# Patient Record
Sex: FEMALE | Race: WHITE | Employment: FULL TIME | URBAN - METROPOLITAN AREA
[De-identification: names, ages, dates, MRNs, and addresses within clinical notes are randomized per-mention and may not be internally consistent; named-entity substitution may affect disease eponyms.]

---

## 2017-03-02 ENCOUNTER — TELEPHONE (OUTPATIENT)
Dept: FAMILY MEDICINE | Facility: CLINIC | Age: 67
End: 2017-03-02

## 2017-03-02 ENCOUNTER — OFFICE VISIT (OUTPATIENT)
Dept: FAMILY MEDICINE | Facility: CLINIC | Age: 67
End: 2017-03-02
Payer: COMMERCIAL

## 2017-03-02 VITALS
SYSTOLIC BLOOD PRESSURE: 98 MMHG | WEIGHT: 139 LBS | HEART RATE: 75 BPM | OXYGEN SATURATION: 100 % | DIASTOLIC BLOOD PRESSURE: 53 MMHG | TEMPERATURE: 97.1 F | HEIGHT: 64 IN | BODY MASS INDEX: 23.73 KG/M2

## 2017-03-02 DIAGNOSIS — R09.81 SINUS CONGESTION: Primary | ICD-10-CM

## 2017-03-02 DIAGNOSIS — N60.19 FIBROCYSTIC BREAST DISEASE, UNSPECIFIED LATERALITY: ICD-10-CM

## 2017-03-02 PROCEDURE — 99213 OFFICE O/P EST LOW 20 MIN: CPT | Performed by: NURSE PRACTITIONER

## 2017-03-02 RX ORDER — PREDNISONE 20 MG/1
40 TABLET ORAL DAILY
Qty: 10 TABLET | Refills: 0 | Status: SHIPPED | OUTPATIENT
Start: 2017-03-02 | End: 2017-03-07

## 2017-03-02 RX ORDER — ESTRADIOL 0.04 MG/D
1 PATCH, EXTENDED RELEASE TRANSDERMAL
Qty: 24 PATCH | Refills: 3 | Status: SHIPPED | OUTPATIENT
Start: 2017-03-02 | End: 2017-11-21

## 2017-03-02 NOTE — PROGRESS NOTES
HPI      SUBJECTIVE:                                                    Renea Lerma is a 66 year old female who presents to clinic today for the following health issues:      RESPIRATORY SYMPTOMS      Duration: Off and on since January, got worse yesterday    Description  nasal congestion, facial pain/pressure, cough, chills, ear pain bilateral, headache, hoarse voice, nausea and stomach ache    Severity: moderate    Accompanying signs and symptoms: None    History (predisposing factors):  none    Precipitating or alleviating factors: None    Therapies tried and outcome:  none       Dec 31 had initial cold for about 10 days   Soon after got sick again   Has sinus pressure, ear pressure, hoarse voice  Blowing nose a lot and its occasionally bloody   Post nasal drainage causing cough   Hasn't taken anything recently OTC   Hasn't had luck with abx in the past, but does well with prednisone       Past Medical History   Diagnosis Date     Asthma      BCC (basal cell carcinoma of skin) 2010     rt shoulder     Endometrial ca (H)      stage 1     Family hx of colon cancer      GERD (gastroesophageal reflux disease)      Hormone replacement therapy      Hyperlipidemia LDL goal <130 11/4/2016     Hypothyroidism      Nonrheumatic aortic valve insufficiency 11/4/2016     PONV (postoperative nausea and vomiting)      Shoulder arthritis      rt     Thoracic aortic aneurysm without rupture (H)      4.3 cm     Vitamin D deficiency 11/4/2016     Past Surgical History   Procedure Laterality Date     C vag hyst,uterus >250 gms,rem tube/ovary  2000     Colonoscopy       Tonsillectomy       Colonoscopy N/A 12/12/2016     Procedure: COLONOSCOPY;  Surgeon: Manjinder Vera MD;  Location:  GI     Social History   Substance Use Topics     Smoking status: Never Smoker     Smokeless tobacco: Never Used     Alcohol use Yes      Comment: 1 glass wine per week     Current Outpatient Prescriptions   Medication Sig Dispense Refill      "estradiol (VIVELLE-DOT) 0.0375 MG/24HR Place 1 patch onto the skin twice a week 8 patch 3     levothyroxine (SYNTHROID, LEVOTHROID) 75 MCG tablet Take 1 tablet (75 mcg) by mouth daily 90 tablet 3     albuterol (PROAIR HFA, PROVENTIL HFA, VENTOLIN HFA) 108 (90 BASE) MCG/ACT inhaler Inhale 2 puffs into the lungs every 6 hours as needed for shortness of breath / dyspnea or wheezing 3 Inhaler 1     Allergies   Allergen Reactions     No Known Drug Allergies      Seasonal Allergies      Adhesive Tape Rash       Reviewed PMH, med list and allergies.      ROS  Detailed as above       BP 98/53 (BP Location: Right arm, Patient Position: Chair, Cuff Size: Adult Regular)  Pulse 75  Temp 97.1  F (36.2  C) (Oral)  Ht 5' 3.5\" (1.613 m)  Wt 139 lb (63 kg)  SpO2 100%  BMI 24.24 kg/m2      Physical Exam   Constitutional: She is well-developed, well-nourished, and in no distress.   HENT:   Head: Normocephalic.   Right Ear: Tympanic membrane, external ear and ear canal normal.   Left Ear: Tympanic membrane, external ear and ear canal normal.   Nose: Rhinorrhea present. No mucosal edema. Right sinus exhibits no maxillary sinus tenderness and no frontal sinus tenderness. Left sinus exhibits no maxillary sinus tenderness and no frontal sinus tenderness.   Mouth/Throat: Oropharynx is clear and moist. No oropharyngeal exudate.   Eyes: Conjunctivae are normal.   Neck: Normal range of motion.   Cardiovascular: Normal rate, regular rhythm and normal heart sounds.    No murmur heard.  Pulmonary/Chest: Effort normal and breath sounds normal. No respiratory distress.   Lymphadenopathy:     She has no cervical adenopathy.   Neurological: She is alert.   Skin: Skin is warm and dry.   Psychiatric: Mood and affect normal.   Vitals reviewed.      Assessment and Plan:       ICD-10-CM    1. Sinus congestion R09.81 predniSONE (DELTASONE) 20 MG tablet   2. Fibrocystic breast disease, unspecified laterality N60.19 estradiol (VIVELLE-DOT) 0.0375 MG/24HR BIW " patch       Only has had improvement in the past with prednisone so will trial this   Consider ENT f/u if symptoms persist or return again soon       DANIEL Kaba, CNP  [facility]

## 2017-03-02 NOTE — PATIENT INSTRUCTIONS
Use the ocean spray 4 times a day during this acute phase   Take the prednisone. You can stop it the day you feel better. You do not need to take the entire course if you are better.

## 2017-03-02 NOTE — TELEPHONE ENCOUNTER
Patient called and states was sick on New years zoe on flight back and was sick for a week then better and then sick and has been going on ever since then.  States yesterday has intensified with pain in her head, choking from drainage in her throat and nausea from     Acute Illness   Acute illness concerns: sinus pressure  Onset: started bad yesterday    Fever: no feels warm    Chills/Sweats: YES    Headache (location?): YES    Sinus Pressure:YES    Conjunctivitis:  no    Ear Pain: YES- both    Rhinorrhea: YES- yellow    Congestion: YES    Sore Throat: YES     Cough: YES-non-productive states feels from nasal drainage irritation    Wheeze: no     Decreased Appetite: no     Nausea: YES- states from drainage into stomach    Vomiting: no    Diarrhea:  no    Dysuria/Freq.: no    Fatigue/Achiness: no    Sick/Strep Exposure: no     Therapies Tried and outcome: States previously doing cough drops, mucinex, generic benadryl,  vicks    Advised patient to be seen. OV scheduled with Tom for evaluation and treatment.  Tuyet Nath RN  [facility] [employer]

## 2017-03-02 NOTE — NURSING NOTE
"Chief Complaint   Patient presents with     URI       Initial BP 98/53 (BP Location: Right arm, Patient Position: Chair, Cuff Size: Adult Regular)  Pulse 75  Temp 97.1  F (36.2  C) (Oral)  Ht 5' 3.5\" (1.613 m)  Wt 139 lb (63 kg)  SpO2 100%  BMI 24.24 kg/m2 Estimated body mass index is 24.24 kg/(m^2) as calculated from the following:    Height as of this encounter: 5' 3.5\" (1.613 m).    Weight as of this encounter: 139 lb (63 kg).  Medication Reconciliation: complete.    Susanna Montenegro CMA      "

## 2017-03-02 NOTE — MR AVS SNAPSHOT
After Visit Summary   3/2/2017    Renea Lerma    MRN: [id number]           Patient Information     Date Of Birth          1950        Visit Information        Provider Department      3/2/2017 11:30 AM Sasha Mejia APRN CNP [facility]        Today's Diagnoses     Sinus congestion    -  1    Fibrocystic breast disease, unspecified laterality          Care Instructions    Use the ocean spray 4 times a day during this acute phase   Take the prednisone. You can stop it the day you feel better. You do not need to take the entire course if you are better.         Follow-ups after your visit        Who to contact     If you have questions or need follow up information about today's clinic visit or your schedule please contact [facility] directly at [phone].  Normal or non-critical lab and imaging results will be communicated to you by MyChart, letter or phone within 4 business days after the clinic has received the results. If you do not hear from us within 7 days, please contact the clinic through MyChart or phone. If you have a critical or abnormal lab result, we will notify you by phone as soon as possible.  Submit refill requests through Storie or call your pharmacy and they will forward the refill request to us. Please allow 3 business days for your refill to be completed.          Additional Information About Your Visit        MyChart Information     Storie gives you secure access to your electronic health record. If you see a primary care provider, you can also send messages to your care team and make appointments. If you have questions, please call your primary care clinic.  If you do not have a primary care provider, please call [phone] and they will assist you.        Care EveryWhere ID     This is your Care EveryWhere ID. This could be used by other organizations to access your [facility] medical records  [id number]        Your Vitals Were      "Pulse Temperature Height Pulse Oximetry BMI (Body Mass Index)       75 97.1  F (36.2  C) (Oral) 5' 3.5\" (1.613 m) 100% 24.24 kg/m2        Blood Pressure from Last 3 Encounters:   03/02/17 98/53   12/12/16 114/60   11/04/16 102/57    Weight from Last 3 Encounters:   03/02/17 139 lb (63 kg)   12/12/16 135 lb (61.2 kg)   11/04/16 139 lb 14.4 oz (63.5 kg)              Today, you had the following     No orders found for display         Today's Medication Changes          These changes are accurate as of: 3/2/17  8:02 PM.  If you have any questions, ask your nurse or doctor.               Start taking these medicines.        Dose/Directions    predniSONE 20 MG tablet   Commonly known as:  DELTASONE   Used for:  Sinus congestion   Started by:  Sasha Mejia APRN CNP        Dose:  40 mg   Take 2 tablets (40 mg) by mouth daily for 5 days   Quantity:  10 tablet   Refills:  0            Where to get your medicines      These medications were sent to [facility]/pharmacy #[id number] - [place], [place] - [address]  [address] [address]     Phone:  [phone]     estradiol 0.0375 MG/24HR BIW patch    predniSONE 20 MG tablet                Primary Care Provider Office Phone # Fax #    Caprice Lares [phone]       [facility] [address] [facility] [address]  [facility] [address]        Thank you!     Thank you for choosing [facility]  for your care. Our goal is always to provide you with excellent care. Hearing back from our patients is one way we can continue to improve our services. Please take a few minutes to complete the written survey that you may receive in the mail after your visit with us. Thank you!             Your Updated Medication List - Protect others around you: Learn how to safely use, store and throw away your medicines at www.disposemymeds.org.          This list is accurate as of: 3/2/17  8:02 PM.  Always use your most recent med list.                   Brand Name " Dispense Instructions for use    albuterol 108 (90 BASE) MCG/ACT Inhaler    PROAIR HFA/PROVENTIL HFA/VENTOLIN HFA    3 Inhaler    Inhale 2 puffs into the lungs every 6 hours as needed for shortness of breath / dyspnea or wheezing       estradiol 0.0375 MG/24HR BIW patch    VIVELLE-DOT    24 patch    Place 1 patch onto the skin twice a week       levothyroxine 75 MCG tablet    SYNTHROID/LEVOTHROID    90 tablet    Take 1 tablet (75 mcg) by mouth daily       predniSONE 20 MG tablet    DELTASONE    10 tablet    Take 2 tablets (40 mg) by mouth daily for 5 days

## 2017-03-03 ASSESSMENT — ASTHMA QUESTIONNAIRES: ACT_TOTALSCORE: 24

## 2017-03-10 NOTE — TELEPHONE ENCOUNTER
ORder placed. [facility] [place] [phone]   [email or web address]

## 2017-03-10 NOTE — TELEPHONE ENCOUNTER
Patient last seen 3/2/17 Sasha Mejia  Looks like if symptoms were not improving; consider ENT.     Dr. Mejia:  Patient stating symptoms have not improved. Would you like to refer to ENT?    Jamaica Mosley RN      Assessment and Plan 3/2/17:          ICD-10-CM     1. Sinus congestion R09.81 predniSONE (DELTASONE) 20 MG tablet   2. Fibrocystic breast disease, unspecified laterality N60.19 estradiol (VIVELLE-DOT) 0.0375 MG/24HR BIW patch         Only has had improvement in the past with prednisone so will trial this   Consider ENT f/u if symptoms persist or return again soon         Sasha Mejia, APRN, CNP  [facility]

## 2017-03-10 NOTE — TELEPHONE ENCOUNTER
Reason for call:  Patient reporting a symptom    Symptom or request: sinus    Duration (how long have symptoms been present): since january    Have you been treated for this before? Yes    Additional comments: PT was seen on 3/2 and advised to call if symptoms continue    Phone Number patient can be reached at:  Home number on file [phone] (home)    Best Time:      Can we leave a detailed message on this number:  YES    Call taken on 3/10/2017 at 3:31 PM by Reynaldo Rabago

## 2017-03-12 NOTE — TELEPHONE ENCOUNTER
Honestly any of the physicians at \[facility]\"" [facility] are excellent. You can tell her I have worked with all of them, so she will get excellent care from any of them. Thanks

## 2017-03-13 NOTE — TELEPHONE ENCOUNTER
I called patient and spoke with her.   Relayed Dr. Mejia's advice.   Patient will call to schedule with first available then.    Jamaica Mosley RN

## 2017-06-14 ENCOUNTER — RADIANT APPOINTMENT (OUTPATIENT)
Dept: GENERAL RADIOLOGY | Facility: CLINIC | Age: 67
End: 2017-06-14
Attending: PHYSICIAN ASSISTANT
Payer: COMMERCIAL

## 2017-06-14 ENCOUNTER — OFFICE VISIT (OUTPATIENT)
Dept: FAMILY MEDICINE | Facility: CLINIC | Age: 67
End: 2017-06-14
Payer: COMMERCIAL

## 2017-06-14 VITALS
HEART RATE: 76 BPM | SYSTOLIC BLOOD PRESSURE: 102 MMHG | HEIGHT: 64 IN | DIASTOLIC BLOOD PRESSURE: 59 MMHG | TEMPERATURE: 98.2 F | OXYGEN SATURATION: 98 % | BODY MASS INDEX: 24.55 KG/M2 | WEIGHT: 143.8 LBS

## 2017-06-14 DIAGNOSIS — I71.20 THORACIC AORTIC ANEURYSM WITHOUT RUPTURE (H): ICD-10-CM

## 2017-06-14 DIAGNOSIS — R06.09 DOE (DYSPNEA ON EXERTION): ICD-10-CM

## 2017-06-14 DIAGNOSIS — I35.1 NONRHEUMATIC AORTIC VALVE INSUFFICIENCY: ICD-10-CM

## 2017-06-14 DIAGNOSIS — E03.9 HYPOTHYROIDISM, UNSPECIFIED TYPE: ICD-10-CM

## 2017-06-14 DIAGNOSIS — R06.09 DOE (DYSPNEA ON EXERTION): Primary | ICD-10-CM

## 2017-06-14 DIAGNOSIS — R07.89 CHEST TIGHTNESS: ICD-10-CM

## 2017-06-14 DIAGNOSIS — J45.20 MILD INTERMITTENT ASTHMA WITHOUT COMPLICATION: ICD-10-CM

## 2017-06-14 LAB
ERYTHROCYTE [DISTWIDTH] IN BLOOD BY AUTOMATED COUNT: 13.4 % (ref 10–15)
HCT VFR BLD AUTO: 37.4 % (ref 35–47)
HGB BLD-MCNC: 12.7 G/DL (ref 11.7–15.7)
MCH RBC QN AUTO: 30.5 PG (ref 26.5–33)
MCHC RBC AUTO-ENTMCNC: 34 G/DL (ref 31.5–36.5)
MCV RBC AUTO: 90 FL (ref 78–100)
PLATELET # BLD AUTO: 263 10E9/L (ref 150–450)
RBC # BLD AUTO: 4.16 10E12/L (ref 3.8–5.2)
TROPONIN I SERPL-MCNC: NORMAL UG/L (ref 0–0.04)
WBC # BLD AUTO: 6.8 10E9/L (ref 4–11)

## 2017-06-14 PROCEDURE — 85027 COMPLETE CBC AUTOMATED: CPT | Performed by: PHYSICIAN ASSISTANT

## 2017-06-14 PROCEDURE — 84443 ASSAY THYROID STIM HORMONE: CPT | Performed by: PHYSICIAN ASSISTANT

## 2017-06-14 PROCEDURE — 99214 OFFICE O/P EST MOD 30 MIN: CPT | Performed by: PHYSICIAN ASSISTANT

## 2017-06-14 PROCEDURE — 71020 XR CHEST 2 VW: CPT

## 2017-06-14 PROCEDURE — 36415 COLL VENOUS BLD VENIPUNCTURE: CPT | Performed by: PHYSICIAN ASSISTANT

## 2017-06-14 PROCEDURE — 93000 ELECTROCARDIOGRAM COMPLETE: CPT | Performed by: PHYSICIAN ASSISTANT

## 2017-06-14 PROCEDURE — 84484 ASSAY OF TROPONIN QUANT: CPT | Performed by: PHYSICIAN ASSISTANT

## 2017-06-14 NOTE — MR AVS SNAPSHOT
After Visit Summary   6/14/2017    Renea Lerma    MRN: [id number]           Patient Information     Date Of Birth          1950        Visit Information        Provider Department      6/14/2017 2:00 PM Christiane Salazar PA-C [facility]        Today's Diagnoses     DALEY (dyspnea on exertion)    -  1    Chest tightness        Thoracic aortic aneurysm without rupture (H)        Mild intermittent asthma without complication        Nonrheumatic aortic valve insufficiency        Hypothyroidism, unspecified type           Follow-ups after your visit        Additional Services     CARDIO  ADULT REFERRAL       [facility] is referring you to Cardiology Services.       The  Representative will assist you in the coordination of your Cardiology care as prescribed by your physician.    The  Representative will call you within 24 hours to help schedule your appointment, or you may contact the  Representative at: [phone].         Type of Referral: New Cardiology Referral            Timeframe requested: 1-2 days       Coverage of these services is subject to the terms and limitations of your health insurance plan.  Please call member services at your health plan with any benefit or coverage questions.      If X-rays, CT or MRI's have been performed, please contact the facility where they were done to arrange for , prior to your scheduled appointment.  Please bring this referral request to your appointment and present it to your specialist.                  Your next 10 appointments already scheduled     Jun 16, 2017  7:30 AM CDT   Ech Complete with [id number]   [facility] CV Echocardiography (Cardiovascular Imaging at [facility])    [address] [place] [id number] [phone]           1.  Please bring or wear a comfortable two-piece outfit. 2.  You may eat, drink and take your normal  "medicines. 3.  For any questions that cannot be answered, please contact the ordering physician              Future tests that were ordered for you today     Open Future Orders        Priority Expected Expires Ordered    Echocardiogram Complete Routine  6/14/2018 6/14/2017            Who to contact     If you have questions or need follow up information about today's clinic visit or your schedule please contact [facility] directly at [phone].  Normal or non-critical lab and imaging results will be communicated to you by G-modehart, letter or phone within 4 business days after the clinic has received the results. If you do not hear from us within 7 days, please contact the clinic through Cerecort or phone. If you have a critical or abnormal lab result, we will notify you by phone as soon as possible.  Submit refill requests through Klevosti or call your pharmacy and they will forward the refill request to us. Please allow 3 business days for your refill to be completed.          Additional Information About Your Visit        G-modeharVasoNova Information     Klevosti gives you secure access to your electronic health record. If you see a primary care provider, you can also send messages to your care team and make appointments. If you have questions, please call your primary care clinic.  If you do not have a primary care provider, please call [phone] and they will assist you.        Care EveryWhere ID     This is your Care EveryWhere ID. This could be used by other organizations to access your [place] medical records  [id number]        Your Vitals Were     Pulse Temperature Height Pulse Oximetry BMI (Body Mass Index)       76 98.2  F (36.8  C) (Oral) 5' 3.5\" (1.613 m) 98% 25.07 kg/m2        Blood Pressure from Last 3 Encounters:   06/14/17 102/59   03/02/17 98/53   12/12/16 114/60    Weight from Last 3 Encounters:   06/14/17 143 lb 12.8 oz (65.2 kg)   03/02/17 139 lb (63 kg)   12/12/16 135 lb (61.2 kg)    "           We Performed the Following     CARDIO  ADULT REFERRAL     CBC with platelets     EKG 12-lead complete w/read - Clinics     Troponin I     TSH with free T4 reflex        Primary Care Provider Office Phone # Fax #    Caprice Lares [phone]       [facility] [address]  [place] [address]        Thank you!     Thank you for choosing [facility]  for your care. Our goal is always to provide you with excellent care. Hearing back from our patients is one way we can continue to improve our services. Please take a few minutes to complete the written survey that you may receive in the mail after your visit with us. Thank you!             Your Updated Medication List - Protect others around you: Learn how to safely use, store and throw away your medicines at www.disposemymeds.org.          This list is accurate as of: 6/14/17  4:36 PM.  Always use your most recent med list.                   Brand Name Dispense Instructions for use    albuterol 108 (90 BASE) MCG/ACT Inhaler    PROAIR HFA/PROVENTIL HFA/VENTOLIN HFA    3 Inhaler    Inhale 2 puffs into the lungs every 6 hours as needed for shortness of breath / dyspnea or wheezing       estradiol 0.0375 MG/24HR BIW patch    VIVELLE-DOT    24 patch    Place 1 patch onto the skin twice a week       levothyroxine 75 MCG tablet    SYNTHROID/LEVOTHROID    90 tablet    Take 1 tablet (75 mcg) by mouth daily

## 2017-06-14 NOTE — NURSING NOTE
"Chief Complaint   Patient presents with     Eye Problem     blurred vision       Initial /59 (BP Location: Left arm, Patient Position: Chair, Cuff Size: Adult Regular)  Pulse 76  Temp 98.2  F (36.8  C) (Oral)  Ht 5' 3.5\" (1.613 m)  Wt 143 lb 12.8 oz (65.2 kg)  SpO2 98%  BMI 25.07 kg/m2 Estimated body mass index is 25.07 kg/(m^2) as calculated from the following:    Height as of this encounter: 5' 3.5\" (1.613 m).    Weight as of this encounter: 143 lb 12.8 oz (65.2 kg).  Medication Reconciliation: complete   Hope Villalobos MA  "

## 2017-06-14 NOTE — PROGRESS NOTES
"HPI: 68 yo female with PMH of thoracic aneurysm and \"mild MR and AR\" here with complaint of pain in feet with swelling starting over the w/e  This is not typical for her  Denies hx of problems with edema. Wt is up 2lbs compared to recent gyn visit.  Today when driving to work feeling lightheaded and vision feels off so coworker brought her in for this urgent visit.  She feels like both eyes are cloudy and this makes reading difficult which is new  She had her coworker bring her here today  She has nausea today as well  She was able to eat a bagel but still feels nauseated  She has some loose stools x 2d  Yesterday had a small BM every time she urinated  Today had about 4 loose stools today  Denies melana or hematochezia  She has never had swelling in her feet  She feels a tightness in her chest well and feels more sob with exertion lately when biking    Past Medical History:   Diagnosis Date     Asthma      BCC (basal cell carcinoma of skin) 2010    rt shoulder     Endometrial ca (H)     stage 1     Family hx of colon cancer      GERD (gastroesophageal reflux disease)      Hormone replacement therapy      Hyperlipidemia LDL goal <130 11/4/2016     Hypothyroidism      Nonrheumatic aortic valve insufficiency 11/4/2016     PONV (postoperative nausea and vomiting)      Shoulder arthritis     rt     Thoracic aortic aneurysm without rupture (H)     4.3 cm     Vitamin D deficiency 11/4/2016     Past Surgical History:   Procedure Laterality Date     C VAG HYST,UTERUS >250 GMS,REM TUBE/OVARY  2000     COLONOSCOPY       COLONOSCOPY N/A 12/12/2016    Procedure: COLONOSCOPY;  Surgeon: Manjinder Vera MD;  Location:  GI     TONSILLECTOMY       Social History   Substance Use Topics     Smoking status: Never Smoker     Smokeless tobacco: Never Used     Alcohol use Yes      Comment: 1 glass wine per week     Current Outpatient Prescriptions   Medication Sig Dispense Refill     estradiol (VIVELLE-DOT) 0.0375 MG/24HR BIW patch " "Place 1 patch onto the skin twice a week 24 patch 3     levothyroxine (SYNTHROID, LEVOTHROID) 75 MCG tablet Take 1 tablet (75 mcg) by mouth daily 90 tablet 3     albuterol (PROAIR HFA, PROVENTIL HFA, VENTOLIN HFA) 108 (90 BASE) MCG/ACT inhaler Inhale 2 puffs into the lungs every 6 hours as needed for shortness of breath / dyspnea or wheezing 3 Inhaler 1     Allergies   Allergen Reactions     No Known Drug Allergies      Seasonal Allergies      Adhesive Tape Rash     FAMILY HISTORY NOTED AND REVIEWED  PHYSICAL EXAM:    /59 (BP Location: Left arm, Patient Position: Chair, Cuff Size: Adult Regular)  Pulse 76  Temp 98.2  F (36.8  C) (Oral)  Ht 5' 3.5\" (1.613 m)  Wt 143 lb 12.8 oz (65.2 kg)  SpO2 98%  BMI 25.07 kg/m2    Patient appears non toxic  Lungs: CTA bilat without crackles or wheezing  Heart: RRR without skips  Extr; trace nonpitting pedal edema bilat  Psych: approp affect and mood.    Results for orders placed or performed in visit on 06/14/17   Troponin I   Result Value Ref Range    Troponin I ES  0.000 - 0.045 ug/L     <0.015  The 99th percentile for upper reference range is 0.045 ug/L.  Troponin values in   the range of 0.045 - 0.120 ug/L may be associated with risks of adverse   clinical events.     CBC with platelets   Result Value Ref Range    WBC 6.8 4.0 - 11.0 10e9/L    RBC Count 4.16 3.8 - 5.2 10e12/L    Hemoglobin 12.7 11.7 - 15.7 g/dL    Hematocrit 37.4 35.0 - 47.0 %    MCV 90 78 - 100 fl    MCH 30.5 26.5 - 33.0 pg    MCHC 34.0 31.5 - 36.5 g/dL    RDW 13.4 10.0 - 15.0 %    Platelet Count 263 150 - 450 10e9/L         Assessment and Plan:     (R06.09) DALEY (dyspnea on exertion)  (primary encounter diagnosis)  Comment: echocardiogram ordered. Cardiac ref placed  Plan: EKG 12-lead complete w/read - Clinics, XR Chest        2 Views, CBC with platelets            (R07.89) Chest tightness  Comment:   Plan: Troponin I is normal, echo ordered and cardiac ref sent        Pt advised to go to ED if sxs " worsen or persist    (I71.2) Thoracic aortic aneurysm without rupture (H)  Comment:   Plan: echo as above.    (J45.20) Mild intermittent asthma without complication  Comment:   Plan: stable, no changes    (I35.1) Nonrheumatic aortic valve insufficiency  Comment:   Plan: cards/echo     (E03.9) Hypothyroidism, unspecified type  Comment:   Plan: TSH with free T4 reflex              Christiane Salazar PA-C

## 2017-06-15 LAB — TSH SERPL DL<=0.005 MIU/L-ACNC: 0.8 MU/L (ref 0.4–4)

## 2017-06-16 ENCOUNTER — HOSPITAL ENCOUNTER (OUTPATIENT)
Dept: CARDIOLOGY | Facility: CLINIC | Age: 67
Discharge: HOME | End: 2017-06-16
Attending: PHYSICIAN ASSISTANT | Admitting: PHYSICIAN ASSISTANT
Payer: COMMERCIAL

## 2017-06-16 DIAGNOSIS — R06.09 DOE (DYSPNEA ON EXERTION): ICD-10-CM

## 2017-06-16 DIAGNOSIS — R07.89 CHEST TIGHTNESS: ICD-10-CM

## 2017-06-16 DIAGNOSIS — I35.1 NONRHEUMATIC AORTIC VALVE INSUFFICIENCY: ICD-10-CM

## 2017-06-16 PROCEDURE — 93306 TTE W/DOPPLER COMPLETE: CPT

## 2017-06-16 PROCEDURE — 93306 TTE W/DOPPLER COMPLETE: CPT | Mod: 26 | Performed by: INTERNAL MEDICINE

## 2017-07-05 ENCOUNTER — PRE VISIT (OUTPATIENT)
Dept: CARDIOLOGY | Facility: CLINIC | Age: 67
End: 2017-07-05

## 2017-07-05 DIAGNOSIS — R91.1 LUNG NODULE: ICD-10-CM

## 2017-07-21 ENCOUNTER — OFFICE VISIT (OUTPATIENT)
Dept: CARDIOLOGY | Facility: CLINIC | Age: 67
End: 2017-07-21
Attending: PHYSICIAN ASSISTANT
Payer: COMMERCIAL

## 2017-07-21 VITALS
WEIGHT: 142.9 LBS | SYSTOLIC BLOOD PRESSURE: 112 MMHG | DIASTOLIC BLOOD PRESSURE: 65 MMHG | BODY MASS INDEX: 24.4 KG/M2 | HEIGHT: 64 IN | HEART RATE: 70 BPM

## 2017-07-21 DIAGNOSIS — E78.2 MIXED HYPERLIPIDEMIA: ICD-10-CM

## 2017-07-21 DIAGNOSIS — I71.20 THORACIC AORTIC ANEURYSM WITHOUT RUPTURE (H): ICD-10-CM

## 2017-07-21 DIAGNOSIS — I35.1 NONRHEUMATIC AORTIC VALVE INSUFFICIENCY: Primary | ICD-10-CM

## 2017-07-21 DIAGNOSIS — R06.09 DOE (DYSPNEA ON EXERTION): ICD-10-CM

## 2017-07-21 PROCEDURE — 99204 OFFICE O/P NEW MOD 45 MIN: CPT | Performed by: INTERNAL MEDICINE

## 2017-07-21 RX ORDER — FLUTICASONE PROPIONATE 50 MCG
1 SPRAY, SUSPENSION (ML) NASAL DAILY PRN
COMMUNITY

## 2017-07-21 RX ORDER — MULTIPLE VITAMINS W/ MINERALS TAB 9MG-400MCG
1 TAB ORAL DAILY
COMMUNITY
End: 2017-11-06

## 2017-07-21 NOTE — MR AVS SNAPSHOT
After Visit Summary   7/21/2017    Renea Lerma    MRN: [id number]           Patient Information     Date Of Birth          1950        Visit Information        Provider Department      7/21/2017 4:15 PM Michael Bustamante MD [facility]        Today's Diagnoses     Nonrheumatic aortic valve insufficiency    -  1    Thoracic aortic aneurysm without rupture (H)        Mixed hyperlipidemia        DALEY (dyspnea on exertion)           Follow-ups after your visit        Additional Services     Follow-Up with Cardiologist                 Future tests that were ordered for you today     Open Future Orders        Priority Expected Expires Ordered    Basic metabolic panel Routine 1/17/2018 7/21/2018 7/21/2017    Lipid Profile Routine 1/17/2018 7/21/2018 7/21/2017    ALT Routine 1/17/2018 7/21/2018 7/21/2017    Follow-Up with Cardiologist Routine 1/17/2018 7/21/2018 7/21/2017    MRI Angiogram chest w & w/o contrast Routine 8/22/2017 7/21/2018 7/21/2017    Exercise Stress Echocardiogram Routine 8/20/2017 7/21/2018 7/21/2017            Who to contact     If you have questions or need follow up information about today's clinic visit or your schedule please contact [facility] directly at [phone].  Normal or non-critical lab and imaging results will be communicated to you by MyChart, letter or phone within 4 business days after the clinic has received the results. If you do not hear from us within 7 days, please contact the clinic through MyChart or phone. If you have a critical or abnormal lab result, we will notify you by phone as soon as possible.  Submit refill requests through [employer] or call your pharmacy and they will forward the refill request to us. Please allow 3 business days for your refill to be completed.          Additional Information About Your Visit        MyChart Information     [employer] gives you  "secure access to your electronic health record. If you see a primary care provider, you can also send messages to your care team and make appointments. If you have questions, please call your primary care clinic.  If you do not have a primary care provider, please call [phone] and they will assist you.        Care EveryWhere ID     This is your Care EveryWhere ID. This could be used by other organizations to access your [place] medical records  [id number]        Your Vitals Were     Pulse Height BMI (Body Mass Index)             70 1.613 m (5' 3.5\") 24.92 kg/m2          Blood Pressure from Last 3 Encounters:   07/21/17 112/65   06/14/17 102/59   03/02/17 98/53    Weight from Last 3 Encounters:   07/21/17 64.8 kg (142 lb 14.4 oz)   06/14/17 65.2 kg (143 lb 12.8 oz)   03/02/17 63 kg (139 lb)               Primary Care Provider Office Phone # Fax #    Bhmariachao Lares [phone]       [facility] [place] [address]  [place] [address]        Equal Access to Services     KELSIE ATKINSON AH: Hadii aad ku hadasho Soomaali, waaxda luqadaha, qaybta kaalmada adeegyada, dexter agrawalin hayyoeln valentino smith ah. So [facility] [phone].    ATENCIÓN: Si habla español, tiene a arcos disposición servicios gratuitos de asistencia lingüística. Llame al [phone].    We comply with applicable federal civil rights laws and [place] laws. We do not discriminate on the basis of race, color, national origin, age, disability sex, sexual orientation or gender identity.            Thank you!     Thank you for choosing [facility] [place]  for your care. Our goal is always to provide you with excellent care. Hearing back from our patients is one way we can continue to improve our services. Please take a few minutes to complete the written survey that you may receive in the mail after your visit with us. Thank you!             Your Updated Medication List - Protect others " around you: Learn how to safely use, store and throw away your medicines at www.disposemymeds.org.          This list is accurate as of: 7/21/17  5:36 PM.  Always use your most recent med list.                   Brand Name Dispense Instructions for use Diagnosis    albuterol 108 (90 BASE) MCG/ACT Inhaler    PROAIR HFA/PROVENTIL HFA/VENTOLIN HFA    3 Inhaler    Inhale 2 puffs into the lungs every 6 hours as needed for shortness of breath / dyspnea or wheezing    Mild intermittent asthma without complication       cholecalciferol 1000 UNIT tablet    vitamin D     Take 2,500 Units by mouth daily        estradiol 0.0375 MG/24HR BIW patch    VIVELLE-DOT    24 patch    Place 1 patch onto the skin twice a week    Fibrocystic breast disease, unspecified laterality       fluticasone 50 MCG/ACT spray    FLONASE     Spray 1 spray into both nostrils daily        levothyroxine 75 MCG tablet    SYNTHROID/LEVOTHROID    90 tablet    Take 1 tablet (75 mcg) by mouth daily    Acquired hypothyroidism       Multi-vitamin Tabs tablet      Take 1 tablet by mouth daily        VITAMIN B 12 PO      Take 250 mcg by mouth every 7 days

## 2017-07-21 NOTE — PROGRESS NOTES
REFERRING PROVIDER:  Christiane Salazar PA-C  [facility]  [address]  [place] [address]    PRIMARY CARE PROVIDER:  Caprice Lares  [facility] [address]  [place] [address]    REASON FOR VISIT/CHIEF COMPLAINT:  1.  Dilated thoracic aorta   2.  Dyspnea on exertion  3.  Hypercholesterolemia  4.  Positive family history for coronary artery disease  5.  Aortic insufficiency    HISTORY OF PRESENT ILLNESS:  the patient is a 67-year-old white female presents to cardiology clinic for evaluation of the dilated thoracic aorta and symptoms of dyslexia exertion.  Her history for coronary artery disease risk factors is negative for cigarette smoking, hypertension, diabetes mellitus; she has a positive history for hypercholesterolemia and family history of coronary disease at an early age.  She is postmenopausal and drinks one caffeinated beverages per day she is evaluated previously for chest discomfort as well as left-sided numbness with no evidence of stroke but with evidence of a dilated thoracic aorta by echocardiography and CT scan measuring 4.3 to 4.5 cm at maximum diameter she denies back pain.  She also relates dyslexia exertion with bicycling up hills but no flat surface.  She has no history of myocardial infarction, congestive heart failure, rheumatic heart disease, stroke.  She denies wei chest pain.  She is quite concerned about the evaluation follow-up of her dilated thoracic aorta and her recent symptoms of disrepair on exertion.  She does not relate shortness of breath at rest.  She denies fever, chills, sweats.    REVIEW OF SYSTEMS:  A comprehensive 10-point review of systems was completed and the pertinent positives are documented in the history of present illness.    Skin:  Negative     Eyes:  Positive for glasses (dry eyes )  ENT:  Positive for postnasal drainage  Respiratory:  Positive for dyspnea on exertion, asthma  Cardiovascular:    Positive for;chest  pain (chest tightness)  Gastroenterology: Negative    Genitourinary:  Negative    Musculoskeletal:  Negative    Neurologic:  Positive for headaches;numbness or tingling of hands;numbness or tingling of feet (pt has TMJ)  Psychiatric:  Negative for    Heme/Lymph/Imm:  Negative    Endocrine:  Negative      CURRENT MEDICATIONS:  Current Outpatient Prescriptions   Medication Sig Dispense Refill     fluticasone (FLONASE) 50 MCG/ACT spray Spray 1 spray into both nostrils daily       multivitamin, therapeutic with minerals (MULTI-VITAMIN) TABS tablet Take 1 tablet by mouth daily       cholecalciferol (VITAMIN D3) 1000 UNIT tablet Take 2,500 Units by mouth daily       Cyanocobalamin (VITAMIN B 12 PO) Take 250 mcg by mouth every 7 days       levothyroxine (SYNTHROID, LEVOTHROID) 75 MCG tablet Take 1 tablet (75 mcg) by mouth daily 90 tablet 3     albuterol (PROAIR HFA, PROVENTIL HFA, VENTOLIN HFA) 108 (90 BASE) MCG/ACT inhaler Inhale 2 puffs into the lungs every 6 hours as needed for shortness of breath / dyspnea or wheezing 3 Inhaler 1     estradiol (VIVELLE-DOT) 0.0375 MG/24HR BIW patch Place 1 patch onto the skin twice a week 24 patch 3       MEDICATIONS STARTED ON CURRENT VISIT:  Orders Placed This Encounter   Medications     fluticasone (FLONASE) 50 MCG/ACT spray     Sig: Spray 1 spray into both nostrils daily     multivitamin, therapeutic with minerals (MULTI-VITAMIN) TABS tablet     Sig: Take 1 tablet by mouth daily     cholecalciferol (VITAMIN D3) 1000 UNIT tablet     Sig: Take 2,500 Units by mouth daily     Cyanocobalamin (VITAMIN B 12 PO)     Sig: Take 250 mcg by mouth every 7 days       MEDICATIONS DISCONTINUED ON CURRENT VISIT:  There are no discontinued medications.    ALLERGIES:  Allergies   Allergen Reactions     No Known Drug Allergies      Seasonal Allergies      Adhesive Tape Rash       PAST MEDICAL HISTORY:    Past Medical History:   Diagnosis Date     Asthma      BCC (basal cell carcinoma of skin) 2010     "rt shoulder     Endometrial ca (H)     stage 1     Family hx of colon cancer      GERD (gastroesophageal reflux disease)      Hormone replacement therapy      Hyperlipidemia LDL goal <130 11/4/2016     Hypothyroidism      Nonrheumatic aortic valve insufficiency 11/4/2016     PONV (postoperative nausea and vomiting)      Shoulder arthritis     rt     Thoracic aortic aneurysm without rupture (H)     4.3 cm     Vitamin D deficiency 11/4/2016       PAST SURGICAL HISTORY:    Past Surgical History:   Procedure Laterality Date     C VAG HYST,UTERUS >250 GMS,REM TUBE/OVARY  2000     COLONOSCOPY       COLONOSCOPY N/A 12/12/2016    Procedure: COLONOSCOPY;  Surgeon: Manjinder Vera MD;  Location:  GI     TONSILLECTOMY         FAMILY HISTORY:    Family History   Problem Relation Age of Onset     DIABETES Father        SOCIAL HISTORY:    Social History     Social History     Marital status: Single     Spouse name: N/A     Number of children: N/A     Years of education: N/A     Social History Main Topics     Smoking status: Never Smoker     Smokeless tobacco: Never Used     Alcohol use Yes      Comment: 1 glass wine per week     Drug use: No     Sexual activity: Not Currently     Partners: Male     Other Topics Concern     None     Social History Narrative    Single, 2 children           PHYSICAL EXAM:    Vitals: /65  Pulse 70  Ht 1.613 m (5' 3.5\")  Wt 64.8 kg (142 lb 14.4 oz)  BMI 24.92 kg/m2    Constitutional: Comfortable at rest. Cooperative, alert and oriented, well developed, well nourished.  Eyes: Pupils equal and round, conjunctivae and lids unremarkable, sclera white, no xanthalasma,   ENT: Satisfactory dentition.  No cyanosis or pallor.  Neck: Carotid pulses are full and equal bilaterally, no carotid bruit, no thyromegaly     Respiratory: Normal respiratory effort with symmetrical chest wall movements and no use of accessory muscles. Good air entry with normal breath sounds and no rales or " wheeze.  Cardiovascular: Normal jugular venous pulse and pressure.  Normal carotid pulse character and volume.  No carotid bruit.  Regular rhythm.  Normal S1 and S2.  1/6 systolic and diastolic murmur at left sternal border radiating to carotids and apex.  No S3, S4, rub..    GI: Soft, nontender, no hepatosplenomegaly, no masses, active bowel sounds.  Lymph/Hematologic: Not examined.  Skin: No rash, erythema, ecchymosis.  Musculoskeletal: Normal muscle tone and strength. Normal gait. No spinal deformities.  Neuropsychiatric: Oriented to time place and person.  Affect normal.  No gross motor deficits.  Extremities: No edema. No clubbing or deformities.    DIAGNOSTIC DATA:  I reviewed pertinent laboratory data and imaging studies. Results were discussed with the patient.    LABORATORY DATA  -       LIPID RESULTS:  Lab Results   Component Value Date    CHOL 238 (H) 11/04/2016    HDL 80 11/04/2016     (H) 11/04/2016    TRIG 95 11/04/2016    CHOLHDLRATIO 3.15 04/17/2012    CHOLHDLRATIO 2.6 11/13/2007       LIVER ENZYME RESULTS:  Lab Results   Component Value Date    AST 17 11/04/2016    ALT 21 11/04/2016       CBC RESULTS:  Lab Results   Component Value Date    WBC 6.8 06/14/2017    RBC 4.16 06/14/2017    HGB 12.7 06/14/2017    HCT 37.4 06/14/2017    MCV 90 06/14/2017    MCH 30.5 06/14/2017    MCHC 34.0 06/14/2017    RDW 13.4 06/14/2017     06/14/2017       BMP RESULTS:  Lab Results   Component Value Date     11/04/2016    POTASSIUM 4.4 11/04/2016    CHLORIDE 107 11/04/2016    CO2 28 11/04/2016    ANIONGAP 6 11/04/2016    GLC 88 11/04/2016    BUN 16 11/04/2016    CR 0.69 11/04/2016    GFRESTIMATED 85 11/04/2016    GFRESTBLACK >90   GFR Calc   11/04/2016    HAWK 9.0 11/04/2016        A1C RESULTS:  No results found for: A1C    INR RESULTS:  No results found for: INR    ECG: sinus rhythm with mild left atrial abnormality     CHEST X-RAY: not applicable     TRANSTHORACIC ECHOCARDIOGRAM:  Images reviewed with the patient.        DIAGNOSES:  1 dilated thoracic aorta.   2.  Dyspnea on exertion  3.  Hypercholesterolemia positive family history for coronary disease  4.  Mild asthma      ASSESSMENT:  the patient presents with multiple concerns regarding her dilated thoracic aorta.  It appears to been between 4.0 and 4.5 cm over the past 10 years measured by echocardiography and CT scan.  MRA of the tests performed to evaluate without use of radiation.  Stress echocardiography is reasonable to evaluate symptoms of dyslexia on exertion to rule out possible coronary artery disease she also need close follow-up of her serum lipids.    PLAN:  1.  Stress echocardiogram to evaluate ischemic status of myocardium   2.  Chest MRA to evaluate thoracic aorta  3.  Consider Statin therapy for elevated lipids  4.  Routine medical follow-up  5.  Cardiology follow-up in six months or sooner depending upon results of test         Encounter Diagnoses   Name Primary?     Nonrheumatic aortic valve insufficiency Yes     Thoracic aortic aneurysm without rupture (H)      Mixed hyperlipidemia      DALEY (dyspnea on exertion)        Orders Placed This Encounter   Procedures     MRI Angiogram chest w & w/o contrast     Basic metabolic panel     Lipid Profile     ALT     Follow-Up with Cardiologist     Exercise Stress Echocardiogram       CC  REFERRING PROVIDER:  Christiane Salazar PA-C  [facility]  [address]  [place] [address]

## 2017-07-21 NOTE — LETTER
7/21/2017    Christiane Salazar PA-C  [facility]  [address]  [place] [address]    RE: Renea Lerma       Dear Colleague,    I had the pleasure of seeing Renea Lerma in the [facility].        REFERRING PROVIDER:  Christiane Salazar PA-C  [facility]  [address]  [place] [address]    PRIMARY CARE PROVIDER:  Caprice Lares  [facility] [address]  [place] [address]    REASON FOR VISIT/CHIEF COMPLAINT:  1.  Dilated thoracic aorta   2.  Dyspnea on exertion  3.  Hypercholesterolemia  4.  Positive family history for coronary artery disease  5.  Aortic insufficiency    HISTORY OF PRESENT ILLNESS:  the patient is a 67-year-old white female presents to cardiology clinic for evaluation of the dilated thoracic aorta and symptoms of dyslexia exertion.  Her history for coronary artery disease risk factors is negative for cigarette smoking, hypertension, diabetes mellitus; she has a positive history for hypercholesterolemia and family history of coronary disease at an early age.  She is postmenopausal and drinks one caffeinated beverages per day she is evaluated previously for chest discomfort as well as left-sided numbness with no evidence of stroke but with evidence of a dilated thoracic aorta by echocardiography and CT scan measuring 4.3 to 4.5 cm at maximum diameter she denies back pain.  She also relates dyslexia exertion with bicycling up hills but no flat surface.  She has no history of myocardial infarction, congestive heart failure, rheumatic heart disease, stroke.  She denies wei chest pain.  She is quite concerned about the evaluation follow-up of her dilated thoracic aorta and her recent symptoms of disrepair on exertion.  She does not relate shortness of breath at rest.  She denies fever, chills, sweats.    REVIEW OF SYSTEMS:  A comprehensive 10-point review of systems was completed and the pertinent  positives are documented in the history of present illness.    Skin:  Negative     Eyes:  Positive for glasses (dry eyes )  ENT:  Positive for postnasal drainage  Respiratory:  Positive for dyspnea on exertion, asthma  Cardiovascular:    Positive for;chest pain (chest tightness)  Gastroenterology: Negative    Genitourinary:  Negative    Musculoskeletal:  Negative    Neurologic:  Positive for headaches;numbness or tingling of hands;numbness or tingling of feet (pt has TMJ)  Psychiatric:  Negative for    Heme/Lymph/Imm:  Negative    Endocrine:  Negative      CURRENT MEDICATIONS:  Current Outpatient Prescriptions   Medication Sig Dispense Refill     fluticasone (FLONASE) 50 MCG/ACT spray Spray 1 spray into both nostrils daily       multivitamin, therapeutic with minerals (MULTI-VITAMIN) TABS tablet Take 1 tablet by mouth daily       cholecalciferol (VITAMIN D3) 1000 UNIT tablet Take 2,500 Units by mouth daily       Cyanocobalamin (VITAMIN B 12 PO) Take 250 mcg by mouth every 7 days       levothyroxine (SYNTHROID, LEVOTHROID) 75 MCG tablet Take 1 tablet (75 mcg) by mouth daily 90 tablet 3     albuterol (PROAIR HFA, PROVENTIL HFA, VENTOLIN HFA) 108 (90 BASE) MCG/ACT inhaler Inhale 2 puffs into the lungs every 6 hours as needed for shortness of breath / dyspnea or wheezing 3 Inhaler 1     estradiol (VIVELLE-DOT) 0.0375 MG/24HR BIW patch Place 1 patch onto the skin twice a week 24 patch 3       MEDICATIONS STARTED ON CURRENT VISIT:  Orders Placed This Encounter   Medications     fluticasone (FLONASE) 50 MCG/ACT spray     Sig: Spray 1 spray into both nostrils daily     multivitamin, therapeutic with minerals (MULTI-VITAMIN) TABS tablet     Sig: Take 1 tablet by mouth daily     cholecalciferol (VITAMIN D3) 1000 UNIT tablet     Sig: Take 2,500 Units by mouth daily     Cyanocobalamin (VITAMIN B 12 PO)     Sig: Take 250 mcg by mouth every 7 days       MEDICATIONS DISCONTINUED ON CURRENT VISIT:  There are no discontinued  "medications.    ALLERGIES:  Allergies   Allergen Reactions     No Known Drug Allergies      Seasonal Allergies      Adhesive Tape Rash       PAST MEDICAL HISTORY:    Past Medical History:   Diagnosis Date     Asthma      BCC (basal cell carcinoma of skin) 2010    rt shoulder     Endometrial ca (H)     stage 1     Family hx of colon cancer      GERD (gastroesophageal reflux disease)      Hormone replacement therapy      Hyperlipidemia LDL goal <130 11/4/2016     Hypothyroidism      Nonrheumatic aortic valve insufficiency 11/4/2016     PONV (postoperative nausea and vomiting)      Shoulder arthritis     rt     Thoracic aortic aneurysm without rupture (H)     4.3 cm     Vitamin D deficiency 11/4/2016       PAST SURGICAL HISTORY:    Past Surgical History:   Procedure Laterality Date     C VAG HYST,UTERUS >250 GMS,REM TUBE/OVARY  2000     COLONOSCOPY       COLONOSCOPY N/A 12/12/2016    Procedure: COLONOSCOPY;  Surgeon: Manjinder Vera MD;  Location:  GI     TONSILLECTOMY         FAMILY HISTORY:    Family History   Problem Relation Age of Onset     DIABETES Father        SOCIAL HISTORY:    Social History     Social History     Marital status: Single     Spouse name: N/A     Number of children: N/A     Years of education: N/A     Social History Main Topics     Smoking status: Never Smoker     Smokeless tobacco: Never Used     Alcohol use Yes      Comment: 1 glass wine per week     Drug use: No     Sexual activity: Not Currently     Partners: Male     Other Topics Concern     None     Social History Narrative    Single, 2 children           PHYSICAL EXAM:    Vitals: /65  Pulse 70  Ht 1.613 m (5' 3.5\")  Wt 64.8 kg (142 lb 14.4 oz)  BMI 24.92 kg/m2    Constitutional: Comfortable at rest. Cooperative, alert and oriented, well developed, well nourished.  Eyes: Pupils equal and round, conjunctivae and lids unremarkable, sclera white, no xanthalasma,   ENT: Satisfactory dentition.  No cyanosis or pallor.  Neck: " Carotid pulses are full and equal bilaterally, no carotid bruit, no thyromegaly     Respiratory: Normal respiratory effort with symmetrical chest wall movements and no use of accessory muscles. Good air entry with normal breath sounds and no rales or wheeze.  Cardiovascular: Normal jugular venous pulse and pressure.  Normal carotid pulse character and volume.  No carotid bruit.  Regular rhythm.  Normal S1 and S2.  1/6 systolic and diastolic murmur at left sternal border radiating to carotids and apex.  No S3, S4, rub..    GI: Soft, nontender, no hepatosplenomegaly, no masses, active bowel sounds.  Lymph/Hematologic: Not examined.  Skin: No rash, erythema, ecchymosis.  Musculoskeletal: Normal muscle tone and strength. Normal gait. No spinal deformities.  Neuropsychiatric: Oriented to time place and person.  Affect normal.  No gross motor deficits.  Extremities: No edema. No clubbing or deformities.    DIAGNOSTIC DATA:  I reviewed pertinent laboratory data and imaging studies. Results were discussed with the patient.    LABORATORY DATA  -       LIPID RESULTS:  Lab Results   Component Value Date    CHOL 238 (H) 11/04/2016    HDL 80 11/04/2016     (H) 11/04/2016    TRIG 95 11/04/2016    CHOLHDLRATIO 3.15 04/17/2012    CHOLHDLRATIO 2.6 11/13/2007       LIVER ENZYME RESULTS:  Lab Results   Component Value Date    AST 17 11/04/2016    ALT 21 11/04/2016       CBC RESULTS:  Lab Results   Component Value Date    WBC 6.8 06/14/2017    RBC 4.16 06/14/2017    HGB 12.7 06/14/2017    HCT 37.4 06/14/2017    MCV 90 06/14/2017    MCH 30.5 06/14/2017    MCHC 34.0 06/14/2017    RDW 13.4 06/14/2017     06/14/2017       BMP RESULTS:  Lab Results   Component Value Date     11/04/2016    POTASSIUM 4.4 11/04/2016    CHLORIDE 107 11/04/2016    CO2 28 11/04/2016    ANIONGAP 6 11/04/2016    GLC 88 11/04/2016    BUN 16 11/04/2016    CR 0.69 11/04/2016    GFRESTIMATED 85 11/04/2016    GFRESTBLACK >90   GFR  Calc   11/04/2016    HAWK 9.0 11/04/2016        A1C RESULTS:  No results found for: A1C    INR RESULTS:  No results found for: INR    ECG: sinus rhythm with mild left atrial abnormality     CHEST X-RAY: not applicable     TRANSTHORACIC ECHOCARDIOGRAM: Images reviewed with the patient.        DIAGNOSES:  1 dilated thoracic aorta.   2.  Dyspnea on exertion  3.  Hypercholesterolemia positive family history for coronary disease  4.  Mild asthma      ASSESSMENT:  the patient presents with multiple concerns regarding her dilated thoracic aorta.  It appears to been between 4.0 and 4.5 cm over the past 10 years measured by echocardiography and CT scan.  MRA of the tests performed to evaluate without use of radiation.  Stress echocardiography is reasonable to evaluate symptoms of dyslexia on exertion to rule out possible coronary artery disease she also need close follow-up of her serum lipids.    PLAN:  1.  Stress echocardiogram to evaluate ischemic status of myocardium   2.  Chest MRA to evaluate thoracic aorta  3.  Consider Statin therapy for elevated lipids  4.  Routine medical follow-up  5.  Cardiology follow-up in six months or sooner depending upon results of test         Encounter Diagnoses   Name Primary?     Nonrheumatic aortic valve insufficiency Yes     Thoracic aortic aneurysm without rupture (H)      Mixed hyperlipidemia      DALEY (dyspnea on exertion)        Orders Placed This Encounter   Procedures     MRI Angiogram chest w & w/o contrast     Basic metabolic panel     Lipid Profile     ALT     Follow-Up with Cardiologist     Exercise Stress Echocardiogram     Thank you for allowing me to participate in the care of your patient.    Sincerely,     Michael Bustamante MD     [facility]    cc:   Caprice Lares MD  [facility]   [address]  [facility] [address]

## 2017-07-24 ENCOUNTER — TELEPHONE (OUTPATIENT)
Dept: CARDIOLOGY | Facility: CLINIC | Age: 67
End: 2017-07-24

## 2017-07-24 NOTE — TELEPHONE ENCOUNTER
Patient called stating she is scheduled for a MRI angiogram chest 7-27 and Insurance has to be called for pre approval.  Will message PA team.

## 2017-07-25 NOTE — TELEPHONE ENCOUNTER
Spoke with patient. Awaiting Insurance approval from [facility] office.  Spoke with patient who is aware and will call back tomorrow afternoon to check if approved.

## 2017-07-25 NOTE — TELEPHONE ENCOUNTER
Called  Financial office, left message requesting review of insurance for test scheduled 7-27-17. Requested return call.

## 2017-07-26 NOTE — TELEPHONE ENCOUNTER
Spoke with  financial office, Stefanie, Insurance has approved testing as scheduled 7-27-17. Recommendation was to notify patient.  Attempted to contact patient, message left that approval was verbally received by Business office  awaiting written approval. Patient to check at check in tomorrow that written approval was received.

## 2017-07-27 ENCOUNTER — HOSPITAL ENCOUNTER (OUTPATIENT)
Dept: CARDIOLOGY | Facility: CLINIC | Age: 67
End: 2017-07-27
Attending: INTERNAL MEDICINE
Payer: COMMERCIAL

## 2017-07-27 ENCOUNTER — TELEPHONE (OUTPATIENT)
Dept: CARDIOLOGY | Facility: CLINIC | Age: 67
End: 2017-07-27

## 2017-07-27 DIAGNOSIS — R06.09 DOE (DYSPNEA ON EXERTION): ICD-10-CM

## 2017-07-27 DIAGNOSIS — I71.20 THORACIC AORTIC ANEURYSM WITHOUT RUPTURE (H): ICD-10-CM

## 2017-07-27 DIAGNOSIS — I35.1 NONRHEUMATIC AORTIC VALVE INSUFFICIENCY: Primary | ICD-10-CM

## 2017-07-27 DIAGNOSIS — E78.2 MIXED HYPERLIPIDEMIA: ICD-10-CM

## 2017-07-27 DIAGNOSIS — I35.1 NONRHEUMATIC AORTIC VALVE INSUFFICIENCY: ICD-10-CM

## 2017-07-27 LAB
CREAT BLD-MCNC: 0.7 MG/DL (ref 0.52–1.04)
GFR SERPL CREATININE-BSD FRML MDRD: 83 ML/MIN/1.7M2

## 2017-07-27 PROCEDURE — 25000128 H RX IP 250 OP 636: Performed by: INTERNAL MEDICINE

## 2017-07-27 PROCEDURE — 71555 MRI ANGIO CHEST W OR W/O DYE: CPT

## 2017-07-27 PROCEDURE — 71555 MRI ANGIO CHEST W OR W/O DYE: CPT | Mod: 26 | Performed by: INTERNAL MEDICINE

## 2017-07-27 PROCEDURE — 82565 ASSAY OF CREATININE: CPT

## 2017-07-27 PROCEDURE — A9585 GADOBUTROL INJECTION: HCPCS | Performed by: INTERNAL MEDICINE

## 2017-07-27 RX ORDER — GADOBUTROL 604.72 MG/ML
5-65 INJECTION INTRAVENOUS ONCE
Status: COMPLETED | OUTPATIENT
Start: 2017-07-27 | End: 2017-07-27

## 2017-07-27 RX ADMIN — GADOBUTROL 15 ML: 604.72 INJECTION INTRAVENOUS at 09:16

## 2017-07-27 NOTE — TELEPHONE ENCOUNTER
Patient here for tests Stress echo and MRI. Check in desk asking if tests have PA approval. Confirmed with   Financial office. MRI has been approved and has an approval number . Stress echo PA is pending. Financial office called insurance this AM and PA pending. Spoke with check in desk informed  Stress echo PA pending. Business office trying to contact insurance, test may need to be re scheduled.

## 2017-07-27 NOTE — TELEPHONE ENCOUNTER
MRI Chest 7-27-17  1. The ascending aorta is mildly dilated. The aortic root, transverse arch and descending thoracic aorta  are normal in size without an aneurysm or dissection.  2. The aortic arch is left sided. There is normal branching of the arch vessels. There is no coarctation.  3. The main and proximal branch pulmonary arteries are normal in size.   4. The systemic venous connections are normal.   CONCLUSIONS: The ascending aorta is mildly dilated with maximal diameter of 4.2 cm. The descending aorta is  normal in size.   ReaderL Dr. Morris  Stress echo to be rescheduled as PA approval for test not received yet from Insurance.

## 2017-08-03 NOTE — TELEPHONE ENCOUNTER
PA Test Team called. A peer. to peer   appeal can be tried to approval for stress echo. # [phone], ID [id number]  Ref# [id number]. Rubin contacted. Nurse to Nurse review done  - Chart reviewed. Consideration appeal in process, determination should be made by 8-7-17.   If stress echo denied, atternative possibility for testing could be a Treadmill test without imaging.

## 2017-08-03 NOTE — TELEPHONE ENCOUNTER
Spoke with Christiane in [place] for testing. Per [employer] Stress echo ICD Code  are just not covered. Christiane, in [place] Center will try again and ask what alternative test is covered for dyspnea on exertion and to evaluate ischemic status of myocardium.

## 2017-08-08 NOTE — TELEPHONE ENCOUNTER
Per PA team. Stress echo was approved through Oct 23, 2017. Message sent to scheduling.   Message sent to scheduling.

## 2017-08-11 ENCOUNTER — HOSPITAL ENCOUNTER (OUTPATIENT)
Dept: CARDIOLOGY | Facility: CLINIC | Age: 67
Discharge: HOME | End: 2017-08-11
Attending: INTERNAL MEDICINE | Admitting: INTERNAL MEDICINE
Payer: COMMERCIAL

## 2017-08-11 ENCOUNTER — DOCUMENTATION ONLY (OUTPATIENT)
Dept: CARDIOLOGY | Facility: CLINIC | Age: 67
End: 2017-08-11

## 2017-08-11 DIAGNOSIS — I35.1 NONRHEUMATIC AORTIC VALVE INSUFFICIENCY: ICD-10-CM

## 2017-08-11 PROCEDURE — 93016 CV STRESS TEST SUPVJ ONLY: CPT | Performed by: INTERNAL MEDICINE

## 2017-08-11 PROCEDURE — 93350 STRESS TTE ONLY: CPT | Mod: 26 | Performed by: INTERNAL MEDICINE

## 2017-08-11 PROCEDURE — 25500064 ZZH RX 255 OP 636: Performed by: INTERNAL MEDICINE

## 2017-08-11 PROCEDURE — 93018 CV STRESS TEST I&R ONLY: CPT | Performed by: INTERNAL MEDICINE

## 2017-08-11 PROCEDURE — 93321 DOPPLER ECHO F-UP/LMTD STD: CPT | Mod: 26 | Performed by: INTERNAL MEDICINE

## 2017-08-11 PROCEDURE — 93325 DOPPLER ECHO COLOR FLOW MAPG: CPT | Mod: 26 | Performed by: INTERNAL MEDICINE

## 2017-08-11 PROCEDURE — 40000264 ECHO STRESS TEST WITH LUMASON

## 2017-08-11 RX ADMIN — SULFUR HEXAFLUORIDE 5 ML: KIT at 09:39

## 2017-08-16 NOTE — TELEPHONE ENCOUNTER
Needs close f/u with PMD for BP control and lipid management; can see again in six months if desired.npc

## 2017-08-16 NOTE — PROGRESS NOTES
Needs close f/u with PMD for BP control and lipid management; can see again in six months if desired.npc  Spoke with patient and stress echo results reviewed. Patient states she would like to see Dr. Bsutamante once more in 6 months, but will follow up with PMD for BP and lipids.

## 2017-10-30 ENCOUNTER — TELEPHONE (OUTPATIENT)
Dept: FAMILY MEDICINE | Facility: CLINIC | Age: 67
End: 2017-10-30

## 2017-11-06 ENCOUNTER — OFFICE VISIT (OUTPATIENT)
Dept: FAMILY MEDICINE | Facility: CLINIC | Age: 67
End: 2017-11-06
Payer: COMMERCIAL

## 2017-11-06 VITALS
HEART RATE: 68 BPM | WEIGHT: 136 LBS | SYSTOLIC BLOOD PRESSURE: 110 MMHG | HEIGHT: 64 IN | BODY MASS INDEX: 23.22 KG/M2 | TEMPERATURE: 97.2 F | OXYGEN SATURATION: 98 % | DIASTOLIC BLOOD PRESSURE: 59 MMHG

## 2017-11-06 DIAGNOSIS — Z79.890 HORMONE REPLACEMENT THERAPY: ICD-10-CM

## 2017-11-06 DIAGNOSIS — E03.9 ACQUIRED HYPOTHYROIDISM: ICD-10-CM

## 2017-11-06 DIAGNOSIS — M19.019 SHOULDER ARTHRITIS: ICD-10-CM

## 2017-11-06 DIAGNOSIS — E78.2 MIXED HYPERLIPIDEMIA: ICD-10-CM

## 2017-11-06 DIAGNOSIS — N60.19 FIBROCYSTIC BREAST DISEASE, UNSPECIFIED LATERALITY: ICD-10-CM

## 2017-11-06 DIAGNOSIS — I71.20 THORACIC AORTIC ANEURYSM WITHOUT RUPTURE (H): ICD-10-CM

## 2017-11-06 DIAGNOSIS — Z00.00 ROUTINE GENERAL MEDICAL EXAMINATION AT A HEALTH CARE FACILITY: Primary | ICD-10-CM

## 2017-11-06 DIAGNOSIS — M89.9 DISORDER OF BONE AND CARTILAGE: ICD-10-CM

## 2017-11-06 DIAGNOSIS — M94.9 DISORDER OF BONE AND CARTILAGE: ICD-10-CM

## 2017-11-06 DIAGNOSIS — Z23 NEED FOR PROPHYLACTIC VACCINATION AND INOCULATION AGAINST INFLUENZA: ICD-10-CM

## 2017-11-06 DIAGNOSIS — J45.20 MILD INTERMITTENT ASTHMA WITHOUT COMPLICATION: ICD-10-CM

## 2017-11-06 LAB
ANION GAP SERPL CALCULATED.3IONS-SCNC: 8 MMOL/L (ref 3–14)
BUN SERPL-MCNC: 18 MG/DL (ref 7–30)
CALCIUM SERPL-MCNC: 9.3 MG/DL (ref 8.5–10.1)
CHLORIDE SERPL-SCNC: 104 MMOL/L (ref 94–109)
CHOLEST SERPL-MCNC: 239 MG/DL
CO2 SERPL-SCNC: 26 MMOL/L (ref 20–32)
CREAT SERPL-MCNC: 0.67 MG/DL (ref 0.52–1.04)
GFR SERPL CREATININE-BSD FRML MDRD: 88 ML/MIN/1.7M2
GLUCOSE SERPL-MCNC: 83 MG/DL (ref 70–99)
HDLC SERPL-MCNC: 78 MG/DL
LDLC SERPL CALC-MCNC: 143 MG/DL
NONHDLC SERPL-MCNC: 161 MG/DL
POTASSIUM SERPL-SCNC: 4.1 MMOL/L (ref 3.4–5.3)
SODIUM SERPL-SCNC: 138 MMOL/L (ref 133–144)
TRIGL SERPL-MCNC: 91 MG/DL

## 2017-11-06 PROCEDURE — 80061 LIPID PANEL: CPT | Performed by: INTERNAL MEDICINE

## 2017-11-06 PROCEDURE — 80048 BASIC METABOLIC PNL TOTAL CA: CPT | Performed by: INTERNAL MEDICINE

## 2017-11-06 PROCEDURE — 36415 COLL VENOUS BLD VENIPUNCTURE: CPT | Performed by: INTERNAL MEDICINE

## 2017-11-06 PROCEDURE — 90662 IIV NO PRSV INCREASED AG IM: CPT | Performed by: INTERNAL MEDICINE

## 2017-11-06 PROCEDURE — 99397 PER PM REEVAL EST PAT 65+ YR: CPT | Mod: 25 | Performed by: INTERNAL MEDICINE

## 2017-11-06 PROCEDURE — G0008 ADMIN INFLUENZA VIRUS VAC: HCPCS | Performed by: INTERNAL MEDICINE

## 2017-11-06 RX ORDER — MULTIPLE VITAMINS W/ MINERALS TAB 9MG-400MCG
1 TAB ORAL DAILY
Qty: 30 EACH | Refills: 11 | Status: SHIPPED | OUTPATIENT
Start: 2017-11-06

## 2017-11-06 RX ORDER — ALBUTEROL SULFATE 90 UG/1
2 AEROSOL, METERED RESPIRATORY (INHALATION) EVERY 6 HOURS PRN
Qty: 3 INHALER | Refills: 1 | Status: SHIPPED | OUTPATIENT
Start: 2017-11-06 | End: 2018-11-08

## 2017-11-06 RX ORDER — LEVOTHYROXINE SODIUM 75 UG/1
75 TABLET ORAL DAILY
Qty: 90 TABLET | Refills: 3 | Status: SHIPPED | OUTPATIENT
Start: 2017-11-06 | End: 2018-11-08

## 2017-11-06 RX ORDER — ESTRADIOL 0.1 MG/D
1 FILM, EXTENDED RELEASE TRANSDERMAL
Qty: 12 PATCH | Refills: 11 | Status: SHIPPED | OUTPATIENT
Start: 2017-11-06 | End: 2018-11-08

## 2017-11-06 RX ORDER — CHOLECALCIFEROL (VITAMIN D3) 50 MCG
1 CAPSULE ORAL DAILY
Qty: 100 TABLET | Refills: 1 | Status: SHIPPED | OUTPATIENT
Start: 2017-11-06 | End: 2019-11-11

## 2017-11-06 NOTE — NURSING NOTE
"Chief Complaint   Patient presents with     Wellness Visit     fasting     Flu Shot       Initial /59 (BP Location: Left arm, Cuff Size: Adult Regular)  Pulse 68  Temp 97.2  F (36.2  C) (Oral)  Ht 5' 3.5\" (1.613 m)  Wt 136 lb (61.7 kg)  SpO2 98%  BMI 23.71 kg/m2 Estimated body mass index is 23.71 kg/(m^2) as calculated from the following:    Height as of this encounter: 5' 3.5\" (1.613 m).    Weight as of this encounter: 136 lb (61.7 kg).  Medication Reconciliation: complete       Susanna Montenegro CMA      "

## 2017-11-06 NOTE — MR AVS SNAPSHOT
After Visit Summary   11/6/2017    Renea Lerma    MRN: [id number]           Patient Information     Date Of Birth          1950        Visit Information        Provider Department      11/6/2017 8:30 AM Caprice Lares MD [facility]        Today's Diagnoses     Routine general medical examination at a health care facility    -  1    Acquired hypothyroidism        Thoracic aortic aneurysm without rupture (H)        Mild intermittent asthma without complication        Shoulder arthritis        Mixed hyperlipidemia        Fibrocystic breast disease, unspecified laterality        Disorder of bone and cartilage        Hormone replacement therapy        Need for prophylactic vaccination and inoculation against influenza          Care Instructions      Preventive Health Recommendations    Female Ages 65 +    Yearly exam:     See your health care provider every year in order to  o Review health changes.   o Discuss preventive care.    o Review your medicines if your doctor has prescribed any.      You no longer need a yearly Pap test unless you've had an abnormal Pap test in the past 10 years. If you have vaginal symptoms, such as bleeding or discharge, be sure to talk with your provider about a Pap test.      Every 1 to 2 years, have a mammogram.  If you are over 69, talk with your health care provider about whether or not you want to continue having screening mammograms.      Every 10 years, have a colonoscopy. Or, have a yearly FIT test (stool test). These exams will check for colon cancer.       Have a cholesterol test every 5 years, or more often if your doctor advises it.       Have a diabetes test (fasting glucose) every three years. If you are at risk for diabetes, you should have this test more often.       At age 65, have a bone density scan (DEXA) to check for osteoporosis (brittle bone disease).    Shots:    Get a flu shot each year.    Get a tetanus shot every 10 years.    Talk  to your doctor about your pneumonia vaccines. There are now two you should receive - Pneumovax (PPSV 23) and Prevnar (PCV 13).    Talk to your doctor about the shingles vaccine.    Talk to your doctor about the hepatitis B vaccine.    Nutrition:     Eat at least 5 servings of fruits and vegetables each day.      Eat whole-grain bread, whole-wheat pasta and brown rice instead of white grains and rice.      Talk to your provider about Calcium and Vitamin D.     Lifestyle    Exercise at least 150 minutes a week (30 minutes a day, 5 days a week). This will help you control your weight and prevent disease.      Limit alcohol to one drink per day.      No smoking.       Wear sunscreen to prevent skin cancer.       See your dentist twice a year for an exam and cleaning.      See your eye doctor every 1 to 2 years to screen for conditions such as glaucoma, macular degeneration and cataracts.          Follow-ups after your visit        Future tests that were ordered for you today     Open Future Orders        Priority Expected Expires Ordered    DEXA HIP/PELVIS/SPINE - Future Routine  11/6/2018 11/6/2017            Who to contact     If you have questions or need follow up information about today's clinic visit or your schedule please contact [facility] directly at [phone].  Normal or non-critical lab and imaging results will be communicated to you by Ploongehart, letter or phone within 4 business days after the clinic has received the results. If you do not hear from us within 7 days, please contact the clinic through Ploongehart or phone. If you have a critical or abnormal lab result, we will notify you by phone as soon as possible.  Submit refill requests through CatchThatBus or call your pharmacy and they will forward the refill request to us. Please allow 3 business days for your refill to be completed.          Additional Information About Your Visit        CatchThatBus Information     CatchThatBus gives you secure access to  "your electronic health record. If you see a primary care provider, you can also send messages to your care team and make appointments. If you have questions, please call your primary care clinic.  If you do not have a primary care provider, please call [phone] and they will assist you.        Care EveryWhere ID     This is your Care EveryWhere ID. This could be used by other organizations to access your [place] medical records  [id number]        Your Vitals Were     Pulse Temperature Height Pulse Oximetry BMI (Body Mass Index)       68 97.2  F (36.2  C) (Oral) 5' 3.5\" (1.613 m) 98% 23.71 kg/m2        Blood Pressure from Last 3 Encounters:   11/06/17 110/59   07/21/17 112/65   06/14/17 102/59    Weight from Last 3 Encounters:   11/06/17 136 lb (61.7 kg)   07/21/17 142 lb 14.4 oz (64.8 kg)   06/14/17 143 lb 12.8 oz (65.2 kg)              We Performed the Following     BASIC METABOLIC PANEL     FLU VACCINE, INCREASED ANTIGEN, PRESV FREE, AGE 65+ [77697]     Lipid panel reflex to direct LDL Fasting     Vaccine Administration, Initial [03875]          Today's Medication Changes          These changes are accurate as of: 11/6/17  9:40 AM.  If you have any questions, ask your nurse or doctor.               Start taking these medicines.        Dose/Directions    4X PROBIOTIC Tabs   Used for:  Routine general medical examination at a health care facility   Started by:  Caprice Lares MD        Dose:  1 each   Take 1 each by mouth daily   Quantity:  100 tablet   Refills:  1         These medicines have changed or have updated prescriptions.        Dose/Directions    * estradiol 0.0375 MG/24HR BIW patch   Commonly known as:  VIVELLE-DOT   This may have changed:  Another medication with the same name was added. Make sure you understand how and when to take each.   Used for:  Fibrocystic breast disease, unspecified laterality   Changed by:  Sasha Mejia APRN CNP        Dose:  1 patch   Place 1 patch onto the " skin twice a week   Quantity:  24 patch   Refills:  3       * estradiol 0.1 MG/24HR BIW patch   Commonly known as:  VIVELLE-DOT   This may have changed:  You were already taking a medication with the same name, and this prescription was added. Make sure you understand how and when to take each.   Used for:  Hormone replacement therapy   Changed by:  Caprice Lares MD        Dose:  1 patch   Place 1 patch onto the skin twice a week   Quantity:  12 patch   Refills:  11       * Notice:  This list has 2 medication(s) that are the same as other medications prescribed for you. Read the directions carefully, and ask your doctor or other care provider to review them with you.         Where to get your medicines      These medications were sent to [facility]/pharmacy #[id number] - [place], [place] - [address]  [address] [address]     Phone:  [phone]     albuterol 108 (90 BASE) MCG/ACT Inhaler    estradiol 0.1 MG/24HR BIW patch    levothyroxine 75 MCG tablet         Some of these will need a paper prescription and others can be bought over the counter.  Ask your nurse if you have questions.     Bring a paper prescription for each of these medications     4X PROBIOTIC Tabs    cholecalciferol 1000 UNIT tablet    multivitamin, therapeutic with minerals Tabs tablet                Primary Care Provider Office Phone # Fax #    Caprice Lares [phone] [address] [facility] [address]  [facility] [address]        Equal Access to Services     [facility] AH: Hadii alicia perezo Sokylee, waaxda luqadaha, qaybta kaalmada narinder, dexter smith . So [facility] [phone].    ATENCIÓN: Si habla español, tiene a arcos disposición servicios gratuitos de asistencia lingüística. Llame al [phone].    We comply with applicable federal civil rights laws and [place] laws. We do not discriminate on the basis of race, color, national origin, age, disability, sex, sexual orientation, or gender  identity.            Thank you!     Thank you for choosing [facility]  for your care. Our goal is always to provide you with excellent care. Hearing back from our patients is one way we can continue to improve our services. Please take a few minutes to complete the written survey that you may receive in the mail after your visit with us. Thank you!             Your Updated Medication List - Protect others around you: Learn how to safely use, store and throw away your medicines at www.disposemymeds.org.          This list is accurate as of: 11/6/17  9:40 AM.  Always use your most recent med list.                   Brand Name Dispense Instructions for use Diagnosis    4X PROBIOTIC Tabs     100 tablet    Take 1 each by mouth daily    Routine general medical examination at a health care facility       albuterol 108 (90 BASE) MCG/ACT Inhaler    PROAIR HFA/PROVENTIL HFA/VENTOLIN HFA    3 Inhaler    Inhale 2 puffs into the lungs every 6 hours as needed for shortness of breath / dyspnea or wheezing    Mild intermittent asthma without complication       cholecalciferol 1000 UNIT tablet    vitamin D3    30 tablet    Take 2.5 tablets (2,500 Units) by mouth daily        * estradiol 0.0375 MG/24HR BIW patch    VIVELLE-DOT    24 patch    Place 1 patch onto the skin twice a week    Fibrocystic breast disease, unspecified laterality       * estradiol 0.1 MG/24HR BIW patch    VIVELLE-DOT    12 patch    Place 1 patch onto the skin twice a week    Hormone replacement therapy       fluticasone 50 MCG/ACT spray    FLONASE     Spray 1 spray into both nostrils daily        levothyroxine 75 MCG tablet    SYNTHROID/LEVOTHROID    90 tablet    Take 1 tablet (75 mcg) by mouth daily    Acquired hypothyroidism       multivitamin, therapeutic with minerals Tabs tablet     30 each    Take 1 tablet by mouth daily    Routine general medical examination at a health care facility       VITAMIN B 12 PO      Take 250 mcg by mouth every 7  days        * Notice:  This list has 2 medication(s) that are the same as other medications prescribed for you. Read the directions carefully, and ask your doctor or other care provider to review them with you.

## 2017-11-06 NOTE — PROGRESS NOTES
SUBJECTIVE:   Renea Lerma is a 67 year old female who presents for Preventive Visit.    Patient saw cardiology also  MRI done  Clinical history: 67-year-old female with dilated ascending aorta 4.3 cm on echocardiogram.   Comparison MRA: None     1. The ascending aorta is mildly dilated. The aortic root, transverse arch and descending thoracic aorta  are normal in size without an aneurysm or dissection.     2. The aortic arch is left sided. There is normal branching of the arch vessels. There is no coarctation.     3. The main and proximal branch pulmonary arteries are normal in size.      4. The systemic venous connections are normal.      CONCLUSIONS: The ascending aorta is mildly dilated with maximal diameter of 4.2 cm. The descending aorta is  normal in size.     Physical   Annual:     Getting at least 3 servings of Calcium per day::  Yes    Bi-annual eye exam::  Yes    Dental care twice a year::  Yes    Sleep apnea or symptoms of sleep apnea::  None    Diet::  Regular (no restrictions)    Frequency of exercise::  2-3 days/week    Duration of exercise::  Other    Taking medications regularly::  Yes    Additional concerns today::  No      COGNITIVE SCREEN  1) Repeat 3 items (Banana, Sunrise, Chair)    2) Clock draw: NORMAL  3) 3 item recall: Recalls 1 object   Results: NORMAL clock, 1-2 items recalled: COGNITIVE IMPAIRMENT LESS LIKELY    Mini-CogTM Copyright S Yakelin. Licensed by the author for use in [facility]; reprinted with permission (malik@.[email or web address]). All rights reserved.          Reviewed and updated as needed this visit by clinical staff  Tobacco  Allergies  Meds  Problems  Surg Hx         Reviewed and updated as needed this visit by Provider  Allergies  Meds  Problems  Surg Hx        Social History   Substance Use Topics     Smoking status: Never Smoker     Smokeless tobacco: Never Used     Alcohol use Yes      Comment: 1 glass wine per week       The patient does not drink >3  drinks per day nor >7 drinks per week.              Today's PHQ-2 Score:   PHQ-2 ( 1999 [employer]) 11/6/2017   Q1: Little interest or pleasure in doing things 0   Q2: Feeling down, depressed or hopeless 0   PHQ-2 Score 0   Q1: Little interest or pleasure in doing things -   Q2: Feeling down, depressed or hopeless -   PHQ-2 Score -       Do you feel safe in your environment - Yes    Do you have a Health Care Directive?: No: Advance care planning reviewed with patient; information given to patient to review.      Current providers sharing in care for this patient include: Patient Care Team:  Caprice Lares MD as PCP - General (Internal Medicine)      Hearing impairment: No    Ability to successfully perform activities of daily living: Yes, no assistance needed     Fall risk:  Fallen 2 or more times in the past year?: No  Any fall with injury in the past year?: No      Home safety:  lack of grab bars in the bathroom      The following health maintenance items are reviewed in Epic and correct as of today:  Health Maintenance   Topic Date Due     ASTHMA ACTION PLAN Q1 YR  04/23/1955     ADVANCE DIRECTIVE PLANNING Q5 YRS  04/23/2005     DEXA SCAN SCREENING (SYSTEM ASSIGNED)  04/23/2015     PNEUMOCOCCAL (2 of 2 - PPSV23) 10/01/2016     ASTHMA CONTROL TEST Q3 MOS  06/02/2017     BMP Q1 YR  11/04/2017     LIPID MONITORING Q1 YEAR  11/04/2017     MAMMO SCREEN Q2 YR (SYSTEM ASSIGNED)  01/01/2018     FALL RISK ASSESSMENT  03/02/2018     TSH W/ FREE T4 REFLEX Q1 YEAR  06/14/2018     TETANUS IMMUNIZATION (SYSTEM ASSIGNED)  03/22/2020     COLON CANCER SCREEN (SYSTEM ASSIGNED)  12/12/2026     INFLUENZA VACCINE (SYSTEM ASSIGNED)  Completed     HEPATITIS C SCREENING  Completed     Current Outpatient Prescriptions   Medication Sig Dispense Refill     levothyroxine (SYNTHROID/LEVOTHROID) 75 MCG tablet Take 1 tablet (75 mcg) by mouth daily 90 tablet 3     Probiotic Product (4X PROBIOTIC) TABS Take 1 each by mouth daily 100 tablet 1      "multivitamin, therapeutic with minerals (MULTI-VITAMIN) TABS tablet Take 1 tablet by mouth daily 30 each 11     cholecalciferol (VITAMIN D3) 1000 UNIT tablet Take 2.5 tablets (2,500 Units) by mouth daily 30 tablet 11     albuterol (PROAIR HFA/PROVENTIL HFA/VENTOLIN HFA) 108 (90 BASE) MCG/ACT Inhaler Inhale 2 puffs into the lungs every 6 hours as needed for shortness of breath / dyspnea or wheezing 3 Inhaler 1     fluticasone (FLONASE) 50 MCG/ACT spray Spray 1 spray into both nostrils daily       Cyanocobalamin (VITAMIN B 12 PO) Take 250 mcg by mouth every 7 days       estradiol (VIVELLE-DOT) 0.0375 MG/24HR BIW patch Place 1 patch onto the skin twice a week 24 patch 3     [DISCONTINUED] levothyroxine (SYNTHROID, LEVOTHROID) 75 MCG tablet Take 1 tablet (75 mcg) by mouth daily 90 tablet 3     [DISCONTINUED] albuterol (PROAIR HFA, PROVENTIL HFA, VENTOLIN HFA) 108 (90 BASE) MCG/ACT inhaler Inhale 2 puffs into the lungs every 6 hours as needed for shortness of breath / dyspnea or wheezing 3 Inhaler 1     Allergies   Allergen Reactions     No Known Drug Allergies      Seasonal Allergies      Adhesive Tape Rash           Review of Systems  Constitutional, HEENT, cardiovascular, pulmonary, GI, , musculoskeletal, neuro, skin, endocrine and psych systems are negative, except as otherwise noted.      OBJECTIVE:   /59 (BP Location: Left arm, Cuff Size: Adult Regular)  Pulse 68  Temp 97.2  F (36.2  C) (Oral)  Ht 5' 3.5\" (1.613 m)  Wt 136 lb (61.7 kg)  SpO2 98%  BMI 23.71 kg/m2 Estimated body mass index is 23.71 kg/(m^2) as calculated from the following:    Height as of this encounter: 5' 3.5\" (1.613 m).    Weight as of this encounter: 136 lb (61.7 kg).  Physical Exam  GENERAL APPEARANCE: healthy, alert and no distress  EYES: Eyes grossly normal to inspection, PERRL and conjunctivae and sclerae normal  HENT: ear canals and TM's normal, nose and mouth without ulcers or lesions, oropharynx clear and oral mucous " membranes moist  NECK: no adenopathy, no asymmetry, masses, or scars and thyroid normal to palpation  RESP: lungs clear to auscultation - no rales, rhonchi or wheezes  BREAST: normal without masses, tenderness or nipple discharge and no palpable axillary masses or adenopathy  CV: regular rate and rhythm, normal S1 S2, no S3 or S4, no murmur, click or rub, no peripheral edema and peripheral pulses strong  ABDOMEN: soft, nontender, no hepatosplenomegaly, no masses and bowel sounds normal  MS: no musculoskeletal defects are noted and gait is age appropriate without ataxia  SKIN: no suspicious lesions or rashes  NEURO: Normal strength and tone, sensory exam grossly normal, mentation intact and speech normal  PSYCH: mentation appears normal and affect normal/bright    ASSESSMENT / PLAN:       ICD-10-CM    1. Routine general medical examination at a health care facility Z00.00 Probiotic Product (4X PROBIOTIC) TABS  Mammogram normal  Cardiology notes appreciated     multivitamin, therapeutic with minerals (MULTI-VITAMIN) TABS tablet   2. Acquired hypothyroidism E03.9 levothyroxine (SYNTHROID/LEVOTHROID) 75 MCG tablet  TSH   Date Value Ref Range Status   06/14/2017 0.80 0.40 - 4.00 mU/L Final   ]     3. Thoracic aortic aneurysm without rupture (H) I71.2 BASIC METABOLIC PANEL    MRI report discussed  Will Repeat imaging 1 year   4. Mild intermittent asthma without complication J45.20 BASIC METABOLIC PANEL     albuterol (PROAIR HFA/PROVENTIL HFA/VENTOLIN HFA) 108 (90 BASE) MCG/ACT Inhaler   5. Shoulder arthritis M19.019 better   6. Mixed hyperlipidemia E78.2 Lipid panel reflex to direct LDL Fasting   7. Fibrocystic breast disease, unspecified laterality N60.19 As above     8. Vitamin D deficiency E55.9 cholecalciferol (VITAMIN D3) 1000 UNIT tablet   9. Need for prophylactic vaccination and inoculation against influenza Z23 FLU VACCINE, INCREASED ANTIGEN, PRESV FREE, AGE 65+ [61479]     Vaccine Administration, Initial [96764]  "  10. Disorder of bone and cartilage M89.9 DEXA HIP/PELVIS/SPINE - Future    M94.9        End of Life Planning:  Patient currently has an advanced directive: No.  I have verified the patient's ablity to prepare an advanced directive/make health care decisions.  Literature was provided to assist patient in preparing an advanced directive.    COUNSELING:  Reviewed preventive health counseling, as reflected in patient instructions       Regular exercise       Healthy diet/nutrition        Estimated body mass index is 23.71 kg/(m^2) as calculated from the following:    Height as of this encounter: 5' 3.5\" (1.613 m).    Weight as of this encounter: 136 lb (61.7 kg).     reports that she has never smoked. She has never used smokeless tobacco.        Appropriate preventive services were discussed with this patient, including applicable screening as appropriate for cardiovascular disease, diabetes, osteopenia/osteoporosis, and glaucoma.  As appropriate for age/gender, discussed screening for colorectal cancer, prostate cancer, breast cancer, and cervical cancer. Checklist reviewing preventive services available has been given to the patient.    Reviewed patients plan of care and provided an AVS. The Basic Care Plan (routine screening as documented in Health Maintenance) for Renea meets the Care Plan requirement. This Care Plan has been established and reviewed with the Patient.    Counseling Resources:  ATP IV Guidelines  Pooled Cohorts Equation Calculator  Breast Cancer Risk Calculator  FRAX Risk Assessment  ICSI Preventive Guidelines  Dietary Guidelines for Americans, 2010  USDA's MyPlate  ASA Prophylaxis  Lung CA Screening    Caprice Lares MD  [facility] for HPI/ROS submitted by the patient on 11/3/2017   PHQ-2 Score: 0    Injectable Influenza Immunization Documentation    1.  Is the person to be vaccinated sick today?   No    2. Does the person to be vaccinated have an allergy to a component   of the " vaccine?   No  Egg Allergy Algorithm Link    3. Has the person to be vaccinated ever had a serious reaction   to influenza vaccine in the past?   No    4. Has the person to be vaccinated ever had Guillain-Barré syndrome?   No    Form completed by patient.    Susanna Montenegro CMA      Prior to injection verified patient identity using patient's name and date of birth.

## 2017-11-07 ASSESSMENT — ASTHMA QUESTIONNAIRES: ACT_TOTALSCORE: 24

## 2017-11-09 ENCOUNTER — TELEPHONE (OUTPATIENT)
Dept: FAMILY MEDICINE | Facility: CLINIC | Age: 67
End: 2017-11-09

## 2017-11-09 NOTE — LETTER
[place] [address] [facility]  [address]  [place]  [address]  Tel: [phone]    November 9, 2017    Renea BUCKLEY [place]  [address] [facility] [address]        Dear MsKenyon Lerma,    Here is a copy of your recent test results.    If you have any further questions or problems, please contact our office.      Sincerely,    Caprice Lares MD/girish    Enclosure: Lab Results  Results for orders placed or performed in visit on 11/06/17   BASIC METABOLIC PANEL   Result Value Ref Range    Sodium 138 133 - 144 mmol/L    Potassium 4.1 3.4 - 5.3 mmol/L    Chloride 104 94 - 109 mmol/L    Carbon Dioxide 26 20 - 32 mmol/L    Anion Gap 8 3 - 14 mmol/L    Glucose 83 70 - 99 mg/dL    Urea Nitrogen 18 7 - 30 mg/dL    Creatinine 0.67 0.52 - 1.04 mg/dL    GFR Estimate 88 >60 mL/min/1.7m2    GFR Estimate If Black >90 >60 mL/min/1.7m2    Calcium 9.3 8.5 - 10.1 mg/dL   Lipid panel reflex to direct LDL Fasting   Result Value Ref Range    Cholesterol 239 (H) <200 mg/dL    Triglycerides 91 <150 mg/dL    HDL Cholesterol 78 >49 mg/dL    LDL Cholesterol Calculated 143 (H) <100 mg/dL    Non HDL Cholesterol 161 (H) <130 mg/dL

## 2017-11-09 NOTE — TELEPHONE ENCOUNTER
Reason for Call:  Form, our goal is to have forms completed with 72 hours, however, some forms may require a visit or additional information.    Type of letter, form or note:  employer-health screening     Who is the form from?: Patient    Where did the form come from: Patient or family brought in       What clinic location was the form placed at?: [facility]    Where the form was placed: Given to physician    What number is listed as a contact on the form?: [phone]       Additional comments: patient gave Dr. Lares the form at her physical on Monday.  She needs to have the form faxed: [phone]. [phone]  And write: CONFIDENTIAL on the cover letter.  2.  Also fax to Renea Lerma at: [phone]    Patient MUST have these faxed in BY tomorrow to be eligible for the discounts.    Please fax asap!      Call taken on 11/9/2017 at 1:49 PM by Kiara Hernandez      .

## 2017-11-09 NOTE — TELEPHONE ENCOUNTER
Please send letter with copy of results.    Thanks,  Caprice Lares      The results of your recent lipid (cholesterol) profile were abnormal.    Here are the results:  Lab Results   Component Value Date    CHOL 239 11/06/2017     Lab Results   Component Value Date    HDL 78 11/06/2017     Lab Results   Component Value Date     11/06/2017     Lab Results   Component Value Date    TRIG 91 11/06/2017     Lab Results   Component Value Date    CHOLHDLRATIO 3.15 04/17/2012    CHOLHDLRATIO 2.6 11/13/2007       Desired or goal levels are:  CHOLESTEROL: Desirable is less than 200.   HDL (Good Cholesterol): Desirable is greater than 40 (for men) greater than 50 (for women).  LDL (Bad Cholesterol): Desirable is less than 130 (or less than 100 if you have heart disease or diabetes). Borderline 130-160.  TRIGLYCERIDES: Desirable is less than 150.  Borderline is 150-200.    Please follow the recommendations below and schedule a lab only appointment in  6 months for a repeat fasting test. Please don't have anything to eat or drink (except water) for 8 hours prior to the test. .    As you may know, an elevated cholesterol is one factor that increases your risk for heart disease and stroke. You can improve your cholesterol by controlling the amount and type of fat you eat and by increasing your daily activity level.    Here are some ways to improve your nutrition:  Eat less fat (especially butter, Crisco and other saturated fats)  Buy lean cuts of meat, reduce your portions of red meat or substitute poultry or fish  Use skim milk and low-fat dairy products  Eat no more than 4 egg yolks per week  Avoid fried or fast foods that are high in fat  Eat more fruits and vegetables      Also consider starting or increasing your aerobic activity. Aerobic activity is the best way to improve HDL (good) cholesterol. If this would be new to you, please talk with me first about what activities are safe for you.    Office Visit on 11/06/2017    Component Date Value Ref Range Status     Sodium 11/06/2017 138  133 - 144 mmol/L Final     Potassium 11/06/2017 4.1  3.4 - 5.3 mmol/L Final     Chloride 11/06/2017 104  94 - 109 mmol/L Final     Carbon Dioxide 11/06/2017 26  20 - 32 mmol/L Final     Anion Gap 11/06/2017 8  3 - 14 mmol/L Final     Glucose 11/06/2017 83  70 - 99 mg/dL Final     Urea Nitrogen 11/06/2017 18  7 - 30 mg/dL Final     Creatinine 11/06/2017 0.67  0.52 - 1.04 mg/dL Final     GFR Estimate 11/06/2017 88  >60 mL/min/1.7m2 Final    Non  GFR Calc     GFR Estimate If Black 11/06/2017 >90  >60 mL/min/1.7m2 Final    African American GFR Calc     Calcium 11/06/2017 9.3  8.5 - 10.1 mg/dL Final     Cholesterol 11/06/2017 239* <200 mg/dL Final    Desirable:       <200 mg/dl     Triglycerides 11/06/2017 91  <150 mg/dL Final     HDL Cholesterol 11/06/2017 78  >49 mg/dL Final     LDL Cholesterol Calculated 11/06/2017 143* <100 mg/dL Final    Comment: Above desirable:  100-129 mg/dl  Borderline High:  130-159 mg/dL  High:             160-189 mg/dL  Very high:       >189 mg/dl       Non HDL Cholesterol 11/06/2017 161* <130 mg/dL Final    Comment: Above Desirable:  130-159 mg/dl  Borderline high:  160-189 mg/dl  High:             190-219 mg/dl  Very high:       >219 mg/dl           Other lab results Glucose (blood sugar) , Kidney test, metabolic profile and Creatinine Level  was / were normal .    .    Please feel free to contact us with any questions or if you would like more information.

## 2017-11-09 NOTE — TELEPHONE ENCOUNTER
Patient is active in MyChart and reviewing messages    Letter sent to patient including results.  Hope Villalobos MA

## 2017-11-16 ENCOUNTER — HOSPITAL ENCOUNTER (OUTPATIENT)
Dept: BONE DENSITY | Facility: CLINIC | Age: 67
Discharge: HOME | End: 2017-11-16
Attending: INTERNAL MEDICINE | Admitting: INTERNAL MEDICINE
Payer: COMMERCIAL

## 2017-11-16 DIAGNOSIS — M89.9 DISORDER OF BONE AND CARTILAGE: ICD-10-CM

## 2017-11-16 DIAGNOSIS — M94.9 DISORDER OF BONE AND CARTILAGE: ICD-10-CM

## 2017-11-16 PROCEDURE — 77080 DXA BONE DENSITY AXIAL: CPT

## 2017-12-04 DIAGNOSIS — I71.20 THORACIC AORTIC ANEURYSM WITHOUT RUPTURE (H): ICD-10-CM

## 2017-12-04 DIAGNOSIS — I35.1 NONRHEUMATIC AORTIC VALVE INSUFFICIENCY: ICD-10-CM

## 2017-12-04 DIAGNOSIS — R06.09 DOE (DYSPNEA ON EXERTION): ICD-10-CM

## 2017-12-04 DIAGNOSIS — R53.83 OTHER FATIGUE: ICD-10-CM

## 2017-12-04 DIAGNOSIS — E78.2 MIXED HYPERLIPIDEMIA: ICD-10-CM

## 2017-12-04 PROCEDURE — 80048 BASIC METABOLIC PNL TOTAL CA: CPT | Performed by: INTERNAL MEDICINE

## 2017-12-04 PROCEDURE — 84460 ALANINE AMINO (ALT) (SGPT): CPT | Performed by: INTERNAL MEDICINE

## 2017-12-04 PROCEDURE — 84443 ASSAY THYROID STIM HORMONE: CPT | Performed by: INTERNAL MEDICINE

## 2017-12-04 PROCEDURE — 80061 LIPID PANEL: CPT | Performed by: INTERNAL MEDICINE

## 2017-12-04 PROCEDURE — 36415 COLL VENOUS BLD VENIPUNCTURE: CPT | Performed by: INTERNAL MEDICINE

## 2017-12-05 LAB
ALT SERPL W P-5'-P-CCNC: 25 U/L (ref 0–50)
ANION GAP SERPL CALCULATED.3IONS-SCNC: 7 MMOL/L (ref 3–14)
BUN SERPL-MCNC: 17 MG/DL (ref 7–30)
CALCIUM SERPL-MCNC: 9.3 MG/DL (ref 8.5–10.1)
CHLORIDE SERPL-SCNC: 106 MMOL/L (ref 94–109)
CHOLEST SERPL-MCNC: 231 MG/DL
CO2 SERPL-SCNC: 26 MMOL/L (ref 20–32)
CREAT SERPL-MCNC: 0.68 MG/DL (ref 0.52–1.04)
GFR SERPL CREATININE-BSD FRML MDRD: 87 ML/MIN/1.7M2
GLUCOSE SERPL-MCNC: 81 MG/DL (ref 70–99)
HDLC SERPL-MCNC: 86 MG/DL
LDLC SERPL CALC-MCNC: 127 MG/DL
NONHDLC SERPL-MCNC: 145 MG/DL
POTASSIUM SERPL-SCNC: 4.2 MMOL/L (ref 3.4–5.3)
SODIUM SERPL-SCNC: 139 MMOL/L (ref 133–144)
TRIGL SERPL-MCNC: 92 MG/DL
TSH SERPL DL<=0.005 MIU/L-ACNC: 0.98 MU/L (ref 0.4–4)

## 2017-12-07 ENCOUNTER — DOCUMENTATION ONLY (OUTPATIENT)
Dept: CARDIOLOGY | Facility: CLINIC | Age: 67
End: 2017-12-07

## 2017-12-07 NOTE — PROGRESS NOTES
Lipids and bmp planned for next office visit drawn at primary clinic 12/4/17. Patient contacted and scheduled for follow up visit 2/12/18. Results letter sent.

## 2017-12-07 NOTE — LETTER
December 7, 2017       TO: Renea Willettrer   [address] [facility] [address]       Dear Ms. Lerma,    The results of your recent Laboratory tests.  Results for orders placed or performed in visit on 12/04/17   Basic metabolic panel   Result Value Ref Range    Sodium 139 133 - 144 mmol/L    Potassium 4.2 3.4 - 5.3 mmol/L    Chloride 106 94 - 109 mmol/L    Carbon Dioxide 26 20 - 32 mmol/L    Anion Gap 7 3 - 14 mmol/L    Glucose 81 70 - 99 mg/dL    Urea Nitrogen 17 7 - 30 mg/dL    Creatinine 0.68 0.52 - 1.04 mg/dL    GFR Estimate 87 >60 mL/min/1.7m2    GFR Estimate If Black >90 >60 mL/min/1.7m2    Calcium 9.3 8.5 - 10.1 mg/dL   Lipid Profile   Result Value Ref Range    Cholesterol 231 (H) <200 mg/dL    Triglycerides 92 <150 mg/dL    HDL Cholesterol 86 >49 mg/dL    LDL Cholesterol Calculated 127 (H) <100 mg/dL    Non HDL Cholesterol 145 (H) <130 mg/dL   ALT   Result Value Ref Range    ALT 25 0 - 50 U/L   **TSH with free T4 reflex FUTURE anytime   Result Value Ref Range    TSH 0.98 0.40 - 4.00 mU/L     Good results, see you for your visit 2/12/18.   Team 2 RNs [phone]

## 2018-01-17 ENCOUNTER — PRE VISIT (OUTPATIENT)
Dept: CARDIOLOGY | Facility: CLINIC | Age: 68
End: 2018-01-17

## 2018-01-17 PROBLEM — R06.09 DYSPNEA ON EXERTION: Status: ACTIVE | Noted: 2018-01-17

## 2018-01-27 DIAGNOSIS — E03.9 ACQUIRED HYPOTHYROIDISM: ICD-10-CM

## 2018-01-27 NOTE — TELEPHONE ENCOUNTER
"Requested Prescriptions   Pending Prescriptions Disp Refills     levothyroxine (SYNTHROID/LEVOTHROID) 75 MCG tablet [Pharmacy Med Name: LEVOTHYROXINE 75 MCG TABLET]  This may be a duplicate med  Last Written Prescription Date:  11/6/17  Last Fill Quantity: 90 tablet,  # refills: 3   Last Office Visit with [facility] provider:  11/6/17 Luda   Future Office Visit:    Next 5 appointments (look out 90 days)     Feb 12, 2018 12:45 PM CST   Return Visit with Michael Bustamante MD   [facility] ([facility])    [address] [place] [id number]   [phone]                90 tablet 3     Sig: TAKE 1 TABLET (75 MCG) BY MOUTH DAILY    Thyroid Protocol Passed    1/27/2018 12:21 AM       Passed - Patient is 12 years or older       Passed - Recent or future visit with authorizing provider's specialty    Patient had office visit in the last year or has a visit in the next 30 days with authorizing provider.  See \"Patient Info\" tab in inbasket, or \"Choose Columns\" in Meds & Orders section of the refill encounter.            Passed - Normal TSH on file in past 12 months    Recent Labs   Lab Test  12/04/17   1636   TSH  0.98             Passed - No active pregnancy on record    If patient is pregnant or has had a positive pregnancy test, please check TSH.         Passed - No positive pregnancy test in past 12 months    If patient is pregnant or has had a positive pregnancy test, please check TSH.            "

## 2018-01-29 RX ORDER — LEVOTHYROXINE SODIUM 75 UG/1
TABLET ORAL
Start: 2018-01-29

## 2018-02-12 ENCOUNTER — OFFICE VISIT (OUTPATIENT)
Dept: CARDIOLOGY | Facility: CLINIC | Age: 68
End: 2018-02-12
Attending: INTERNAL MEDICINE
Payer: COMMERCIAL

## 2018-02-12 VITALS
DIASTOLIC BLOOD PRESSURE: 64 MMHG | WEIGHT: 137 LBS | HEIGHT: 64 IN | BODY MASS INDEX: 23.39 KG/M2 | SYSTOLIC BLOOD PRESSURE: 124 MMHG | HEART RATE: 80 BPM

## 2018-02-12 DIAGNOSIS — I35.1 NONRHEUMATIC AORTIC VALVE INSUFFICIENCY: ICD-10-CM

## 2018-02-12 DIAGNOSIS — I71.20 THORACIC AORTIC ANEURYSM WITHOUT RUPTURE (H): ICD-10-CM

## 2018-02-12 DIAGNOSIS — R06.09 DOE (DYSPNEA ON EXERTION): ICD-10-CM

## 2018-02-12 DIAGNOSIS — E78.2 MIXED HYPERLIPIDEMIA: ICD-10-CM

## 2018-02-12 PROCEDURE — 99214 OFFICE O/P EST MOD 30 MIN: CPT | Performed by: INTERNAL MEDICINE

## 2018-02-12 NOTE — MR AVS SNAPSHOT
After Visit Summary   2/12/2018    Renea Lerma    MRN: [id number]           Patient Information     Date Of Birth          1950        Visit Information        Provider Department      2/12/2018 12:45 PM Michael Bustamante MD [facility]        Today's Diagnoses     Nonrheumatic aortic valve insufficiency        Thoracic aortic aneurysm without rupture (H)        Mixed hyperlipidemia        DALEY (dyspnea on exertion)           Follow-ups after your visit        Additional Services     Follow-Up with Cardiologist                 Your next 10 appointments already scheduled     Nov 08, 2018  9:00 AM CST   PHYSICAL with Caprice Lares MD   [facility] ([facility])    [address] [place] [id number] [phone]              Future tests that were ordered for you today     Open Future Orders        Priority Expected Expires Ordered    Basic metabolic panel Routine 2/12/2019 2/13/2019 2/12/2018    Lipid Profile Routine 2/12/2019 2/12/2019 2/12/2018    ALT Routine 2/12/2019 2/12/2019 2/12/2018    Echocardiogram Routine 2/12/2019 2/13/2019 2/12/2018    Follow-Up with Cardiologist Routine 2/12/2019 2/13/2019 2/12/2018            Who to contact     If you have questions or need follow up information about today's clinic visit or your schedule please contact [facility] directly at [phone].  Normal or non-critical lab and imaging results will be communicated to you by MyChart, letter or phone within 4 business days after the clinic has received the results. If you do not hear from us within 7 days, please contact the clinic through MyChart or phone. If you have a critical or abnormal lab result, we will notify you by phone as soon as possible.  Submit refill requests through [employer] or call your pharmacy and they will forward the refill request to us. Please allow 3 business days for  "your refill to be completed.          Additional Information About Your Visit        MyChart Information     [employer] gives you secure access to your electronic health record. If you see a primary care provider, you can also send messages to your care team and make appointments. If you have questions, please call your primary care clinic.  If you do not have a primary care provider, please call [phone] and they will assist you.        Care EveryWhere ID     This is your Care EveryWhere ID. This could be used by other organizations to access your [facility] medical records  [id number]        Your Vitals Were     Pulse Height BMI (Body Mass Index)             80 1.613 m (5' 3.5\") 23.89 kg/m2          Blood Pressure from Last 3 Encounters:   02/12/18 124/64   11/06/17 110/59   07/21/17 112/65    Weight from Last 3 Encounters:   02/12/18 62.1 kg (137 lb)   11/06/17 61.7 kg (136 lb)   07/21/17 64.8 kg (142 lb 14.4 oz)              We Performed the Following     Follow-Up with Cardiologist        Primary Care Provider Office Phone # Fax #    Bhcarl Lares [phone] [address] [address]        Equal Access to Services     KELSIE ATKINSON : Hadii aad ku hadasho Soomaali, waaxda luqadaha, qaybta kaalmada adeegyada, dexter agrawalin hayankita smith . So [facility] [phone].    ATENCIÓN: Si habla español, tiene a arcos disposición servicios gratuitos de asistencia lingüística. Llhung al [phone].    We comply with applicable federal civil rights laws and [place] laws. We do not discriminate on the basis of race, color, national origin, age, disability, sex, sexual orientation, or gender identity.            Thank you!     Thank you for choosing [facility]  for your care. Our goal is always to provide you with excellent care. Hearing back from our patients is one way we can continue to improve our services. Please take a few minutes to " complete the written survey that you may receive in the mail after your visit with us. Thank you!             Your Updated Medication List - Protect others around you: Learn how to safely use, store and throw away your medicines at www.disposemChina [employer] Holdingseds.org.          This list is accurate as of 2/12/18  1:40 PM.  Always use your most recent med list.                   Brand Name Dispense Instructions for use Diagnosis    4X PROBIOTIC Tabs     100 tablet    Take 1 each by mouth daily    Routine general medical examination at a health care facility       albuterol 108 (90 BASE) MCG/ACT Inhaler    PROAIR HFA/PROVENTIL HFA/VENTOLIN HFA    3 Inhaler    Inhale 2 puffs into the lungs every 6 hours as needed for shortness of breath / dyspnea or wheezing    Mild intermittent asthma without complication       cholecalciferol 1000 UNIT tablet    vitamin D3    30 tablet    Take 2.5 tablets (2,500 Units) by mouth daily        * estradiol 0.1 MG/24HR BIW patch    VIVELLE-DOT    12 patch    Place 1 patch onto the skin twice a week    Hormone replacement therapy       * estradiol 0.0375 MG/24HR BIW patch    VIVELLE-DOT    24 patch    Place 1 patch onto the skin twice a week    Fibrocystic breast disease, unspecified laterality       fluticasone 50 MCG/ACT spray    FLONASE     Spray 1 spray into both nostrils daily        levothyroxine 75 MCG tablet    SYNTHROID/LEVOTHROID    90 tablet    Take 1 tablet (75 mcg) by mouth daily    Acquired hypothyroidism       multivitamin, therapeutic with minerals Tabs tablet     30 each    Take 1 tablet by mouth daily    Routine general medical examination at a health care facility       VITAMIN B 12 PO      Take 250 mcg by mouth every 7 days        * Notice:  This list has 2 medication(s) that are the same as other medications prescribed for you. Read the directions carefully, and ask your doctor or other care provider to review them with you.

## 2018-02-12 NOTE — LETTER
2/12/2018      Caprice Lares MD  [address]  [facility] [address]      RE: Renea MARCIO Florentin       Dear Colleague,    I had the pleasure of seeing Renea BUCKLEY Florentin in the [facility].    Service Date: 02/12/2018      HISTORY OF PRESENT ILLNESS:  The patient is a 67-year-old white female who initially presented to Cardiology Clinic for evaluation of dilated thoracic aorta symptoms of dyspnea on exertion.  Her history for coronary artery disease risk factors is negative for cigarette smoking, hypertension or diabetes mellitus with a positive history of hypercholesterolemia and positive family history of coronary artery disease.  She is postmenopausal, drinks 1 caffeinated beverage per day.  She has been evaluated for chest discomfort in the past as well as left-sided numbness with no evidence of stroke, but evidence of a dilated thoracic aorta by echocardiography and CT scan measuring 4.3-4.5 cm at maximum diameter.  She denies back pain.  She also relates dyspnea on exertion with primarily bicycling up hills, but no flat surface.  She has no history of myocardial infarction, congestive heart failure, rheumatic heart disease or stroke.  She has no wei chest pain.  She is quite concerned about the evaluation and followup of her dilated thoracic aorta and recent symptoms of dyspnea on exertion.  She does not relate significant shortness of breath at rest.  She denies fever, chills or sweats.  She underwent a stress echocardiogram after a clinic visit this past summer, in which it was felt to be normal with no ischemia or infarct noted.  Right ventricle was prominent in size.  There were nondiagnostic ECG changes.  She had good exercise tolerance and stopped because of dyspnea.  The patient also had an MRI scan performed of her chest that showed mild dilatation of the ascending thoracic aorta with normal dimension of the aortic root, transverse arch and descending thoracic aorta.   Aortic arch was left-sided.  There was a normal branch in the arch vessels.  No coarctation.  Maximum diameter was listed at 4.2 cm.  The patient participates in most activities without significant restriction.  She does have some easy fatigability and general malaise at times.      MEDICATIONS:   1.  Estradiol patch.    2.  Levothyroxine 75 mcg a day.   3.  Probiotic 1 per day.   4.  Multivitamins 1 per day.   5.  Vitamin D 1000 units a day.   6.  Albuterol as directed.     7.  Flonase as directed.   8.  Cyanocobalamin 250 mcg per day.      Laboratory data this past December showed cholesterol 231, HDL 86, , triglyceride 92, sodium 139, potassium 4.2, BUN 17, creatinine 0.68, TSH 0.98, ALT 25.      The patient presents to Cardiology Clinic for followup of her previously diagnosed thoracic aortic dilatation as well as previous symptoms of dyspnea on exertion and numbness.      PHYSICAL EXAMINATION:   VITAL SIGNS:  Blood pressure of 124/64 with a heart rate of 80 and regular.  Weight is 137 pounds.   NECK:  Without jugular venous distention, carotid bruit or palpable thyroid.   CHEST:  Essentially clear to percussion and auscultation with slight decreased breath sounds at the bases.   CARDIAC:  Regular rhythm.  There is a soft S1, physiologically split S2.  There is a soft S4 gallop.  There is a 1-2/6 systolic murmur at the left sternal border with minimal radiation.  No diastolic murmur, rub or S3.   EXTREMITIES:  Without cyanosis or edema.      CLINICAL IMPRESSION:   1.  Stable cardiac condition.   2.  Mildly dilated ascending thoracic aorta.   3.  Dyspnea on exertion with easy fatigability and general malaise.   4.  Hypercholesterolemia.   5.  Positive family history of coronary artery disease.   6.  History of mild asthma.      DISCUSSION:  The patient remains quite concerned about her dilated thoracic aorta, although this is mild.  She does not have evidence of significant hypertension.  She has been  reluctant to take much in the way of medication to lower the blood pressure or put less strain on the aorta, such as a beta blocker, even low dose.  She has also been somewhat reluctant to do statin drugs, but prefers to approach it from a dietary point of view.  She will need continued close followup of her serum lipids, basic metabolic panel and blood pressure.  No comment has been made on her CT scan of any calcification of her coronary arteries.  She will consider options with regard to her cholesterol going forward.  Echocardiography will be done later this year to follow aortic dimension and left ventricular function.      RECOMMENDATIONS:   1.  Continue present medications and consider possible statin therapy.   2.  Echocardiography in 4-6 months to follow left ventricular function and aortic root dimension.   3.  Diet and exercise as tolerated.  Would avoid caffeine and salt as well as vigorous upper extremity isometric activity.   4.  Routine medical followup.   5.  Cardiology followup in 4-6 months.      cc:   [place] [address]         CARLY RON MD, [facility]             D: 2018   T: 02/15/2018   MT: daniele      Name:     DEX AGUILAR   MRN:      [id number]        Account:      [id number]   :      1950           Service Date: 2018      Document: [id number]       Outpatient Encounter Prescriptions as of 2018   Medication Sig Dispense Refill     estradiol (VIVELLE-DOT) 0.0375 MG/24HR BIW patch Place 1 patch onto the skin twice a week 24 patch 3     levothyroxine (SYNTHROID/LEVOTHROID) 75 MCG tablet Take 1 tablet (75 mcg) by mouth daily 90 tablet 3     Probiotic Product (4X PROBIOTIC) TABS Take 1 each by mouth daily 100 tablet 1     multivitamin, therapeutic with minerals (MULTI-VITAMIN) TABS tablet Take 1 tablet by mouth daily 30 each 11     cholecalciferol (VITAMIN D3) 1000 UNIT tablet Take 2.5 tablets (2,500  Units) by mouth daily 30 tablet 11     albuterol (PROAIR HFA/PROVENTIL HFA/VENTOLIN HFA) 108 (90 BASE) MCG/ACT Inhaler Inhale 2 puffs into the lungs every 6 hours as needed for shortness of breath / dyspnea or wheezing 3 Inhaler 1     estradiol (VIVELLE-DOT) 0.1 MG/24HR BIW patch Place 1 patch onto the skin twice a week 12 patch 11     fluticasone (FLONASE) 50 MCG/ACT spray Spray 1 spray into both nostrils daily       Cyanocobalamin (VITAMIN B 12 PO) Take 250 mcg by mouth every 7 days       No facility-administered encounter medications on file as of 2/12/2018.      Again, thank you for allowing me to participate in the care of your patient.      Sincerely,    Michael Bustamante MD     [facility]

## 2018-02-12 NOTE — LETTER
2/12/2018    Caprice aLres MD  [address] [facility] [address]  [facility] [address]    RE: Renea MARCIO Florentin       Dear Colleague,    I had the pleasure of seeing Renea BUCKLEY Florentin in the [facility].    Service Date: 02/12/2018      HISTORY OF PRESENT ILLNESS:  The patient is a 67-year-old white female who initially presented to Cardiology Clinic for evaluation of dilated thoracic aorta symptoms of dyspnea on exertion.  Her history for coronary artery disease risk factors is negative for cigarette smoking, hypertension or diabetes mellitus with a positive history of hypercholesterolemia and positive family history of coronary artery disease.  She is postmenopausal, drinks 1 caffeinated beverage per day.  She has been evaluated for chest discomfort in the past as well as left-sided numbness with no evidence of stroke, but evidence of a dilated thoracic aorta by echocardiography and CT scan measuring 4.3-4.5 cm at maximum diameter.  She denies back pain.  She also relates dyspnea on exertion with primarily bicycling up hills, but no flat surface.  She has no history of myocardial infarction, congestive heart failure, rheumatic heart disease or stroke.  She has no wei chest pain.  She is quite concerned about the evaluation and followup of her dilated thoracic aorta and recent symptoms of dyspnea on exertion.  She does not relate significant shortness of breath at rest.  She denies fever, chills or sweats.  She underwent a stress echocardiogram after a clinic visit this past summer, in which it was felt to be normal with no ischemia or infarct noted.  Right ventricle was prominent in size.  There were nondiagnostic ECG changes.  She had good exercise tolerance and stopped because of dyspnea.  The patient also had an MRI scan performed of her chest that showed mild dilatation of the ascending thoracic aorta with normal dimension of the aortic root, transverse arch and descending thoracic aorta.  Aortic  arch was left-sided.  There was a normal branch in the arch vessels.  No coarctation.  Maximum diameter was listed at 4.2 cm.  The patient participates in most activities without significant restriction.  She does have some easy fatigability and general malaise at times.      MEDICATIONS:   1.  Estradiol patch.    2.  Levothyroxine 75 mcg a day.   3.  Probiotic 1 per day.   4.  Multivitamins 1 per day.   5.  Vitamin D 1000 units a day.   6.  Albuterol as directed.     7.  Flonase as directed.   8.  Cyanocobalamin 250 mcg per day.      Laboratory data this past December showed cholesterol 231, HDL 86, , triglyceride 92, sodium 139, potassium 4.2, BUN 17, creatinine 0.68, TSH 0.98, ALT 25.      The patient presents to Cardiology Clinic for followup of her previously diagnosed thoracic aortic dilatation as well as previous symptoms of dyspnea on exertion and numbness.      PHYSICAL EXAMINATION:   VITAL SIGNS:  Blood pressure of 124/64 with a heart rate of 80 and regular.  Weight is 137 pounds.   NECK:  Without jugular venous distention, carotid bruit or palpable thyroid.   CHEST:  Essentially clear to percussion and auscultation with slight decreased breath sounds at the bases.   CARDIAC:  Regular rhythm.  There is a soft S1, physiologically split S2.  There is a soft S4 gallop.  There is a 1-2/6 systolic murmur at the left sternal border with minimal radiation.  No diastolic murmur, rub or S3.   EXTREMITIES:  Without cyanosis or edema.      CLINICAL IMPRESSION:   1.  Stable cardiac condition.   2.  Mildly dilated ascending thoracic aorta.   3.  Dyspnea on exertion with easy fatigability and general malaise.   4.  Hypercholesterolemia.   5.  Positive family history of coronary artery disease.   6.  History of mild asthma.      DISCUSSION:  The patient remains quite concerned about her dilated thoracic aorta, although this is mild.  She does not have evidence of significant hypertension.  She has been reluctant  to take much in the way of medication to lower the blood pressure or put less strain on the aorta, such as a beta blocker, even low dose.  She has also been somewhat reluctant to do statin drugs, but prefers to approach it from a dietary point of view.  She will need continued close followup of her serum lipids, basic metabolic panel and blood pressure.  No comment has been made on her CT scan of any calcification of her coronary arteries.  She will consider options with regard to her cholesterol going forward.  Echocardiography will be done later this year to follow aortic dimension and left ventricular function.      RECOMMENDATIONS:   1.  Continue present medications and consider possible statin therapy.   2.  Echocardiography in 4-6 months to follow left ventricular function and aortic root dimension.   3.  Diet and exercise as tolerated.  Would avoid caffeine and salt as well as vigorous upper extremity isometric activity.   4.  Routine medical followup.   5.  Cardiology followup in 4-6 months.      cc:   Caprice Lares MD   [place] [phone]         CARLY BUSTAMANTE MD, [facility]             D: 2018   T: 02/15/2018   MT: daniele      Name:     DEX AGUILAR   MRN:      [id number]        Account:      [id number]   :      1950           Service Date: 2018      Document: [id number]       Thank you for allowing me to participate in the care of your patient.      Sincerely,     Carly Bustamante MD     [facility]    cc:   Carly Bustamante MD  [address] [phone]

## 2018-02-15 NOTE — PROGRESS NOTES
Service Date: 02/12/2018      HISTORY OF PRESENT ILLNESS:  The patient is a 67-year-old white female who initially presented to Cardiology Clinic for evaluation of dilated thoracic aorta symptoms of dyspnea on exertion.  Her history for coronary artery disease risk factors is negative for cigarette smoking, hypertension or diabetes mellitus with a positive history of hypercholesterolemia and positive family history of coronary artery disease.  She is postmenopausal, drinks 1 caffeinated beverage per day.  She has been evaluated for chest discomfort in the past as well as left-sided numbness with no evidence of stroke, but evidence of a dilated thoracic aorta by echocardiography and CT scan measuring 4.3-4.5 cm at maximum diameter.  She denies back pain.  She also relates dyspnea on exertion with primarily bicycling up hills, but no flat surface.  She has no history of myocardial infarction, congestive heart failure, rheumatic heart disease or stroke.  She has no wei chest pain.  She is quite concerned about the evaluation and followup of her dilated thoracic aorta and recent symptoms of dyspnea on exertion.  She does not relate significant shortness of breath at rest.  She denies fever, chills or sweats.  She underwent a stress echocardiogram after a clinic visit this past summer, in which it was felt to be normal with no ischemia or infarct noted.  Right ventricle was prominent in size.  There were nondiagnostic ECG changes.  She had good exercise tolerance and stopped because of dyspnea.  The patient also had an MRI scan performed of her chest that showed mild dilatation of the ascending thoracic aorta with normal dimension of the aortic root, transverse arch and descending thoracic aorta.  Aortic arch was left-sided.  There was a normal branch in the arch vessels.  No coarctation.  Maximum diameter was listed at 4.2 cm.  The patient participates in most activities without significant restriction.  She does have  some easy fatigability and general malaise at times.      MEDICATIONS:   1.  Estradiol patch.    2.  Levothyroxine 75 mcg a day.   3.  Probiotic 1 per day.   4.  Multivitamins 1 per day.   5.  Vitamin D 1000 units a day.   6.  Albuterol as directed.     7.  Flonase as directed.   8.  Cyanocobalamin 250 mcg per day.      Laboratory data this past December showed cholesterol 231, HDL 86, , triglyceride 92, sodium 139, potassium 4.2, BUN 17, creatinine 0.68, TSH 0.98, ALT 25.      The patient presents to Cardiology Clinic for followup of her previously diagnosed thoracic aortic dilatation as well as previous symptoms of dyspnea on exertion and numbness.      PHYSICAL EXAMINATION:   VITAL SIGNS:  Blood pressure of 124/64 with a heart rate of 80 and regular.  Weight is 137 pounds.   NECK:  Without jugular venous distention, carotid bruit or palpable thyroid.   CHEST:  Essentially clear to percussion and auscultation with slight decreased breath sounds at the bases.   CARDIAC:  Regular rhythm.  There is a soft S1, physiologically split S2.  There is a soft S4 gallop.  There is a 1-2/6 systolic murmur at the left sternal border with minimal radiation.  No diastolic murmur, rub or S3.   EXTREMITIES:  Without cyanosis or edema.      CLINICAL IMPRESSION:   1.  Stable cardiac condition.   2.  Mildly dilated ascending thoracic aorta.   3.  Dyspnea on exertion with easy fatigability and general malaise.   4.  Hypercholesterolemia.   5.  Positive family history of coronary artery disease.   6.  History of mild asthma.      DISCUSSION:  The patient remains quite concerned about her dilated thoracic aorta, although this is mild.  She does not have evidence of significant hypertension.  She has been reluctant to take much in the way of medication to lower the blood pressure or put less strain on the aorta, such as a beta blocker, even low dose.  She has also been somewhat reluctant to do statin drugs, but prefers to approach  it from a dietary point of view.  She will need continued close followup of her serum lipids, basic metabolic panel and blood pressure.  No comment has been made on her CT scan of any calcification of her coronary arteries.  She will consider options with regard to her cholesterol going forward.  Echocardiography will be done later this year to follow aortic dimension and left ventricular function.      RECOMMENDATIONS:   1.  Continue present medications and consider possible statin therapy.   2.  Echocardiography in 4-6 months to follow left ventricular function and aortic root dimension.   3.  Diet and exercise as tolerated.  Would avoid caffeine and salt as well as vigorous upper extremity isometric activity.   4.  Routine medical followup.   5.  Cardiology followup in 4-6 months.      cc:   [place] [address]         CARLY RON MD, [facility]             D: 2018   T: 02/15/2018   MT: daniele      Name:     DEX AGUILAR   MRN:      [id number]        Account:      [id number]   :      1950           Service Date: 2018      Document: [id number]

## 2018-11-01 ENCOUNTER — TRANSFERRED RECORDS (OUTPATIENT)
Dept: HEALTH INFORMATION MANAGEMENT | Facility: CLINIC | Age: 68
End: 2018-11-01

## 2018-11-08 ENCOUNTER — OFFICE VISIT (OUTPATIENT)
Dept: FAMILY MEDICINE | Facility: CLINIC | Age: 68
End: 2018-11-08
Payer: COMMERCIAL

## 2018-11-08 VITALS
OXYGEN SATURATION: 99 % | DIASTOLIC BLOOD PRESSURE: 53 MMHG | SYSTOLIC BLOOD PRESSURE: 113 MMHG | TEMPERATURE: 97.7 F | BODY MASS INDEX: 22.88 KG/M2 | HEART RATE: 65 BPM | HEIGHT: 64 IN | WEIGHT: 134 LBS

## 2018-11-08 DIAGNOSIS — Z00.00 ROUTINE GENERAL MEDICAL EXAMINATION AT A HEALTH CARE FACILITY: Primary | ICD-10-CM

## 2018-11-08 DIAGNOSIS — I71.20 THORACIC AORTIC ANEURYSM WITHOUT RUPTURE (H): ICD-10-CM

## 2018-11-08 DIAGNOSIS — E78.2 MIXED HYPERLIPIDEMIA: ICD-10-CM

## 2018-11-08 DIAGNOSIS — K22.4 NUTCRACKER ESOPHAGUS: ICD-10-CM

## 2018-11-08 DIAGNOSIS — E03.9 ACQUIRED HYPOTHYROIDISM: ICD-10-CM

## 2018-11-08 DIAGNOSIS — J45.20 MILD INTERMITTENT ASTHMA WITHOUT COMPLICATION: ICD-10-CM

## 2018-11-08 DIAGNOSIS — Z23 NEED FOR PROPHYLACTIC VACCINATION AND INOCULATION AGAINST INFLUENZA: ICD-10-CM

## 2018-11-08 DIAGNOSIS — N60.19 FIBROCYSTIC BREAST DISEASE, UNSPECIFIED LATERALITY: ICD-10-CM

## 2018-11-08 DIAGNOSIS — M19.019 SHOULDER ARTHRITIS: ICD-10-CM

## 2018-11-08 DIAGNOSIS — Z12.31 ENCOUNTER FOR SCREENING MAMMOGRAM FOR BREAST CANCER: ICD-10-CM

## 2018-11-08 LAB
ALBUMIN SERPL-MCNC: 3.9 G/DL (ref 3.4–5)
ALP SERPL-CCNC: 61 U/L (ref 40–150)
ALT SERPL W P-5'-P-CCNC: 24 U/L (ref 0–50)
ANION GAP SERPL CALCULATED.3IONS-SCNC: 8 MMOL/L (ref 3–14)
AST SERPL W P-5'-P-CCNC: 25 U/L (ref 0–45)
BILIRUB SERPL-MCNC: 0.6 MG/DL (ref 0.2–1.3)
BUN SERPL-MCNC: 15 MG/DL (ref 7–30)
CALCIUM SERPL-MCNC: 9.3 MG/DL (ref 8.5–10.1)
CHLORIDE SERPL-SCNC: 105 MMOL/L (ref 94–109)
CHOLEST SERPL-MCNC: 217 MG/DL
CO2 SERPL-SCNC: 26 MMOL/L (ref 20–32)
CREAT SERPL-MCNC: 0.66 MG/DL (ref 0.52–1.04)
ERYTHROCYTE [DISTWIDTH] IN BLOOD BY AUTOMATED COUNT: 12.5 % (ref 10–15)
GFR SERPL CREATININE-BSD FRML MDRD: 89 ML/MIN/1.7M2
GLUCOSE SERPL-MCNC: 83 MG/DL (ref 70–99)
HCT VFR BLD AUTO: 37 % (ref 35–47)
HDLC SERPL-MCNC: 77 MG/DL
HGB BLD-MCNC: 12.3 G/DL (ref 11.7–15.7)
LDLC SERPL CALC-MCNC: 121 MG/DL
MCH RBC QN AUTO: 30.1 PG (ref 26.5–33)
MCHC RBC AUTO-ENTMCNC: 33.2 G/DL (ref 31.5–36.5)
MCV RBC AUTO: 91 FL (ref 78–100)
NONHDLC SERPL-MCNC: 140 MG/DL
PLATELET # BLD AUTO: 230 10E9/L (ref 150–450)
POTASSIUM SERPL-SCNC: 4.3 MMOL/L (ref 3.4–5.3)
PROT SERPL-MCNC: 7.1 G/DL (ref 6.8–8.8)
RBC # BLD AUTO: 4.08 10E12/L (ref 3.8–5.2)
SODIUM SERPL-SCNC: 139 MMOL/L (ref 133–144)
TRIGL SERPL-MCNC: 93 MG/DL
TSH SERPL DL<=0.005 MIU/L-ACNC: 1.13 MU/L (ref 0.4–4)
WBC # BLD AUTO: 4.5 10E9/L (ref 4–11)

## 2018-11-08 PROCEDURE — 80053 COMPREHEN METABOLIC PANEL: CPT | Performed by: INTERNAL MEDICINE

## 2018-11-08 PROCEDURE — 84443 ASSAY THYROID STIM HORMONE: CPT | Performed by: INTERNAL MEDICINE

## 2018-11-08 PROCEDURE — 80061 LIPID PANEL: CPT | Performed by: INTERNAL MEDICINE

## 2018-11-08 PROCEDURE — 36415 COLL VENOUS BLD VENIPUNCTURE: CPT | Performed by: INTERNAL MEDICINE

## 2018-11-08 PROCEDURE — 90471 IMMUNIZATION ADMIN: CPT | Performed by: INTERNAL MEDICINE

## 2018-11-08 PROCEDURE — 90662 IIV NO PRSV INCREASED AG IM: CPT | Performed by: INTERNAL MEDICINE

## 2018-11-08 PROCEDURE — 85027 COMPLETE CBC AUTOMATED: CPT | Performed by: INTERNAL MEDICINE

## 2018-11-08 PROCEDURE — 99397 PER PM REEVAL EST PAT 65+ YR: CPT | Mod: 25 | Performed by: INTERNAL MEDICINE

## 2018-11-08 RX ORDER — ESTRADIOL 0.04 MG/D
1 PATCH, EXTENDED RELEASE TRANSDERMAL
Qty: 24 PATCH | Refills: 3 | Status: SHIPPED | OUTPATIENT
Start: 2018-11-08 | End: 2019-11-11

## 2018-11-08 RX ORDER — ALBUTEROL SULFATE 90 UG/1
2 AEROSOL, METERED RESPIRATORY (INHALATION) EVERY 6 HOURS PRN
Qty: 1 INHALER | Refills: 1 | Status: SHIPPED | OUTPATIENT
Start: 2018-11-08 | End: 2020-04-09

## 2018-11-08 RX ORDER — LEVOTHYROXINE SODIUM 75 UG/1
75 TABLET ORAL DAILY
Qty: 90 TABLET | Refills: 3 | Status: SHIPPED | OUTPATIENT
Start: 2018-11-08 | End: 2019-11-11

## 2018-11-08 NOTE — PROGRESS NOTES
Injectable Influenza Immunization Documentation    1.  Is the person to be vaccinated sick today?   No    2. Does the person to be vaccinated have an allergy to a component   of the vaccine?   No  Egg Allergy Algorithm Link    3. Has the person to be vaccinated ever had a serious reaction   to influenza vaccine in the past?   No    4. Has the person to be vaccinated ever had Guillain-Barré syndrome?   No    Form completed by patient.    Prior to injection verified patient identity using patient's name and date of birth.  Due to injection administration, patient instructed to remain in clinic for 15 minutes  afterwards, and to report any adverse reaction to me immediately.    Susanna Montenegro ,CMA

## 2018-11-08 NOTE — Clinical Note
Please abstract the following data from this visit with this patient into the appropriate field in Epic:  Mammogram done on this date: 11/01/2018 (approximately), by this group: [facility], results were Normal.
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
## 2018-11-08 NOTE — MR AVS SNAPSHOT
After Visit Summary   11/8/2018    Renea Lerma    MRN: [id number]           Patient Information     Date Of Birth          1950        Visit Information        Provider Department      11/8/2018 9:00 AM Caprice Lares MD [facility]        Today's Diagnoses     Routine general medical examination at a health care facility    -  1    Nutcracker esophagus        Thoracic aortic aneurysm without rupture (H)        Acquired hypothyroidism        Mixed hyperlipidemia        Mild intermittent asthma without complication        Fibrocystic breast disease, unspecified laterality        Shoulder arthritis        Encounter for screening mammogram for breast cancer          Care Instructions      Preventive Health Recommendations    See your health care provider every year to    Review health changes.     Discuss preventive care.      Review your medicines if your doctor has prescribed any.      You no longer need a yearly Pap test unless you've had an abnormal Pap test in the past 10 years. If you have vaginal symptoms, such as bleeding or discharge, be sure to talk with your provider about a Pap test.      Every 1 to 2 years, have a mammogram.  If you are over 69, talk with your health care provider about whether or not you want to continue having screening mammograms.      Every 10 years, have a colonoscopy. Or, have a yearly FIT test (stool test). These exams will check for colon cancer.       Have a cholesterol test every 5 years, or more often if your doctor advises it.       Have a diabetes test (fasting glucose) every three years. If you are at risk for diabetes, you should have this test more often.       At age 65, have a bone density scan (DEXA) to check for osteoporosis (brittle bone disease).    Shots:    Get a flu shot each year.    Get a tetanus shot every 10 years.    Talk to your doctor about your pneumonia vaccines. There are now two you should receive - Pneumovax (PPSV 23)  and Prevnar (PCV 13).    Talk to your pharmacist about the shingles vaccine.    Talk to your doctor about the hepatitis B vaccine.    Nutrition:     Eat at least 5 servings of fruits and vegetables each day.      Eat whole-grain bread, whole-wheat pasta and brown rice instead of white grains and rice.      Get adequate Calcium and Vitamin D.     Lifestyle    Exercise at least 150 minutes a week (30 minutes a day, 5 days a week). This will help you control your weight and prevent disease.      Limit alcohol to one drink per day.      No smoking.       Wear sunscreen to prevent skin cancer.       See your dentist twice a year for an exam and cleaning.      See your eye doctor every 1 to 2 years to screen for conditions such as glaucoma, macular degeneration and cataracts.    Personalized Prevention Plan  You are due for the preventive services outlined below.  Your care team is available to assist you in scheduling these services.  If you have already completed any of these items, please share that information with your care team to update in your medical record.  Health Maintenance Due   Topic Date Due     Asthma Action Plan - yearly  04/23/1955     Discuss Advance Directive Planning  04/23/2005     Mammogram - every 2 years  01/01/2018     Asthma Control Test - every 3 months  02/06/2018     Flu Vaccine (1) 09/01/2018     FALL RISK ASSESSMENT  11/06/2018     Depression Assessment 2 - yearly  11/06/2018     Thyroid Function Lab (TSH) - yearly  12/04/2018     Basic Metabolic Lab - yearly  12/04/2018     Cholesterol Lab - yearly  12/04/2018             Follow-ups after your visit        Additional Services     GASTROENTEROLOGY ADULT REF PROCEDURE ONLY Other; [facility] [phone]       Last Lab Result: Creatinine (mg/dL)       Date                     Value                 12/04/2017               0.68             ----------  Body mass index is 23.36 kg/(m^2).      Patient will be contacted to schedule procedure.      Please be aware that coverage of these services is subject to the terms and limitations of your health insurance plan.  Call member services at your health plan with any benefit or coverage questions.  Any procedures must be performed at a [facility] [place] facility OR coordinated by your clinic's referral office.    Please bring the following with you to your appointment:    (1) Any X-Rays, CTs or MRIs which have been performed.  Contact the facility where they were done to arrange for  prior to your scheduled appointment.    (2) List of current medications   (3) This referral request   (4) Any documents/labs given to you for this referral                  Follow-up notes from your care team     Return in about 1 year (around 11/8/2019) for Physical Exam, Fasting Labs.      Your next 10 appointments already scheduled     Dec 31, 2018  4:30 PM CST   (Arrive by 4:15 PM)   New Patient Visit with Chiquis Panda MD   [facility] ([facility])    [address] [place] [address]  3rd [facility] [address] [phone]            Nov 08, 2019  8:30 AM CST   [facility]    [address] [facility] [id number] [phone]              Future tests that were ordered for you today     Open Future Orders        Priority Expected Expires Ordered    Echocardiogram Routine  11/8/2019 11/8/2018            Who to contact     If you have questions or need follow up information about today's clinic visit or your schedule please contact [facility] directly at [phone].  Normal or non-critical lab and imaging results will be communicated to you by MyChart, letter or phone within 4 business days after the clinic has received the results. If you do not hear from us within 7 days, please contact the clinic through MyChart or phone. If you have a critical or abnormal lab result, we will notify you  "by phone as soon as possible.  Submit refill requests through [employer] or call your pharmacy and they will forward the refill request to us. Please allow 3 business days for your refill to be completed.          Additional Information About Your Visit        [employer] Information     [employer] gives you secure access to your electronic health record. If you see a primary care provider, you can also send messages to your care team and make appointments. If you have questions, please call your primary care clinic.  If you do not have a primary care provider, please call [phone] and they will assist you.        Care EveryWhere ID     This is your Care EveryWhere ID. This could be used by other organizations to access your [facility] medical records  [id number]        Your Vitals Were     Pulse Temperature Height Pulse Oximetry BMI (Body Mass Index)       65 97.7  F (36.5  C) (Oral) 5' 3.5\" (1.613 m) 99% 23.36 kg/m2        Blood Pressure from Last 3 Encounters:   11/08/18 113/53   02/12/18 124/64   11/06/17 110/59    Weight from Last 3 Encounters:   11/08/18 134 lb (60.8 kg)   02/12/18 137 lb (62.1 kg)   11/06/17 136 lb (61.7 kg)              We Performed the Following     CBC with platelets     Comprehensive metabolic panel     GASTROENTEROLOGY ADULT REF PROCEDURE ONLY Other; [facility] [phone]     Lipid panel reflex to direct LDL Fasting     TSH WITH FREE T4 REFLEX          Today's Medication Changes          These changes are accurate as of 11/8/18  9:46 AM.  If you have any questions, ask your nurse or doctor.               Start taking these medicines.        Dose/Directions    albuterol 108 (90 Base) MCG/ACT inhaler   Commonly known as:  PROAIR HFA/PROVENTIL HFA/VENTOLIN HFA   Used for:  Mild intermittent asthma without complication   Started by:  Caprice Lares MD        Dose:  2 puff   Inhale 2 puffs into the lungs every 6 hours as needed for shortness of breath / dyspnea or wheezing   Quantity:  1 Inhaler "   Refills:  1         These medicines have changed or have updated prescriptions.        Dose/Directions    estradiol 0.0375 MG/24HR BIW patch   Commonly known as:  VIVELLE-DOT   This may have changed:  Another medication with the same name was removed. Continue taking this medication, and follow the directions you see here.   Used for:  Fibrocystic breast disease, unspecified laterality   Changed by:  Caprice Lares MD        Dose:  1 patch   Place 1 patch onto the skin twice a week   Quantity:  24 patch   Refills:  3            Where to get your medicines      These medications were sent to [facility]/pharmacy #[id number] - [place], [place] - [address]  [address] [facility] [address]     Phone:  [phone]     albuterol 108 (90 Base) MCG/ACT inhaler    estradiol 0.0375 MG/24HR BIW patch    levothyroxine 75 MCG tablet                Primary Care Provider Office Phone # Fax #    Caprice Lares [phone] [address]  [facility] [address]        Equal Access to Services     [facility]: Hadii alicia ku hadasho Soomaali, waaxda luqadaha, qaybta kaalmada adeegyada, waxay tanjain hayankita smith . So [facility] [phone].    ATENCIÓN: Si habla español, tiene a arcos disposición servicios gratuitos de asistencia lingüística. Llame al [phone].    We comply with applicable federal civil rights laws and [place] laws. We do not discriminate on the basis of race, color, national origin, age, disability, sex, sexual orientation, or gender identity.            Thank you!     Thank you for choosing [facility]  for your care. Our goal is always to provide you with excellent care. Hearing back from our patients is one way we can continue to improve our services. Please take a few minutes to complete the written survey that you may receive in the mail after your visit with us. Thank you!             Your Updated Medication List - Protect others around you: Learn how to safely use, store  and throw away your medicines at www.disposemymeds.org.          This list is accurate as of 11/8/18  9:46 AM.  Always use your most recent med list.                   Brand Name Dispense Instructions for use Diagnosis    4X PROBIOTIC Tabs     100 tablet    Take 1 each by mouth daily    Routine general medical examination at a health care facility       albuterol 108 (90 Base) MCG/ACT inhaler    PROAIR HFA/PROVENTIL HFA/VENTOLIN HFA    1 Inhaler    Inhale 2 puffs into the lungs every 6 hours as needed for shortness of breath / dyspnea or wheezing    Mild intermittent asthma without complication       cholecalciferol 1000 UNIT tablet    vitamin D3    30 tablet    Take 2.5 tablets (2,500 Units) by mouth daily        estradiol 0.0375 MG/24HR BIW patch    VIVELLE-DOT    24 patch    Place 1 patch onto the skin twice a week    Fibrocystic breast disease, unspecified laterality       fluticasone 50 MCG/ACT spray    FLONASE     Spray 1 spray into both nostrils daily        levothyroxine 75 MCG tablet    SYNTHROID/LEVOTHROID    90 tablet    Take 1 tablet (75 mcg) by mouth daily    Acquired hypothyroidism       multivitamin, therapeutic with minerals Tabs tablet     30 each    Take 1 tablet by mouth daily    Routine general medical examination at a health care facility       VITAMIN B 12 PO      Take 250 mcg by mouth every 7 days

## 2018-11-08 NOTE — PATIENT INSTRUCTIONS
Preventive Health Recommendations    See your health care provider every year to    Review health changes.     Discuss preventive care.      Review your medicines if your doctor has prescribed any.      You no longer need a yearly Pap test unless you've had an abnormal Pap test in the past 10 years. If you have vaginal symptoms, such as bleeding or discharge, be sure to talk with your provider about a Pap test.      Every 1 to 2 years, have a mammogram.  If you are over 69, talk with your health care provider about whether or not you want to continue having screening mammograms.      Every 10 years, have a colonoscopy. Or, have a yearly FIT test (stool test). These exams will check for colon cancer.       Have a cholesterol test every 5 years, or more often if your doctor advises it.       Have a diabetes test (fasting glucose) every three years. If you are at risk for diabetes, you should have this test more often.       At age 65, have a bone density scan (DEXA) to check for osteoporosis (brittle bone disease).    Shots:    Get a flu shot each year.    Get a tetanus shot every 10 years.    Talk to your doctor about your pneumonia vaccines. There are now two you should receive - Pneumovax (PPSV 23) and Prevnar (PCV 13).    Talk to your pharmacist about the shingles vaccine.    Talk to your doctor about the hepatitis B vaccine.    Nutrition:     Eat at least 5 servings of fruits and vegetables each day.      Eat whole-grain bread, whole-wheat pasta and brown rice instead of white grains and rice.      Get adequate Calcium and Vitamin D.     Lifestyle    Exercise at least 150 minutes a week (30 minutes a day, 5 days a week). This will help you control your weight and prevent disease.      Limit alcohol to one drink per day.      No smoking.       Wear sunscreen to prevent skin cancer.       See your dentist twice a year for an exam and cleaning.      See your eye doctor every 1 to 2 years to screen for conditions  such as glaucoma, macular degeneration and cataracts.    Personalized Prevention Plan  You are due for the preventive services outlined below.  Your care team is available to assist you in scheduling these services.  If you have already completed any of these items, please share that information with your care team to update in your medical record.  Health Maintenance Due   Topic Date Due     Asthma Action Plan - yearly  04/23/1955     Discuss Advance Directive Planning  04/23/2005     Mammogram - every 2 years  01/01/2018     Asthma Control Test - every 3 months  02/06/2018     Flu Vaccine (1) 09/01/2018     FALL RISK ASSESSMENT  11/06/2018     Depression Assessment 2 - yearly  11/06/2018     Thyroid Function Lab (TSH) - yearly  12/04/2018     Basic Metabolic Lab - yearly  12/04/2018     Cholesterol Lab - yearly  12/04/2018

## 2018-11-09 ASSESSMENT — ASTHMA QUESTIONNAIRES: ACT_TOTALSCORE: 25

## 2018-11-16 ENCOUNTER — MYC MEDICAL ADVICE (OUTPATIENT)
Dept: FAMILY MEDICINE | Facility: CLINIC | Age: 68
End: 2018-11-16

## 2018-11-16 NOTE — TELEPHONE ENCOUNTER
PCP,    Please see MyChart message below and advise if OK to see pt's daughter for a physical.    Thank you,  Jack COHEN RN

## 2018-11-19 ENCOUNTER — TELEPHONE (OUTPATIENT)
Dept: FAMILY MEDICINE | Facility: CLINIC | Age: 68
End: 2018-11-19

## 2018-11-19 ENCOUNTER — MYC MEDICAL ADVICE (OUTPATIENT)
Dept: FAMILY MEDICINE | Facility: CLINIC | Age: 68
End: 2018-11-19

## 2018-11-19 NOTE — TELEPHONE ENCOUNTER
Reason for Call:  Other     Detailed comments: REYNALDO PEREYRA is calling requesting the referral for the upper scope to be sent for Renea    Phone Number Patient can be reached at: [phone]  Fax: [phone]    Best Time: any    Can we leave a detailed message on this number? YES    Call taken on 11/19/2018 at 4:34 PM by Mariel Krueger

## 2018-11-20 NOTE — TELEPHONE ENCOUNTER
Notified [facility] gave verbal information for her to have a Upper GI. [facility] will call patient to schedule.    Amanda Puentes  Referral Coordinator

## 2018-11-30 ENCOUNTER — HOSPITAL ENCOUNTER (OUTPATIENT)
Dept: CARDIOLOGY | Facility: CLINIC | Age: 68
Discharge: HOME | End: 2018-11-30
Attending: INTERNAL MEDICINE | Admitting: INTERNAL MEDICINE
Payer: COMMERCIAL

## 2018-11-30 DIAGNOSIS — I71.20 THORACIC AORTIC ANEURYSM WITHOUT RUPTURE (H): ICD-10-CM

## 2018-11-30 PROCEDURE — 93306 TTE W/DOPPLER COMPLETE: CPT

## 2018-11-30 PROCEDURE — 93306 TTE W/DOPPLER COMPLETE: CPT | Mod: 26 | Performed by: INTERNAL MEDICINE

## 2018-12-07 ENCOUNTER — TRANSFERRED RECORDS (OUTPATIENT)
Dept: HEALTH INFORMATION MANAGEMENT | Facility: CLINIC | Age: 68
End: 2018-12-07

## 2018-12-31 ENCOUNTER — OFFICE VISIT (OUTPATIENT)
Dept: DERMATOLOGY | Facility: CLINIC | Age: 68
End: 2018-12-31
Payer: COMMERCIAL

## 2018-12-31 VITALS — SYSTOLIC BLOOD PRESSURE: 108 MMHG | DIASTOLIC BLOOD PRESSURE: 66 MMHG | HEART RATE: 76 BPM

## 2018-12-31 DIAGNOSIS — D48.5 NEOPLASM OF UNCERTAIN BEHAVIOR OF SKIN: Primary | ICD-10-CM

## 2018-12-31 DIAGNOSIS — Z12.83 SKIN EXAM FOR MALIGNANT NEOPLASM: ICD-10-CM

## 2018-12-31 DIAGNOSIS — Z85.828 HISTORY OF SKIN CANCER: ICD-10-CM

## 2018-12-31 DIAGNOSIS — D18.1 LYMPHANGIOMA CIRCUMSCRIPTUM: ICD-10-CM

## 2018-12-31 RX ORDER — LIDOCAINE HYDROCHLORIDE AND EPINEPHRINE 10; 10 MG/ML; UG/ML
1 INJECTION, SOLUTION INFILTRATION; PERINEURAL ONCE
Status: DISCONTINUED | OUTPATIENT
Start: 2018-12-31 | End: 2020-03-24

## 2018-12-31 ASSESSMENT — PAIN SCALES - GENERAL: PAINLEVEL: NO PAIN (0)

## 2018-12-31 NOTE — PATIENT INSTRUCTIONS

## 2018-12-31 NOTE — LETTER
12/31/2018       RE: eRnea Lerma  [id number] [address] [id number]     Dear Colleague,    Thank you for referring your patient, Renea Lerma, to the [facility] DERMATOLOGY at [facility]. Please see a copy of my visit note below.    [facility] Dermatology Note      Dermatology Problem List:  1. Hx of basal cell carcinoma  - hx of several BCC removals in 1600-3221  - several biopsies of various lesions since, all benign per pt    Encounter Date: Dec 31, 2018    CC:   Chief Complaint   Patient presents with     Skin Check     Renea is here for skin check.         History of Present Illness:  Ms. Renea Lerma is a 68 year old woman w/ PMHx of basal cell carcinoma who presents for whole body skin check. She presently has a new lesion on the L supraclavicular chest that has been around for the past 6 months. Seems to be getting larger. New tiny area of black pigmentation on lesion. Painful to touch. Tends to get irritated w/ putting on clothes. Non-bleeding. Has some other raised lesions that are not painful around it. Also has grouped raised lesions on R upper arm that have been present for 40 years. Previously biopsied and benign. She has no other skin concerns.     Pt has hx of basal cell carcinoma. Had several lesions removed in 8456-8781. Has had other skin spots crop up since, but all subsequent biopsies have been negative per her report.     Sometimes bites cheek and has buccal white spot by molars. Recently had mouth burn from hot beverage 5 days ago. No other mouth sores or ulcers. No rashes, dry patches, pruritus, lymphadenopathy, fevers, night sweats, chills, or unintended weight loss.     Past Medical History:   Patient Active Problem List   Diagnosis     Esophageal reflux     Mild intermittent asthma     Hypothyroidism     Fibrocystic breast disease, unspecified laterality     Nonrheumatic aortic valve insufficiency     Thoracic  aortic aneurysm without rupture (H)     Mixed hyperlipidemia     Shoulder arthritis     Vitamin D deficiency     Lung nodule     Dyspnea on exertion     Nutcracker esophagus     Past Medical History:   Diagnosis Date     Asthma      BCC (basal cell carcinoma of skin) 2010    rt shoulder     Endometrial ca (H)     stage 1     Family hx of colon cancer      GERD (gastroesophageal reflux disease)      Hormone replacement therapy      Hyperlipidemia LDL goal <130 11/4/2016     Hypothyroidism      Nonrheumatic aortic valve insufficiency 11/4/2016     Shoulder arthritis     rt     Thoracic aortic aneurysm without rupture (H)     4.3 cm     Vitamin D deficiency 11/4/2016     Past Surgical History:   Procedure Laterality Date     C VAG HYST,UTERUS >250 GMS,REM TUBE/OVARY  2000     COLONOSCOPY       COLONOSCOPY N/A 12/12/2016    Procedure: COLONOSCOPY;  Surgeon: Manjinder Vera MD;  Location:  GI     TONSILLECTOMY         Social History:   reports that  has never smoked. she has never used smokeless tobacco. She reports that she drinks alcohol. She reports that she does not use drugs.    Family History:  Family History   Problem Relation Age of Onset     Diabetes Father        Medications:  Current Outpatient Medications   Medication Sig Dispense Refill     albuterol (PROAIR HFA/PROVENTIL HFA/VENTOLIN HFA) 108 (90 Base) MCG/ACT inhaler Inhale 2 puffs into the lungs every 6 hours as needed for shortness of breath / dyspnea or wheezing 1 Inhaler 1     cholecalciferol (VITAMIN D3) 1000 UNIT tablet Take 2.5 tablets (2,500 Units) by mouth daily 30 tablet 11     Cyanocobalamin (VITAMIN B 12 PO) Take 250 mcg by mouth every 7 days       estradiol (VIVELLE-DOT) 0.0375 MG/24HR BIW patch Place 1 patch onto the skin twice a week 24 patch 3     fluticasone (FLONASE) 50 MCG/ACT spray Spray 1 spray into both nostrils daily       levothyroxine (SYNTHROID/LEVOTHROID) 75 MCG tablet Take 1 tablet (75 mcg) by mouth daily 90 tablet 3      multivitamin, therapeutic with minerals (MULTI-VITAMIN) TABS tablet Take 1 tablet by mouth daily 30 each 11     Probiotic Product (4X PROBIOTIC) TABS Take 1 each by mouth daily 100 tablet 1        Allergies   Allergen Reactions     No Known Drug Allergies      Seasonal Allergies      Adhesive Tape Rash         Review of Systems:  -As per HPI    Physical exam:  Vitals: /66 (BP Location: Right arm, Patient Position: Sitting, Cuff Size: Adult Regular)   Pulse 76   GEN: This is a well developed, well-nourished female in no acute distress, in a pleasant mood.    SKIN: Total skin excluding the undergarment areas was performed. The exam included the head/face, neck, both arms, chest, back, abdomen, both legs, digits and/or nails.   - small 3-5 mm rough, verrucous, hyperkeratotic hard papule w/ punctum of dark brown pigmentation on L supraclavicular skin; non-bleeding  - 2x small smooth tan sub-centimeter lesions, 1 on L neck and other on L supraclavicular skin   - R upper arm w/ grouped smooth, flesh-colored papules w/ scarring noted from prior biopsy at distal portion of lesions   - scattered lentigines, nevi, and seborrheic keratoses across back, rest of torso, arms, and legs  - no other lesions of concern on areas examined.     Impression/Plan:  1. Hx of basal cell carcinoma  Pt w/ hx of BCC that was resected in 2001 and 2002. No subsequent malignant biopsies. No present concerns.     Annual skin checks    Regular sunscreen and sun avoidance     PLACIDO filled out; attempt to get outside records and biopsy results     2. 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 Developed more of them in same area later in life. More proximal lesions sometimes change size but never drain. Has had prior biopsies of the area. Cannot recall what official diagnosis was, but lymphangioma circumspectum sounds about right to her.     Continue to monitor    Obtain prior biopsy results; PLACIDO signed     3. Inflamed  verrucous SK vs skin tag  Painful and irritating. Arose 6 mo ago.     Shave biopsy:  After discussion of benefits and risks including but not limited to bleeding/bruising, pain/swelling, infection, scar, incomplete removal, nerve damage/numbness, recurrence, and non-diagnostic biopsy, written consent, verbal consent and photographs were obtained. Time-out was performed. The area was cleaned with isopropyl alcohol. 1.0ml of 1% lidocaine with 1:100,000 epinephrine was injected to obtain adequate anesthesia. A shave biopsy was performed. Hemostasis was achieved with aluminium chloride. Vaseline and a sterile dressing were applied. The patient tolerated the procedure and no complications were noted. The patient was provided with verbal and written post care instructions.    Follow-up path results     CC Caprice Cooper on close of this encounter. Follow-up in 1 year, earlier for new or changing lesions.       Dr. Panda staffed the patient.    Staff Involved:  Resident(Pj Anderson)/Staff(as above)    Patient was seen and examined with the dermatology resident. I agree with the history, review of systems, physical examination, assessments and plan. The biopsy procedure and cryotherapy were done together.    Chiquis Panda MD  Professor and  Chair  Department of Dermatology  [facility]

## 2018-12-31 NOTE — NURSING NOTE
Lidocaine-epinephrine 1-1:635922 % injection   1 mL once for one use, starting 12/31/2018 ending 12/31/2018,  2mL disp, R-0, injection  Injected by Dr. Panda

## 2018-12-31 NOTE — NURSING NOTE
Dermatology Rooming Note    Renea Lerma's goals for this visit include:   Chief Complaint   Patient presents with     Skin Check     Renea is here for skin check.     Sharon Agosto, CMA

## 2018-12-31 NOTE — PROGRESS NOTES
[facility] Dermatology Note      Dermatology Problem List:  1. Hx of basal cell carcinoma  - hx of several BCC removals in 5351-2573  - several biopsies of various lesions since, all benign per pt    Encounter Date: Dec 31, 2018    CC:   Chief Complaint   Patient presents with     Skin Check     Renea is here for skin check.         History of Present Illness:  Ms. Renea Lerma is a 68 year old woman w/ PMHx of basal cell carcinoma who presents for whole body skin check. She presently has a new lesion on the L supraclavicular chest that has been around for the past 6 months. Seems to be getting larger. New tiny area of black pigmentation on lesion. Painful to touch. Tends to get irritated w/ putting on clothes. Non-bleeding. Has some other raised lesions that are not painful around it. Also has grouped raised lesions on R upper arm that have been present for 40 years. Previously biopsied and benign. She has no other skin concerns.     Pt has hx of basal cell carcinoma. Had several lesions removed in 8428-5172. Has had other skin spots crop up since, but all subsequent biopsies have been negative per her report.     Sometimes bites cheek and has buccal white spot by molars. Recently had mouth burn from hot beverage 5 days ago. No other mouth sores or ulcers. No rashes, dry patches, pruritus, lymphadenopathy, fevers, night sweats, chills, or unintended weight loss.     Past Medical History:   Patient Active Problem List   Diagnosis     Esophageal reflux     Mild intermittent asthma     Hypothyroidism     Fibrocystic breast disease, unspecified laterality     Nonrheumatic aortic valve insufficiency     Thoracic aortic aneurysm without rupture (H)     Mixed hyperlipidemia     Shoulder arthritis     Vitamin D deficiency     Lung nodule     Dyspnea on exertion     Nutcracker esophagus     Past Medical History:   Diagnosis Date     Asthma      BCC (basal cell carcinoma of skin) 2010    rt shoulder      Endometrial ca (H)     stage 1     Family hx of colon cancer      GERD (gastroesophageal reflux disease)      Hormone replacement therapy      Hyperlipidemia LDL goal <130 11/4/2016     Hypothyroidism      Nonrheumatic aortic valve insufficiency 11/4/2016     Shoulder arthritis     rt     Thoracic aortic aneurysm without rupture (H)     4.3 cm     Vitamin D deficiency 11/4/2016     Past Surgical History:   Procedure Laterality Date     C VAG HYST,UTERUS >250 GMS,REM TUBE/OVARY  2000     COLONOSCOPY       COLONOSCOPY N/A 12/12/2016    Procedure: COLONOSCOPY;  Surgeon: Manjinder Vera MD;  Location:  GI     TONSILLECTOMY         Social History:   reports that  has never smoked. she has never used smokeless tobacco. She reports that she drinks alcohol. She reports that she does not use drugs.    Family History:  Family History   Problem Relation Age of Onset     Diabetes Father        Medications:  Current Outpatient Medications   Medication Sig Dispense Refill     albuterol (PROAIR HFA/PROVENTIL HFA/VENTOLIN HFA) 108 (90 Base) MCG/ACT inhaler Inhale 2 puffs into the lungs every 6 hours as needed for shortness of breath / dyspnea or wheezing 1 Inhaler 1     cholecalciferol (VITAMIN D3) 1000 UNIT tablet Take 2.5 tablets (2,500 Units) by mouth daily 30 tablet 11     Cyanocobalamin (VITAMIN B 12 PO) Take 250 mcg by mouth every 7 days       estradiol (VIVELLE-DOT) 0.0375 MG/24HR BIW patch Place 1 patch onto the skin twice a week 24 patch 3     fluticasone (FLONASE) 50 MCG/ACT spray Spray 1 spray into both nostrils daily       levothyroxine (SYNTHROID/LEVOTHROID) 75 MCG tablet Take 1 tablet (75 mcg) by mouth daily 90 tablet 3     multivitamin, therapeutic with minerals (MULTI-VITAMIN) TABS tablet Take 1 tablet by mouth daily 30 each 11     Probiotic Product (4X PROBIOTIC) TABS Take 1 each by mouth daily 100 tablet 1        Allergies   Allergen Reactions     No Known Drug Allergies      Seasonal Allergies      Adhesive  Tape Rash         Review of Systems:  -As per HPI    Physical exam:  Vitals: /66 (BP Location: Right arm, Patient Position: Sitting, Cuff Size: Adult Regular)   Pulse 76   GEN: This is a well developed, well-nourished female in no acute distress, in a pleasant mood.    SKIN: Total skin excluding the undergarment areas was performed. The exam included the head/face, neck, both arms, chest, back, abdomen, both legs, digits and/or nails.   - small 3-5 mm rough, verrucous, hyperkeratotic hard papule w/ punctum of dark brown pigmentation on L supraclavicular skin; non-bleeding  - 2x small smooth tan sub-centimeter lesions, 1 on L neck and other on L supraclavicular skin   - R upper arm w/ grouped smooth, flesh-colored papules w/ scarring noted from prior biopsy at distal portion of lesions   - scattered lentigines, nevi, and seborrheic keratoses across back, rest of torso, arms, and legs  - no other lesions of concern on areas examined.     Impression/Plan:  1. Hx of basal cell carcinoma  Pt w/ hx of BCC that was resected in 2001 and 2002. No subsequent malignant biopsies. No present concerns.     Annual skin checks    Regular sunscreen and sun avoidance     PLACIDO filled out; attempt to get outside records and biopsy results     2. 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 Developed more of them in same area later in life. More proximal lesions sometimes change size but never drain. Has had prior biopsies of the area. Cannot recall what official diagnosis was, but lymphangioma circumspectum sounds about right to her.     Continue to monitor    Obtain prior biopsy results; PLACIDO signed     3. Inflamed verrucous SK vs skin tag  Painful and irritating. Arose 6 mo ago.     Shave biopsy:  After discussion of benefits and risks including but not limited to bleeding/bruising, pain/swelling, infection, scar, incomplete removal, nerve damage/numbness, recurrence, and non-diagnostic biopsy, written  consent, verbal consent and photographs were obtained. Time-out was performed. The area was cleaned with isopropyl alcohol. 1.0ml of 1% lidocaine with 1:100,000 epinephrine was injected to obtain adequate anesthesia. A shave biopsy was performed. Hemostasis was achieved with aluminium chloride. Vaseline and a sterile dressing were applied. The patient tolerated the procedure and no complications were noted. The patient was provided with verbal and written post care instructions.    Follow-up path results     CC Caprice Cooper on close of this encounter. Follow-up in 1 year, earlier for new or changing lesions.       Dr. Panda staffed the patient.    Staff Involved:  Resident(Pj Anderson)/Staff(as above)    Patient was seen and examined with the dermatology resident. I agree with the history, review of systems, physical examination, assessments and plan. The biopsy procedure and cryotherapy were done together.    Chiquis Panda MD  Professor and  Chair  Department of Dermatology  [facility]

## 2019-01-04 LAB — COPATH REPORT: NORMAL

## 2019-01-25 ENCOUNTER — TRANSFERRED RECORDS (OUTPATIENT)
Dept: HEALTH INFORMATION MANAGEMENT | Facility: CLINIC | Age: 69
End: 2019-01-25

## 2019-02-08 ENCOUNTER — DOCUMENTATION ONLY (OUTPATIENT)
Dept: DERMATOLOGY | Facility: CLINIC | Age: 69
End: 2019-02-08

## 2019-02-08 NOTE — PROGRESS NOTES
Spoke with records department at [facility]. They will fax over requested BBC and biopsy.

## 2019-02-27 ENCOUNTER — TRANSFERRED RECORDS (OUTPATIENT)
Dept: HEALTH INFORMATION MANAGEMENT | Facility: CLINIC | Age: 69
End: 2019-02-27

## 2019-03-22 ENCOUNTER — TRANSFERRED RECORDS (OUTPATIENT)
Dept: HEALTH INFORMATION MANAGEMENT | Facility: CLINIC | Age: 69
End: 2019-03-22

## 2019-03-25 NOTE — PROGRESS NOTES
Writer spoke with medical records department at [facility] who now states they are unable to find patient in their system. PLACIDO was previously faxed and again faxed Now.     Writer spoke with patient who states Dr Bey did her previous biopsy's but Not at [facility]. She does not recall where she had these done but it was back in the 90's. She will look at her records and see what she can find and contact the clinic back tomorrow.

## 2019-03-28 ENCOUNTER — OFFICE VISIT (OUTPATIENT)
Dept: URGENT CARE | Facility: URGENT CARE | Age: 69
End: 2019-03-28
Payer: COMMERCIAL

## 2019-03-28 ENCOUNTER — TELEPHONE (OUTPATIENT)
Dept: FAMILY MEDICINE | Facility: CLINIC | Age: 69
End: 2019-03-28

## 2019-03-28 VITALS — HEART RATE: 77 BPM | SYSTOLIC BLOOD PRESSURE: 96 MMHG | DIASTOLIC BLOOD PRESSURE: 54 MMHG

## 2019-03-28 DIAGNOSIS — M48.02 CERVICAL STENOSIS OF SPINAL CANAL: Primary | ICD-10-CM

## 2019-03-28 PROCEDURE — 99214 OFFICE O/P EST MOD 30 MIN: CPT | Performed by: FAMILY MEDICINE

## 2019-03-28 RX ORDER — GABAPENTIN 100 MG/1
100 CAPSULE ORAL
Qty: 30 CAPSULE | Refills: 0 | Status: SHIPPED | OUTPATIENT
Start: 2019-03-28 | End: 2019-11-11

## 2019-03-28 NOTE — TELEPHONE ENCOUNTER
"Pt reports pain that is radiating to both sides arms/shoulders/ occasionally back of neck, very painful at rest/feels sore/goes up and down arms since Friday.  Pt reports extra fatigue/unknown reason for past week.  Hard to tell if pt is actually having chest pain, as pt reported could be side effect of medication and states \"not really\" as having chest pain.    BP is well controlled historically  Denies ongoing CP, SOB, nausea, vomiting, difficulty breathing.    Advised pt to be seen tonight/pt requested OV tomorrow.  Discussed symptoms common to women/importance of immediate eval.  Pt agreed to at least come to  tonight, any worsening sx to ER.     Aziza James RN    "

## 2019-03-28 NOTE — TELEPHONE ENCOUNTER
Reason for call:  Patient reporting a symptom    Symptom or request: arm and hand pain     Duration (how long have symptoms been present):  4 days    Have you been treated for this before? No    Additional comments:  Pt has been complaining of hand and arm pain and is seeking treatment options. Please advise    Phone Number patient can be reached at:  Home number on file [phone] (home)    Best Time:  any    Can we leave a detailed message on this number:  YES    Call taken on 3/28/2019 at 4:17 PM by Yann Deutsch

## 2019-03-29 NOTE — TELEPHONE ENCOUNTER
PCP     Is there a time you would be available for a phone visit with patient to discuss medications (see below notes pt recently seen at ER)?     Please advise     Jessica JULIO RN

## 2019-03-29 NOTE — TELEPHONE ENCOUNTER
Reason for Call:  Other call back    Detailed comments: Pt was in to urgent care 3/28/19, wants to have phone vist with  about all the new medications the Urgent care DR prescribed before she starts taking them.   If we could please give her a call back.    Phone Number Patient can be reached at: Cell number on file:    Telephone Information:   Mobile [phone]       Best Time: any    Can we leave a detailed message on this number? YES    Call taken on 3/29/2019 at 11:38 AM by Joan Landry

## 2019-03-29 NOTE — PATIENT INSTRUCTIONS
Patient instruction:     You were seen at the urgent care for evaluation and treatment. You were diagnosed with the following below:     Renea was seen today for pain and sinus problem.    Diagnoses and all orders for this visit:    Cervical stenosis of spinal canal  -     gabapentin (NEURONTIN) 100 MG capsule; Take 1 capsule (100 mg) by mouth nightly as needed  -     gabapentin 8 % GEL topical PLO cream; Apply less than 0.2 grams on the neck QID prn  -     MR Cervical Spine w/o Contrast; Future  -     diclofenac (VOLTAREN) 1 % topical gel; Place onto the skin 4 times daily  -     [facility], [facility]RA[facility] REFERRAL; Future      - Please take any prescribed medication as directed.  - Please follow up with your primary care physician next week   - Please return back to the clinic or go to your nearest Emergency Department if you develop worsening or new symptoms such as: Persistent fever, chills, headaches, blurry vision, chest pain, shortness of breath, abdominal pain, nausea, vomiting, and diarrhea.    Clay Valerio. MD

## 2019-03-29 NOTE — TELEPHONE ENCOUNTER
Routing to [facility] to please reach out to patient and help schedule phone visit with Dr Lares for Tuesday 4-2-19.     Thank you,  Amina JUSTICE RN,BSN

## 2019-03-29 NOTE — PROGRESS NOTES
SUBJECTIVE:   Chief Complaint   Patient presents with     Pain     Arms,Hands, fingers tingling and neck pain (4 days)     Sinus Problem     facial pain, jaw pain     Patient  is a 68 year old female who presented to urgent care clinic for evaluation of upper arm pain and numbness.     Joint/Muscle Pain      Onset: Four days ago    Injury?  no    Description:   Location(s): Both upper extremities and fingers   Character: Sharp pain on upper extremities. Fingers have numbness and tingling sensation     Intensity: mild    Progression of Symptoms: same    Accompanying Signs & Symptoms:  Other symptoms: none    History:   Previous similar pain: Yes Details: Several years ago. However symptoms resolved       Worsened by    Overuse?: no  Morning Stiffness?:no    Alleviating factors:  Improved by: nothing  Therapies Tried and outcome: Nothing    Review of Systems review of system negative except as mentioned in HPI.       Past Medical History:   Diagnosis Date     Asthma      BCC (basal cell carcinoma of skin) 2010    rt shoulder     Endometrial ca (H)     stage 1     Family hx of colon cancer      GERD (gastroesophageal reflux disease)      Hormone replacement therapy      Hyperlipidemia LDL goal <130 11/4/2016     Hypothyroidism      Nonrheumatic aortic valve insufficiency 11/4/2016     Shoulder arthritis     rt     Thoracic aortic aneurysm without rupture (H)     4.3 cm     Vitamin D deficiency 11/4/2016     Family History   Problem Relation Age of Onset     Diabetes Father      Current Outpatient Medications   Medication Sig Dispense Refill     estradiol (VIVELLE-DOT) 0.0375 MG/24HR BIW patch Place 1 patch onto the skin twice a week 24 patch 3     levothyroxine (SYNTHROID/LEVOTHROID) 75 MCG tablet Take 1 tablet (75 mcg) by mouth daily 90 tablet 3     multivitamin, therapeutic with minerals (MULTI-VITAMIN) TABS tablet Take 1 tablet by mouth daily 30 each 11     albuterol (PROAIR HFA/PROVENTIL HFA/VENTOLIN HFA) 108 (90  Base) MCG/ACT inhaler Inhale 2 puffs into the lungs every 6 hours as needed for shortness of breath / dyspnea or wheezing 1 Inhaler 1     cholecalciferol (VITAMIN D3) 1000 UNIT tablet Take 2.5 tablets (2,500 Units) by mouth daily 30 tablet 11     Cyanocobalamin (VITAMIN B 12 PO) Take 250 mcg by mouth every 7 days       fluticasone (FLONASE) 50 MCG/ACT spray Spray 1 spray into both nostrils daily       Probiotic Product (4X PROBIOTIC) TABS Take 1 each by mouth daily 100 tablet 1     Social History     Tobacco Use     Smoking status: Never Smoker     Smokeless tobacco: Never Used   Substance Use Topics     Alcohol use: Yes     Comment: 1 glass wine per week       OBJECTIVE  BP 96/54   Pulse 77     Physical Exam   Constitutional: She appears well-developed and well-nourished.   HENT:   Head: Normocephalic and atraumatic.   Musculoskeletal: Normal range of motion. She exhibits no edema.        Right elbow: Normal.       Right wrist: Normal.        Right hand: Normal.        Left hand: Normal.   Skin: She is not diaphoretic.       Labs:  No results found for this or any previous visit (from the past 24 hour(s)).    X-Ray was not done.    ASSESSMENT:    Renea was seen today for pain and sinus problem.    Diagnoses and all orders for this visit:    Cervical stenosis of spinal canal:   This is a 68-year-old female who presented to the clinic with bilateral upper extremity radiculopathy pain.  Her physical examination was consistent with normal strength and sensation of both upper extremities.  Neck exam was also unremarkable for cervical spine tenderness.  Possible differentials includes: Cervical stenosis vs disc herniation vs impingement syndrome vs peripheral neuropathy.  Chart review (care everywhere) revealed an MRI of the cervical spine in 2055 consistent with C4-C6 disc herniation, spinal stenosis, and degenerative disease.  Advised patient that her symptoms could be secondary to cervical spine stenosis secondary to  arthritis.  Repeated MRI was recommended and she will follow-up with her primary care physician after imaging.  As needed gabapentin, topical Voltaren gel, and physical therapy was also recommended today.  She will follow-up as needed.  -     gabapentin (NEURONTIN) 100 MG capsule; Take 1 capsule (100 mg) by mouth nightly as needed  -     gabapentin 8 % GEL topical PLO cream; Apply less than 0.2 grams on the neck QID prn  -     MR Cervical Spine w/o Contrast; Future  -     diclofenac (VOLTAREN) 1 % topical gel; Place onto the skin 4 times daily  -     [facility]ERR[facility]; Future    Clay Flores MD

## 2019-04-02 ENCOUNTER — HOSPITAL ENCOUNTER (OUTPATIENT)
Dept: MRI IMAGING | Facility: CLINIC | Age: 69
Discharge: HOME | End: 2019-04-02
Attending: FAMILY MEDICINE | Admitting: FAMILY MEDICINE
Payer: COMMERCIAL

## 2019-04-02 DIAGNOSIS — M48.02 CERVICAL STENOSIS OF SPINAL CANAL: ICD-10-CM

## 2019-04-02 PROCEDURE — 72141 MRI NECK SPINE W/O DYE: CPT

## 2019-04-18 ENCOUNTER — THERAPY VISIT (OUTPATIENT)
Dept: PHYSICAL THERAPY | Facility: CLINIC | Age: 69
End: 2019-04-18
Payer: COMMERCIAL

## 2019-04-18 DIAGNOSIS — M54.2 CERVICAL PAIN: ICD-10-CM

## 2019-04-18 DIAGNOSIS — M54.12 CERVICAL RADICULOPATHY: ICD-10-CM

## 2019-04-18 PROCEDURE — 97110 THERAPEUTIC EXERCISES: CPT | Mod: GP | Performed by: PHYSICAL THERAPIST

## 2019-04-18 PROCEDURE — 97161 PT EVAL LOW COMPLEX 20 MIN: CPT | Mod: GP | Performed by: PHYSICAL THERAPIST

## 2019-04-18 NOTE — PROGRESS NOTES
[facility] Initial Evaluation  Subjective:    Renea Lerma is a 68 year old female with a cervical spine condition.  Condition occurred with:  Insidious onset.  Condition occurred: for unknown reasons.  This is a chronic and recurrent condition  Chronic neck pain for over a year.  Felt intense pain down B arms on 3/24/2019 that was unrelenting.  Patient complains of pain in B UT, goes down B shoulders and tingling in B hands.  Complains of achiness in B thumbs.  Patient sees massage therapist monthly and reports gets temporary relief from treatment.  Pt works in [employer] and demands are sitting and driving.  .    Patient reports pain:  Central cervical spine and upper thoracic.  Radiates to:  Shoulder left, shoulder right, lower arm right, lower arm left, hand right and hand left.  Pain is described as aching and sharp and is constant and reported as 5/10.  Associated symptoms:  Loss of motion/stiffness and tingling. Pain is worse in the P.M..  Symptoms are exacerbated by rotating head, looking up or down and driving and relieved by rest and heat (epsom bath).  Since onset symptoms are unchanged.  Special tests:  MRI (DDD, stenosis).      General health as reported by patient is good.                                              Objective:        Flexibility/Screens:     Upper Extremity:    Decreased left upper extremity flexibility at:  Pectoralis Major; Pectoralis Minor; Wrist Flexors and Wrist Extensors    Decreased right upper extremity flexibility present at:  Pectoralis Major; Pectoralis Minor; Wrist Flexors and Wrist Extensors                      Cervical/Thoracic Evaluation    Headaches: cervical  Cervical Myotomes:  normal                  DTR's:  normal                    Cord Sign:  normal                                            Andrae Cervical Evaluation    Posture:  Sitting: poor    Protruding Head: yes  Wry Neck: no  Correction of Posture: better    Movement  Loss:    Flexion (Flex): mod  Retraction (RET): major  Extension (EXT): major      Rotation Right (ROT R): mod  Rotation Left (ROT L): mod  Test Movements:      RET: During: produces  After: better  Mechanical Response: IncROM  Repeat RET: During: produces  After: better  Mechanical Response: IncROM  RET EXT: During: produces  After: better  Mechanical Response: IncROM  Repeat RET EXT: During: produces  After: better  Mechanical Response: IncROM                        Conclusion: derangement  Principle of Treatment:  Posture Correction: education    Extension: responded well    Other: scap stab and B UE strength program                                         ROS    Assessment/Plan:    Patient is a 68 year old female with cervical complaints.    Patient has the following significant findings with corresponding treatment plan.                Diagnosis 1:  Neck pain  Pain -  manual therapy, self management, education, directional preference exercise and home program  Decreased ROM/flexibility - manual therapy, therapeutic exercise, therapeutic activity   Decreased joint mobility - manual therapy, therapeutic exercise, therapeutic activity and home program  Decreased strength - therapeutic exercise, therapeutic activities and home program  Decreased function - therapeutic activities and home program  Impaired posture - neuro re-education, therapeutic activities and home program    Therapy Evaluation Codes:   1) History comprised of:   Personal factors that impact the plan of care:      Time since onset of symptoms.    Comorbidity factors that impact the plan of care are:      Asthma, Heart problems and Numbness/tingling.     Medications impacting care: None.  2) Examination of Body Systems comprised of:   Body structures and functions that impact the plan of care:      Cervical spine and Shoulder.   Activity limitations that impact the plan of care are:      Driving and Sitting.  3) Clinical presentation  characteristics are:   Stable/Uncomplicated.  4) Decision-Making    Low complexity using standardized patient assessment instrument and/or measureable assessment of functional outcome.  Cumulative Therapy Evaluation is: Low complexity.    Previous and current functional limitations:  (See Goal Flow Sheet for this information)    Short term and Long term goals: (See Goal Flow Sheet for this information)     Communication ability:  Patient appears to be able to clearly communicate and understand verbal and written communication and follow directions correctly.  Treatment Explanation - The following has been discussed with the patient:   RX ordered/plan of care  Anticipated outcomes  Possible risks and side effects  This patient would benefit from PT intervention to resume normal activities.   Rehab potential is good.    Frequency:  1 X week, once daily  Duration:  for 8 weeks  Discharge Plan:  Achieve all LTG.  Independent in home treatment program.  Reach maximal therapeutic benefit.    Please refer to the daily flowsheet for treatment today, total treatment time and time spent performing 1:1 timed codes.

## 2019-04-19 PROBLEM — M54.2 CERVICAL PAIN: Status: ACTIVE | Noted: 2019-04-19

## 2019-04-19 NOTE — PROGRESS NOTES
[facility] Initial Evaluation  Subjective:                                     General health as reported by patient is good.  Pertinent medical history includes:  Asthma, heart problems, numbness/tingling and thyroid problems.  Medical allergies: yes (Adhesive).  Other surgeries include:  Other (Hysterectomy).  Current medications:  Thyroid medication and hormone replacement therapy.  Current occupation is [employer].    Primary job tasks include:  Other, driving and lifting (Computer Work).                                Objective:  System    Physical Exam    General     ROS    Assessment/Plan:

## 2019-04-25 ENCOUNTER — THERAPY VISIT (OUTPATIENT)
Dept: PHYSICAL THERAPY | Facility: CLINIC | Age: 69
End: 2019-04-25
Payer: COMMERCIAL

## 2019-04-25 DIAGNOSIS — M54.2 CERVICAL PAIN: ICD-10-CM

## 2019-04-25 DIAGNOSIS — M48.02 CERVICAL STENOSIS OF SPINAL CANAL: ICD-10-CM

## 2019-04-25 PROCEDURE — 97110 THERAPEUTIC EXERCISES: CPT | Mod: GP | Performed by: PHYSICAL THERAPIST

## 2019-04-25 PROCEDURE — 97140 MANUAL THERAPY 1/> REGIONS: CPT | Mod: GP | Performed by: PHYSICAL THERAPIST

## 2019-04-25 NOTE — TELEPHONE ENCOUNTER

## 2019-05-02 ENCOUNTER — THERAPY VISIT (OUTPATIENT)
Dept: PHYSICAL THERAPY | Facility: CLINIC | Age: 69
End: 2019-05-02
Payer: COMMERCIAL

## 2019-05-02 DIAGNOSIS — M54.2 CERVICAL PAIN: ICD-10-CM

## 2019-05-02 PROCEDURE — 97140 MANUAL THERAPY 1/> REGIONS: CPT | Mod: GP | Performed by: PHYSICAL THERAPIST

## 2019-05-02 PROCEDURE — 97530 THERAPEUTIC ACTIVITIES: CPT | Mod: GP | Performed by: PHYSICAL THERAPIST

## 2019-05-02 PROCEDURE — 97110 THERAPEUTIC EXERCISES: CPT | Mod: GP | Performed by: PHYSICAL THERAPIST

## 2019-05-16 ENCOUNTER — THERAPY VISIT (OUTPATIENT)
Dept: PHYSICAL THERAPY | Facility: CLINIC | Age: 69
End: 2019-05-16
Payer: COMMERCIAL

## 2019-05-16 DIAGNOSIS — M54.2 CERVICAL PAIN: ICD-10-CM

## 2019-05-16 PROCEDURE — 97110 THERAPEUTIC EXERCISES: CPT | Mod: GP | Performed by: PHYSICAL THERAPIST

## 2019-05-16 PROCEDURE — 97140 MANUAL THERAPY 1/> REGIONS: CPT | Mod: GP | Performed by: PHYSICAL THERAPIST

## 2019-06-13 ENCOUNTER — THERAPY VISIT (OUTPATIENT)
Dept: PHYSICAL THERAPY | Facility: CLINIC | Age: 69
End: 2019-06-13
Payer: COMMERCIAL

## 2019-06-13 DIAGNOSIS — M54.2 CERVICAL PAIN: ICD-10-CM

## 2019-06-13 PROCEDURE — 97140 MANUAL THERAPY 1/> REGIONS: CPT | Mod: GP | Performed by: PHYSICAL THERAPIST

## 2019-06-13 NOTE — PROGRESS NOTES
DISCHARGE REPORT    Progress reporting period is from 4/18/2019 to 6/13/2019.       SUBJECTIVE  Subjective changes noted by patient:  .  Subjective: pt was on a road trip and felt pretty good. Denies pain, felt really good.  Exercises going well.  She feels she is able to manage on her own.  She will start to try to take breaks midweek from work to see if she can manage her pain as feels her pain from stress of driving in traffic and work can increase her symptoms.    Current pain level is  Current Pain level: 6/10.     Previous pain level was   Initial Pain level: 5/10.   Changes in function:  Yes (See Goal flowsheet attached for changes in current functional level)  Adverse reaction to treatment or activity: None    OBJECTIVE  Changes noted in objective findings:    Objective: tender B UT and suboccipitals.  good understanding of HEP.  Pt continues to have forward shoulders, forward head.  able to correct with cues.     ASSESSMENT/PLAN  Updated problem list and treatment plan: Diagnosis 1:  Neck pain/cervical stenosis    STG/LTGs have been met or progress has been made towards goals:  Yes (See Goal flow sheet completed today.)  Assessment of Progress: The patient's condition is improving.  Pt has met all goals  Self Management Plans:  Patient is independent in a home treatment program.  Patient is independent in self management of symptoms.  I have re-evaluated this patient and find that the nature, scope, duration and intensity of the therapy is appropriate for the medical condition of the patient.  Renea continues to require the following intervention to meet STG and LTG's:  PT intervention is no longer required to meet STG/LTG.    Recommendations:  This patient is ready to be discharged from therapy and continue their home treatment program.    Please refer to the daily flowsheet for treatment today, total treatment time and time spent performing 1:1 timed codes.

## 2019-10-22 ENCOUNTER — TELEPHONE (OUTPATIENT)
Dept: FAMILY MEDICINE | Facility: CLINIC | Age: 69
End: 2019-10-22

## 2019-10-22 DIAGNOSIS — E03.9 ACQUIRED HYPOTHYROIDISM: ICD-10-CM

## 2019-10-22 DIAGNOSIS — K22.2 SCHATZKI'S RING OF DISTAL ESOPHAGUS: Primary | ICD-10-CM

## 2019-10-22 DIAGNOSIS — E78.2 MIXED HYPERLIPIDEMIA: ICD-10-CM

## 2019-10-22 NOTE — TELEPHONE ENCOUNTER
Dr. Lares,  Pt is schedule for a physical on 11/11 and would like to   Have her labs drawn on 11/05. Please place orders. Patient  Stated that her insurance company considers her thyroid issue  A pre-existing condition and will not pay for her physical if she has  Her thyroid tested at the same time as her physical labs.    Gloria

## 2019-11-03 ENCOUNTER — HEALTH MAINTENANCE LETTER (OUTPATIENT)
Age: 69
End: 2019-11-03

## 2019-11-04 ENCOUNTER — TRANSFERRED RECORDS (OUTPATIENT)
Dept: HEALTH INFORMATION MANAGEMENT | Facility: CLINIC | Age: 69
End: 2019-11-04

## 2019-11-04 DIAGNOSIS — E03.9 ACQUIRED HYPOTHYROIDISM: ICD-10-CM

## 2019-11-04 DIAGNOSIS — K22.2 SCHATZKI'S RING OF DISTAL ESOPHAGUS: ICD-10-CM

## 2019-11-04 DIAGNOSIS — E78.2 MIXED HYPERLIPIDEMIA: ICD-10-CM

## 2019-11-04 LAB
ALBUMIN SERPL-MCNC: 3.8 G/DL (ref 3.4–5)
ALP SERPL-CCNC: 67 U/L (ref 40–150)
ALT SERPL W P-5'-P-CCNC: 23 U/L (ref 0–50)
ANION GAP SERPL CALCULATED.3IONS-SCNC: 7 MMOL/L (ref 3–14)
AST SERPL W P-5'-P-CCNC: 21 U/L (ref 0–45)
BASOPHILS # BLD AUTO: 0 10E9/L (ref 0–0.2)
BASOPHILS NFR BLD AUTO: 0.4 %
BILIRUB SERPL-MCNC: 0.6 MG/DL (ref 0.2–1.3)
BUN SERPL-MCNC: 20 MG/DL (ref 7–30)
CALCIUM SERPL-MCNC: 9.1 MG/DL (ref 8.5–10.1)
CHLORIDE SERPL-SCNC: 107 MMOL/L (ref 94–109)
CHOLEST SERPL-MCNC: 241 MG/DL
CO2 SERPL-SCNC: 25 MMOL/L (ref 20–32)
CREAT SERPL-MCNC: 0.67 MG/DL (ref 0.52–1.04)
DIFFERENTIAL METHOD BLD: NORMAL
EOSINOPHIL # BLD AUTO: 0.2 10E9/L (ref 0–0.7)
EOSINOPHIL NFR BLD AUTO: 4.4 %
ERYTHROCYTE [DISTWIDTH] IN BLOOD BY AUTOMATED COUNT: 13.1 % (ref 10–15)
GFR SERPL CREATININE-BSD FRML MDRD: 89 ML/MIN/{1.73_M2}
GLUCOSE SERPL-MCNC: 87 MG/DL (ref 70–99)
HCT VFR BLD AUTO: 37 % (ref 35–47)
HDLC SERPL-MCNC: 83 MG/DL
HGB BLD-MCNC: 12.7 G/DL (ref 11.7–15.7)
LDLC SERPL CALC-MCNC: 140 MG/DL
LYMPHOCYTES # BLD AUTO: 1.3 10E9/L (ref 0.8–5.3)
LYMPHOCYTES NFR BLD AUTO: 27.8 %
MCH RBC QN AUTO: 31.3 PG (ref 26.5–33)
MCHC RBC AUTO-ENTMCNC: 34.3 G/DL (ref 31.5–36.5)
MCV RBC AUTO: 91 FL (ref 78–100)
MONOCYTES # BLD AUTO: 0.3 10E9/L (ref 0–1.3)
MONOCYTES NFR BLD AUTO: 6.7 %
NEUTROPHILS # BLD AUTO: 2.9 10E9/L (ref 1.6–8.3)
NEUTROPHILS NFR BLD AUTO: 60.7 %
NONHDLC SERPL-MCNC: 158 MG/DL
PLATELET # BLD AUTO: 241 10E9/L (ref 150–450)
POTASSIUM SERPL-SCNC: 4.4 MMOL/L (ref 3.4–5.3)
PROT SERPL-MCNC: 6.9 G/DL (ref 6.8–8.8)
RBC # BLD AUTO: 4.06 10E12/L (ref 3.8–5.2)
SODIUM SERPL-SCNC: 139 MMOL/L (ref 133–144)
TRIGL SERPL-MCNC: 91 MG/DL
WBC # BLD AUTO: 4.8 10E9/L (ref 4–11)

## 2019-11-04 PROCEDURE — 80061 LIPID PANEL: CPT | Performed by: INTERNAL MEDICINE

## 2019-11-04 PROCEDURE — 36415 COLL VENOUS BLD VENIPUNCTURE: CPT | Performed by: INTERNAL MEDICINE

## 2019-11-04 PROCEDURE — 80050 GENERAL HEALTH PANEL: CPT | Performed by: INTERNAL MEDICINE

## 2019-11-11 ENCOUNTER — OFFICE VISIT (OUTPATIENT)
Dept: FAMILY MEDICINE | Facility: CLINIC | Age: 69
End: 2019-11-11
Payer: COMMERCIAL

## 2019-11-11 VITALS
DIASTOLIC BLOOD PRESSURE: 58 MMHG | SYSTOLIC BLOOD PRESSURE: 103 MMHG | HEART RATE: 69 BPM | WEIGHT: 139 LBS | TEMPERATURE: 97.4 F | BODY MASS INDEX: 24.63 KG/M2 | OXYGEN SATURATION: 100 % | HEIGHT: 63 IN

## 2019-11-11 DIAGNOSIS — Z00.00 ENCOUNTER FOR MEDICARE ANNUAL WELLNESS EXAM: ICD-10-CM

## 2019-11-11 DIAGNOSIS — E78.5 HYPERLIPIDEMIA LDL GOAL <100: ICD-10-CM

## 2019-11-11 DIAGNOSIS — R06.00 DYSPNEA, UNSPECIFIED TYPE: ICD-10-CM

## 2019-11-11 DIAGNOSIS — Z00.00 ROUTINE GENERAL MEDICAL EXAMINATION AT A HEALTH CARE FACILITY: Primary | ICD-10-CM

## 2019-11-11 DIAGNOSIS — Z79.890 HORMONE REPLACEMENT THERAPY: ICD-10-CM

## 2019-11-11 DIAGNOSIS — E55.9 VITAMIN D DEFICIENCY: ICD-10-CM

## 2019-11-11 DIAGNOSIS — K22.2 SCHATZKI'S RING OF DISTAL ESOPHAGUS: ICD-10-CM

## 2019-11-11 DIAGNOSIS — R07.89 CHEST DISCOMFORT: ICD-10-CM

## 2019-11-11 DIAGNOSIS — N60.19 FIBROCYSTIC BREAST DISEASE, UNSPECIFIED LATERALITY: ICD-10-CM

## 2019-11-11 DIAGNOSIS — E03.9 ACQUIRED HYPOTHYROIDISM: ICD-10-CM

## 2019-11-11 DIAGNOSIS — Z12.31 ENCOUNTER FOR SCREENING MAMMOGRAM FOR BREAST CANCER: ICD-10-CM

## 2019-11-11 DIAGNOSIS — J45.20 MILD INTERMITTENT ASTHMA WITHOUT COMPLICATION: ICD-10-CM

## 2019-11-11 DIAGNOSIS — I71.20 THORACIC AORTIC ANEURYSM WITHOUT RUPTURE (H): ICD-10-CM

## 2019-11-11 PROCEDURE — 99213 OFFICE O/P EST LOW 20 MIN: CPT | Mod: 25 | Performed by: INTERNAL MEDICINE

## 2019-11-11 PROCEDURE — 99397 PER PM REEVAL EST PAT 65+ YR: CPT | Performed by: INTERNAL MEDICINE

## 2019-11-11 RX ORDER — LEVOTHYROXINE SODIUM 75 UG/1
75 TABLET ORAL DAILY
Qty: 90 TABLET | Refills: 3 | Status: SHIPPED | OUTPATIENT
Start: 2019-11-11 | End: 2020-11-10

## 2019-11-11 RX ORDER — ESTRADIOL 0.04 MG/D
1 PATCH, EXTENDED RELEASE TRANSDERMAL
Qty: 24 PATCH | Refills: 3 | Status: SHIPPED | OUTPATIENT
Start: 2019-11-11 | End: 2020-11-16

## 2019-11-11 ASSESSMENT — ACTIVITIES OF DAILY LIVING (ADL): CURRENT_FUNCTION: NO ASSISTANCE NEEDED

## 2019-11-11 ASSESSMENT — MIFFLIN-ST. JEOR: SCORE: 1129.39

## 2019-11-11 NOTE — PATIENT INSTRUCTIONS
Breo 1 spray a day    The shingrix is our newer vaccine and is recommended to healthy adults over 55. It's administered in 2 separate doses. You can just walk into the pharmacy and get it when convenient for you. However, it's on a national shortage currently so we recommend calling the pharmacy a head of time to verify they have it in stock ([facility] and many other pharmacies carry this). Generally speaking the pharmacy is covered better by insurance than the clinic.  Please check with your insurance to make sure what is covered/what your copay would be.     Here is a link from the CDC on Shingles Vaccines, where they provide information on this new Shingrix vaccine.   https://www.cdc.gov/shingles/vaccination.html     Patient Education   Personalized Prevention Plan  You are due for the preventive services outlined below.  Your care team is available to assist you in scheduling these services.  If you have already completed any of these items, please share that information with your care team to update in your medical record.  Health Maintenance Due   Topic Date Due     Asthma Action Plan - yearly  1950     Pneumococcal Vaccine (2 of 2 - PPSV23) 10/01/2016     Zoster (Shingles) Vaccine (2 of 3) 12/30/2016     Asthma Control Test  02/08/2019     Flu Vaccine (1) 09/01/2019     FALL RISK ASSESSMENT  11/08/2019     Annual Wellness Visit  11/08/2019     Thyroid Function Lab  11/08/2019       Urinary Incontinence, Female (Adult)  Urinary incontinence means loss of control of the bladder. This problem affects many women, especially as they get older. If you have incontinence, you may be embarrassed to ask for help. But know that this problem can be treated.  Types of Incontinence  There are different types of incontinence. Two of the main types are described here. You can have more than one type.    Stress incontinence. With this type, urine leaks when pressure (stress) is put on the bladder. This may  happen when you cough, sneeze, or laugh. Stress incontinence most often occurs because the pelvic floor muscles that support the bladder and urethra are weak. This can happen after pregnancy and vaginal childbirth or a hysterectomy. It can also be due to excess body weight or hormone changes.    Urge incontinence (also called overactive bladder). With this type, a sudden urge to urinate is felt often. This may happen even though there may not be much urine in the bladder. The need to urinate often during the night is common. Urge incontinence most often occurs because of bladder spasms. This may be due to bladder irritation or infection. Damage to bladder nerves or pelvic muscles, constipation, and certain medicines can also lead to urge incontinence.  Treatment of urinary incontinence depends on the cause. Further evaluation is needed to find the type you have. This will likely include an exam and certain tests. Based on the results, you and your healthcare provider can then plan treatment. Until a diagnosis is made, the home care tips below can help relieve symptoms.  Home care    Do pelvic floor muscle exercises, if they are prescribed. The pelvic floor muscles help support the bladder and urethra. Many women find that their symptoms improve when doing special exercises that strengthen these muscles. To do the exercises contract the muscles you would use to stop your stream of urine, but do this when you re not urinating. Hold for 10 seconds, then relax. Repeat 10 to 20 times in a row, at least 3 times a day. Your provider may give you other instructions for how to do the exercises and how often.    Keep a bladder diary. This helps track how often and how much you urinate over a set period of time. Bring this diary with you to your next visit with the provider. The information can help your provider learn more about your bladder problem.    Lose weight, if advised to by your provider. Excess weight puts pressure  on the bladder. Your provider can help you create a weight-loss plan that s right for you. This may include exercising more and making certain diet changes.    Don't consume foods and drinks that may irritate the bladder. These can include alcohol and caffeinated drinks.    Quit smoking. Smoking and other tobacco use can lead to chronic cough that strains the pelvic floor muscles. Smoking may also damage the bladder and urethra. Talk with your provider about treatments or methods you can use to quit smoking.    If drinking large amounts of fluid causes you to have symptoms, you may be advised to limit your fluid intake. You may also be advised to drink most of your fluids during the day and to limit fluids at night.    If you re worried about urine leakage or accidents, you may wear absorbent pads to catch urine. Change the pads often. This helps reduce discomfort. It may also reduce the risk of skin or bladder infections.  Follow-up care  Follow up with your healthcare provider, or as directed. It may take some to find the right treatment for your problem. Your treatment plan may include special therapies or medicines. Certain procedures or surgery may also be options. Be sure to discuss any questions you have with your provider.  When to seek medical advice  Call the healthcare provider right away if any of these occur:    Fever of 100.4 F (38 C) or higher, or as directed by your provider    Bladder pain or fullness    Abdominal swelling    Nausea or vomiting    Back pain    Weakness, dizziness or fainting  Date Last Reviewed: 10/1/2017    0439-2975 [employer]. [address] [place] [address], [place] [address]. All rights reserved. This information is not intended as a substitute for professional medical care. Always follow your healthcare professional's instructions.

## 2019-11-11 NOTE — PROGRESS NOTES
"SUBJECTIVE:   Renea Lerma is a 69 year old female who presents for Preventive Visit.  Patient has  ongoing dyspnea  She also has chest discomfort  Also has wheezing  She has no angina  And can do zomba class without any symptoms   Are you in the first 12 months of your Medicare coverage?  No    Healthy Habits:     In general, how would you rate your overall health?  Good    Frequency of exercise:  2-3 days/week    Duration of exercise:  15-30 minutes    Do you usually eat at least 4 servings of fruit and vegetables a day, include whole grains    & fiber and avoid regularly eating high fat or \"junk\" foods?  Yes    Taking medications regularly:  Yes    Barriers to taking medications:  None    Ability to successfully perform activities of daily living:  No assistance needed    Home Safety:  No safety concerns identified    Hearing Impairment:  No hearing concerns    In the past 6 months, have you been bothered by leaking of urine? Yes    In general, how would you rate your overall mental or emotional health?  Excellent      PHQ-2 Total Score: 0    Additional concerns today:  No    Do you feel safe in your environment? Yes    Have you ever done Advance Care Planning? (For example, a Health Directive, POLST, or a discussion with a medical provider or your loved ones about your wishes): Yes, patient states has an Advance Care Planning document and will bring a copy to the clinic.      Fall risk  Fallen 2 or more times in the past year?: No  Any fall with injury in the past year?: No    Cognitive Screening   1) Repeat 3 items (Leader, Season, Table)    2) Clock draw: NORMAL  3) 3 item recall: Recalls 3 objects  Results: 3 items recalled: COGNITIVE IMPAIRMENT LESS LIKELY    Mini-CogTM Copyright STEPHANIE Hamlin. Licensed by the author for use in [facility]; reprinted with permission (malik@.[facility]). All rights reserved.      Do you have sleep apnea, excessive snoring or daytime drowsiness?: no    Reviewed and " updated as needed this visit by clinical staff  Tobacco  Allergies  Meds  Problems  Med Hx  Surg Hx  Fam Hx         Reviewed and updated as needed this visit by Provider  Tobacco  Allergies  Meds  Problems  Med Hx  Surg Hx  Fam Hx        Social History     Tobacco Use     Smoking status: Never Smoker     Smokeless tobacco: Never Used   Substance Use Topics     Alcohol use: Yes     Comment: 1 glass of wine per week     If you drink alcohol do you typically have >3 drinks per day or >7 drinks per week? No    Alcohol Use 11/11/2019   Prescreen: >3 drinks/day or >7 drinks/week? No   Prescreen: >3 drinks/day or >7 drinks/week? -             Current providers sharing in care for this patient include:   Patient Care Team:  Caprice Lares MD as PCP - General (Internal Medicine)  Caprice Lares MD as Assigned PCP  Chiquis Panda MD as MD (Dermatology)  Zayda Hussein MD as MD (Dermatology)    The following health maintenance items are reviewed in Epic and correct as of today:  Health Maintenance   Topic Date Due     ASTHMA ACTION PLAN  1950     PNEUMOCOCCAL IMMUNIZATION 65+ LOW/MEDIUM RISK (2 of 2 - PPSV23) 10/01/2016     ZOSTER IMMUNIZATION (2 of 3) 12/30/2016     ASTHMA CONTROL TEST  02/08/2019     INFLUENZA VACCINE (1) 09/01/2019     FALL RISK ASSESSMENT  11/08/2019     TSH W/FREE T4 REFLEX  11/08/2019     DTAP/TDAP/TD IMMUNIZATION (2 - Td) 03/22/2020     MAMMO SCREENING  11/01/2020     BMP  11/04/2020     LIPID  11/04/2020     MEDICARE ANNUAL WELLNESS VISIT  11/11/2020     ADVANCE CARE PLANNING  11/11/2024     COLONOSCOPY  12/12/2026     DEXA  Completed     HEPATITIS C SCREENING  Completed     PHQ-2  Completed     IPV IMMUNIZATION  Aged Out     MENINGITIS IMMUNIZATION  Aged Out     Labs reviewed in EPIC  BP Readings from Last 3 Encounters:   11/11/19 103/58   03/28/19 96/54   12/31/18 108/66    Wt Readings from Last 3 Encounters:   11/11/19 63 kg (139 lb)   11/08/18 60.8 kg (134  lb)   02/12/18 62.1 kg (137 lb)                  Patient Active Problem List   Diagnosis     Esophageal reflux     Mild intermittent asthma     Hypothyroidism     Fibrocystic breast disease, unspecified laterality     Nonrheumatic aortic valve insufficiency     Thoracic aortic aneurysm without rupture (H)     Mixed hyperlipidemia     Shoulder arthritis     Vitamin D deficiency     Lung nodule     Dyspnea on exertion     Nutcracker esophagus     Schatzki's ring of distal esophagus     Past Surgical History:   Procedure Laterality Date     BIOPSY       C VAG HYST,UTERUS >250 GMS,REM TUBE/OVARY  2000     COLONOSCOPY       COLONOSCOPY N/A 12/12/2016    Procedure: COLONOSCOPY;  Surgeon: Manjinder Vera MD;  Location:  GI     TONSILLECTOMY         Social History     Tobacco Use     Smoking status: Never Smoker     Smokeless tobacco: Never Used   Substance Use Topics     Alcohol use: Yes     Comment: 1 glass of wine per week     Family History   Problem Relation Age of Onset     Diabetes Father          Current Outpatient Medications   Medication Sig Dispense Refill     albuterol (PROAIR HFA/PROVENTIL HFA/VENTOLIN HFA) 108 (90 Base) MCG/ACT inhaler Inhale 2 puffs into the lungs every 6 hours as needed for shortness of breath / dyspnea or wheezing 1 Inhaler 1     cholecalciferol (VITAMIN D3) 1000 UNIT tablet Take 2.5 tablets (2,500 Units) by mouth daily 30 tablet 11     Cyanocobalamin (VITAMIN B 12 PO) Take 250 mcg by mouth every 7 days       estradiol (VIVELLE-DOT) 0.0375 MG/24HR BIW patch Place 1 patch onto the skin twice a week 24 patch 3     fluticasone (FLONASE) 50 MCG/ACT spray Spray 1 spray into both nostrils daily       fluticasone-vilanterol (BREO ELLIPTA) 200-25 MCG/INH inhaler Inhale 1 puff into the lungs daily 1 Inhaler 3     DIOGENES-Mag Cl-Taurine-Theanine (CALM) SOLN        gabapentin 8 % GEL topical PLO cream Apply less than 0.2 grams on the neck QID prn 100 g 0     levothyroxine (SYNTHROID/LEVOTHROID) 75 MCG  "tablet Take 1 tablet (75 mcg) by mouth daily 90 tablet 3     multivitamin, therapeutic with minerals (MULTI-VITAMIN) TABS tablet Take 1 tablet by mouth daily 30 each 11     Allergies   Allergen Reactions     Dust Mites      Mold      No Known Drug Allergies      Seasonal Allergies      Adhesive Tape Rash         Review of Systems  10 point ROS of systems including Constitutional, Eyes, Respiratory, Cardiovascular, Gastroenterology, Genitourinary, Integumentary, Muscularskeletal, Psychiatric were all negative except for pertinent positives noted in my HPI.    OBJECTIVE:   /58 (BP Location: Left arm, Patient Position: Sitting, Cuff Size: Adult Regular)   Pulse 69   Temp 97.4  F (36.3  C) (Oral)   Ht 1.608 m (5' 3.3\")   Wt 63 kg (139 lb)   SpO2 100%   BMI 24.39 kg/m   Estimated body mass index is 24.39 kg/m  as calculated from the following:    Height as of this encounter: 1.608 m (5' 3.3\").    Weight as of this encounter: 63 kg (139 lb).  Physical Exam  GENERAL APPEARANCE: healthy, alert and no distress  EYES: Eyes grossly normal to inspection, PERRL and conjunctivae and sclerae normal  HENT: ear canals and TM's normal, nose and mouth without ulcers or lesions, oropharynx clear and oral mucous membranes moist  NECK: no adenopathy, no asymmetry, masses, or scars and thyroid normal to palpation  RESP: lungs clear to auscultation - no rales, rhonchi or wheezes  BREAST: normal without masses, tenderness or nipple discharge and no palpable axillary masses or adenopathy  CV: regular rate and rhythm, normal S1 S2, no S3 or S4, no murmur, click or rub, no peripheral edema and peripheral pulses strong  ABDOMEN: soft, nontender, no hepatosplenomegaly, no masses and bowel sounds normal  MS: no musculoskeletal defects are noted and gait is age appropriate without ataxia  SKIN: no suspicious lesions or rashes  NEURO: Normal strength and tone, sensory exam grossly normal, mentation intact and speech normal  PSYCH: " mentation appears normal and affect normal/bright    Orders Only on 11/04/2019   Component Date Value Ref Range Status     Sodium 11/04/2019 139  133 - 144 mmol/L Final     Potassium 11/04/2019 4.4  3.4 - 5.3 mmol/L Final     Chloride 11/04/2019 107  94 - 109 mmol/L Final     Carbon Dioxide 11/04/2019 25  20 - 32 mmol/L Final     Anion Gap 11/04/2019 7  3 - 14 mmol/L Final     Glucose 11/04/2019 87  70 - 99 mg/dL Final    Fasting specimen     Urea Nitrogen 11/04/2019 20  7 - 30 mg/dL Final     Creatinine 11/04/2019 0.67  0.52 - 1.04 mg/dL Final     GFR Estimate 11/04/2019 89  >60 mL/min/[1.73_m2] Final    Comment: Non  GFR Calc  Starting 12/18/2018, serum creatinine based estimated GFR (eGFR) will be   calculated using the Chronic Kidney Disease Epidemiology Collaboration   (CKD-EPI) equation.       GFR Estimate If Black 11/04/2019 >90  >60 mL/min/[1.73_m2] Final    Comment:  GFR Calc  Starting 12/18/2018, serum creatinine based estimated GFR (eGFR) will be   calculated using the Chronic Kidney Disease Epidemiology Collaboration   (CKD-EPI) equation.       Calcium 11/04/2019 9.1  8.5 - 10.1 mg/dL Final     Bilirubin Total 11/04/2019 0.6  0.2 - 1.3 mg/dL Final     Albumin 11/04/2019 3.8  3.4 - 5.0 g/dL Final     Protein Total 11/04/2019 6.9  6.8 - 8.8 g/dL Final     Alkaline Phosphatase 11/04/2019 67  40 - 150 U/L Final     ALT 11/04/2019 23  0 - 50 U/L Final     AST 11/04/2019 21  0 - 45 U/L Final     Cholesterol 11/04/2019 241* <200 mg/dL Final    Desirable:       <200 mg/dl     Triglycerides 11/04/2019 91  <150 mg/dL Final    Fasting specimen     HDL Cholesterol 11/04/2019 83  >49 mg/dL Final     LDL Cholesterol Calculated 11/04/2019 140* <100 mg/dL Final    Comment: Above desirable:  100-129 mg/dl  Borderline High:  130-159 mg/dL  High:             160-189 mg/dL  Very high:       >189 mg/dl       Non HDL Cholesterol 11/04/2019 158* <130 mg/dL Final    Comment: Above Desirable:   130-159 mg/dl  Borderline high:  160-189 mg/dl  High:             190-219 mg/dl  Very high:       >219 mg/dl       WBC 11/04/2019 4.8  4.0 - 11.0 10e9/L Final     RBC Count 11/04/2019 4.06  3.8 - 5.2 10e12/L Final     Hemoglobin 11/04/2019 12.7  11.7 - 15.7 g/dL Final     Hematocrit 11/04/2019 37.0  35.0 - 47.0 % Final     MCV 11/04/2019 91  78 - 100 fl Final     MCH 11/04/2019 31.3  26.5 - 33.0 pg Final     MCHC 11/04/2019 34.3  31.5 - 36.5 g/dL Final     RDW 11/04/2019 13.1  10.0 - 15.0 % Final     Platelet Count 11/04/2019 241  150 - 450 10e9/L Final     % Neutrophils 11/04/2019 60.7  % Final     % Lymphocytes 11/04/2019 27.8  % Final     % Monocytes 11/04/2019 6.7  % Final     % Eosinophils 11/04/2019 4.4  % Final     % Basophils 11/04/2019 0.4  % Final     Absolute Neutrophil 11/04/2019 2.9  1.6 - 8.3 10e9/L Final     Absolute Lymphocytes 11/04/2019 1.3  0.8 - 5.3 10e9/L Final     Absolute Monocytes 11/04/2019 0.3  0.0 - 1.3 10e9/L Final     Absolute Eosinophils 11/04/2019 0.2  0.0 - 0.7 10e9/L Final     Absolute Basophils 11/04/2019 0.0  0.0 - 0.2 10e9/L Final     Diff Method 11/04/2019 Automated Method   Final     COMPARISON: 4/8/2011     FINDINGS:   Lumbar spine: The T-score is 0.5 from L1 to L4. Previously it was 1.1.     Hips: The left hip femoral neck T-score is -0.4. Previously it was  0.0. The right hip femoral neck T-score is -0.5. Previously it was  0.2. Bone mineral density in the worst hip is 0.966 gm/cm2.         ASSESSMENT / PLAN:   Renea was seen today for physical.    Diagnoses and all orders for this visit:    Routine general medical examination at a health care facility  Mammogram done normal   Dexa done in 2017 normal  uptodate on colon exam  Flu shot today  Will take shingrix   Thoracic aortic aneurysm without rupture (H)  -     Echocardiogram Complete; Future  - 4.3 cm on MRI  Will need annual echocardiogram or imaging    Schatzki's ring of distal esophagus    Will need surgery   Will see  "GI svc     Acquired hypothyroidism  -     TSH with free T4 reflex; Future  -     levothyroxine (SYNTHROID/LEVOTHROID) 75 MCG tablet; Take 1 tablet (75 mcg) by mouth daily  Continue same dose  Mild intermittent asthma without complication  -     fluticasone-vilanterol (BREO ELLIPTA) 200-25 MCG/INH inhaler; Inhale 1 puff into the lungs daily  -  Will try  Rinse throat after use      Dyspnea, unspecified type  -     fluticasone-vilanterol (BREO ELLIPTA) 200-25 MCG/INH inhaler; Inhale 1 puff into the lungs daily  -     CARDIOLOGY EVAL ADULT REFERRAL  -   If persists, will do spirometry and also, see cardiology    Chest discomfort  -     CARDIOLOGY EVAL ADULT REFERRAL  -   As above  Seems to be GI issue  MRI angio done 2018 is normal     Hormone replacement therapy  Continue this  Fibrocystic breast disease, unspecified laterality  -     estradiol (VIVELLE-DOT) 0.0375 MG/24HR BIW patch; Place 1 patch onto the skin twice a week  Will need annual mammogram   Hyperlipidemia LDL goal <100  -     Lipid panel reflex to direct LDL Fasting; Future  High CRP  Will Repeat lipid in  Feb since was on vacation   May need statins again which she wants to decline for now  Encounter for screening mammogram for breast cancer  -     MA Screening Digital Bilateral; Future    Vitamin D deficiency  -     Vitamin D Deficiency; Future        COUNSELING:  To see GI  Also, to see cardiology if Breo does not help  Diet, exercise , eye appointment       Estimated body mass index is 24.39 kg/m  as calculated from the following:    Height as of this encounter: 1.608 m (5' 3.3\").    Weight as of this encounter: 63 kg (139 lb).         reports that she has never smoked. She has never used smokeless tobacco.      Appropriate preventive services were discussed with this patient, including applicable screening as appropriate for cardiovascular disease, diabetes, osteopenia/osteoporosis, and glaucoma.  As appropriate for age/gender, discussed screening " for colorectal cancer, , breast cancer, and cervical cancer. Checklist reviewing preventive services available has been given to the patient.    Reviewed patients plan of care and provided an AVS. The Basic Care Plan (routine screening as documented in Health Maintenance) for Renea meets the Care Plan requirement. This Care Plan has been established and reviewed with the Patient.    Counseling Resources:  ATP IV Guidelines  Pooled Cohorts Equation Calculator  Breast Cancer Risk Calculator  FRAX Risk Assessment  ICSI Preventive Guidelines  Dietary Guidelines for Americans, 2010  USDA's MyPlate  ASA Prophylaxis  Lung CA Screening    Caprice Lares MD  [facility]    Identified Health Risks:    Information on urinary incontinence and treatment options given to patient.

## 2019-11-12 ASSESSMENT — ASTHMA QUESTIONNAIRES: ACT_TOTALSCORE: 24

## 2019-11-13 ENCOUNTER — OFFICE VISIT (OUTPATIENT)
Dept: DERMATOLOGY | Facility: CLINIC | Age: 69
End: 2019-11-13
Payer: COMMERCIAL

## 2019-11-13 VITALS — SYSTOLIC BLOOD PRESSURE: 111 MMHG | HEART RATE: 69 BPM | DIASTOLIC BLOOD PRESSURE: 57 MMHG

## 2019-11-13 DIAGNOSIS — L64.9 ANDROGENETIC ALOPECIA: ICD-10-CM

## 2019-11-13 DIAGNOSIS — L82.1 SEBORRHEIC KERATOSIS: Primary | ICD-10-CM

## 2019-11-13 DIAGNOSIS — Z85.828 HISTORY OF NONMELANOMA SKIN CANCER: ICD-10-CM

## 2019-11-13 DIAGNOSIS — L91.8 ACROCHORDON: ICD-10-CM

## 2019-11-13 DIAGNOSIS — L81.4 SOLAR LENTIGO: ICD-10-CM

## 2019-11-13 ASSESSMENT — PAIN SCALES - GENERAL: PAINLEVEL: NO PAIN (0)

## 2019-11-13 NOTE — LETTER
"11/13/2019       RE: Renea Lerma  [address] [address] [facility] [address]     Dear Colleague,    Thank you for referring your patient, Renea Lerma, to the [facility] DERMATOLOGY at [facility]. Please see a copy of my visit note below.    [facility] Dermatology Note      Dermatology Problem List:  1. Hx of basal cell carcinoma  - hx of several BCC removals in [id number]  - several biopsies of various lesions since, all benign per pt  2. Nevus lipomatosus superficialis   3. Androgenetic alopecia   - Current Rx: minoxidil 5% foam     Encounter Date: Nov 13, 2019    CC:   Chief Complaint   Patient presents with     Skin Check     skin check - Renea states, \"I have a lot of spots and I can never tell what they mean\". no family hx of melanoma     History of Present Illness:  Ms. Renea Lerma is a 69 year old woman w/ PMHx of basal cell carcinoma who presents for whole body skin check. She says she gets a lot of spots on her body now that she's getting older. Her BCC's were removed many years ago. She is doing well with her sun protection these days. She feels otherwise well but notes her arm is quite sore after her flu shot and has a tender area with some redness over the top after her shot. She sees her ob/gyn regularly after a hysterectomy for atypical uterine cells so declined an external genital exam today.     Past Medical History:   Patient Active Problem List   Diagnosis     Esophageal reflux     Mild intermittent asthma     Hypothyroidism     Fibrocystic breast disease, unspecified laterality     Nonrheumatic aortic valve insufficiency     Thoracic aortic aneurysm without rupture (H)     Mixed hyperlipidemia     Shoulder arthritis     Vitamin D deficiency     Lung nodule     Dyspnea on exertion     Nutcracker esophagus     Schatzki's ring of distal esophagus     Past Medical History:   Diagnosis Date     Asthma      BCC (basal cell carcinoma of " skin) 2010    rt shoulder     Endometrial ca (H)     stage 1     Family hx of colon cancer      GERD (gastroesophageal reflux disease)      Hormone replacement therapy      Hyperlipidemia LDL goal <130 11/4/2016     Hypothyroidism      Nonrheumatic aortic valve insufficiency 11/4/2016     Nutcracker esophagus      Schatzki's ring of distal esophagus      Shoulder arthritis     rt     Thoracic aortic aneurysm without rupture (H)     4.3 cm     Vitamin D deficiency 11/4/2016     Past Surgical History:   Procedure Laterality Date     BIOPSY       C VAG HYST,UTERUS >250 GMS,REM TUBE/OVARY  2000     COLONOSCOPY       COLONOSCOPY N/A 12/12/2016    Procedure: COLONOSCOPY;  Surgeon: Manjinder Vera MD;  Location:  GI     TONSILLECTOMY         Social History:   reports that she has never smoked. She has never used smokeless tobacco. She reports current alcohol use. She reports that she does not use drugs.    Family History:  Family History   Problem Relation Age of Onset     Diabetes Father        Medications:  Current Outpatient Medications   Medication Sig Dispense Refill     albuterol (PROAIR HFA/PROVENTIL HFA/VENTOLIN HFA) 108 (90 Base) MCG/ACT inhaler Inhale 2 puffs into the lungs every 6 hours as needed for shortness of breath / dyspnea or wheezing 1 Inhaler 1     cholecalciferol (VITAMIN D3) 1000 UNIT tablet Take 2.5 tablets (2,500 Units) by mouth daily 30 tablet 11     Cyanocobalamin (VITAMIN B 12 PO) Take 250 mcg by mouth every 7 days       estradiol (VIVELLE-DOT) 0.0375 MG/24HR BIW patch Place 1 patch onto the skin twice a week 24 patch 3     fluticasone (FLONASE) 50 MCG/ACT spray Spray 1 spray into both nostrils daily       fluticasone-vilanterol (BREO ELLIPTA) 200-25 MCG/INH inhaler Inhale 1 puff into the lungs daily 1 Inhaler 3     DIOGENES-Mag Cl-Taurine-Theanine (CALM) SOLN        gabapentin 8 % GEL topical PLO cream Apply less than 0.2 grams on the neck QID prn 100 g 0     levothyroxine (SYNTHROID/LEVOTHROID)  "75 MCG tablet Take 1 tablet (75 mcg) by mouth daily 90 tablet 3     multivitamin, therapeutic with minerals (MULTI-VITAMIN) TABS tablet Take 1 tablet by mouth daily 30 each 11        Allergies   Allergen Reactions     Dust Mites      Mold      No Known Drug Allergies      Seasonal Allergies      Adhesive Tape Rash         Review of Systems:  -As per HPI    Physical exam:  Vitals: /57 (BP Location: Right arm, Patient Position: Sitting, Cuff Size: Adult Regular)   Pulse 69   GEN: This is a well developed, well-nourished female in no acute distress, in a pleasant mood.    SKIN: Total skin excluding the undergarment areas was performed. The exam included the head/face, neck, both arms, chest, back, abdomen, both legs, digits and/or nails.   - small 3-5 mm rough, verrucous, hyperkeratotic hard papule w/ punctum of dark brown pigmentation on L supraclavicular skin; non-bleeding  - 2x small smooth tan sub-centimeter lesions, 1 on L neck and other on L supraclavicular skin   - R upper arm w/ grouped smooth, flesh-colored papules with several linear scars noted from prior biopsies  - scattered lentigines, nevi, and seborrheic keratoses across back, rest of torso, arms, and legs  - no other lesions of concern on areas examined.     Impression/Plan:  1. Hx of basal cell carcinoma  Pt w/ hx of BCC that was resected in 2001 and 2002. No subsequent malignant biopsies. No evidence of recurrent disease.     Annual skin checks     Encouraged good sun protective practices      2. 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 Developed more of them in same area later in life. Has had prior biopsies of the area; all benign per her report. Cannot recall what official diagnosis was, but said it started with an \"L\". Benign appearance today.      Continue to monitor     3. Seborrheic keratosis  4. Solar lentigos  5. Acrochordons    Reassured of benign etiology of the above lesions  "     5. Androgenetic alopecia     Discussed etiology. Patient wondering if minoxidil is worth it. Discussed midoxidil pros and cons and if she desires we recommend 5% foam w/ regular use; Discussed that results may not be apparent for 6 months.       CC Caprice Cooper on close of this encounter. Follow-up in 1 year, earlier for new or changing lesions.     Dr. Hussein staffed the patient.    Staff Involved:  Resident(Manjinder)/Staff    Maryellen Dunbar MD/MPH  Internal Medicine/Dermatology  PGY-3  [phone]      I, Zayda Hussein MD, saw this patient with the resident and agree with the resident s findings and plan of care as documented in the resident s note.    Again, thank you for allowing me to participate in the care of your patient.      Sincerely,    Zayda Hussein MD

## 2019-11-13 NOTE — PROGRESS NOTES
"[facility] Dermatology Note      Dermatology Problem List:  1. Hx of basal cell carcinoma  - hx of several BCC removals in [id number]  - several biopsies of various lesions since, all benign per pt  2. Nevus lipomatosus superficialis   3. Androgenetic alopecia   - Current Rx: minoxidil 5% foam     Encounter Date: Nov 13, 2019    CC:   Chief Complaint   Patient presents with     Skin Check     skin check - Renea states, \"I have a lot of spots and I can never tell what they mean\". no family hx of melanoma     History of Present Illness:  Ms. Renea Lerma is a 69 year old woman w/ PMHx of basal cell carcinoma who presents for whole body skin check. She says she gets a lot of spots on her body now that she's getting older. Her BCC's were removed many years ago. She is doing well with her sun protection these days. She feels otherwise well but notes her arm is quite sore after her flu shot and has a tender area with some redness over the top after her shot. She sees her ob/gyn regularly after a hysterectomy for atypical uterine cells so declined an external genital exam today.     Past Medical History:   Patient Active Problem List   Diagnosis     Esophageal reflux     Mild intermittent asthma     Hypothyroidism     Fibrocystic breast disease, unspecified laterality     Nonrheumatic aortic valve insufficiency     Thoracic aortic aneurysm without rupture (H)     Mixed hyperlipidemia     Shoulder arthritis     Vitamin D deficiency     Lung nodule     Dyspnea on exertion     Nutcracker esophagus     Schatzki's ring of distal esophagus     Past Medical History:   Diagnosis Date     Asthma      BCC (basal cell carcinoma of skin) 2010    rt shoulder     Endometrial ca (H)     stage 1     Family hx of colon cancer      GERD (gastroesophageal reflux disease)      Hormone replacement therapy      Hyperlipidemia LDL goal <130 11/4/2016     Hypothyroidism      Nonrheumatic aortic valve insufficiency 11/4/2016 "     Nutcracker esophagus      Schatzki's ring of distal esophagus      Shoulder arthritis     rt     Thoracic aortic aneurysm without rupture (H)     4.3 cm     Vitamin D deficiency 11/4/2016     Past Surgical History:   Procedure Laterality Date     BIOPSY       C VAG HYST,UTERUS >250 GMS,REM TUBE/OVARY  2000     COLONOSCOPY       COLONOSCOPY N/A 12/12/2016    Procedure: COLONOSCOPY;  Surgeon: Manjinder Vera MD;  Location:  GI     TONSILLECTOMY         Social History:   reports that she has never smoked. She has never used smokeless tobacco. She reports current alcohol use. She reports that she does not use drugs.    Family History:  Family History   Problem Relation Age of Onset     Diabetes Father        Medications:  Current Outpatient Medications   Medication Sig Dispense Refill     albuterol (PROAIR HFA/PROVENTIL HFA/VENTOLIN HFA) 108 (90 Base) MCG/ACT inhaler Inhale 2 puffs into the lungs every 6 hours as needed for shortness of breath / dyspnea or wheezing 1 Inhaler 1     cholecalciferol (VITAMIN D3) 1000 UNIT tablet Take 2.5 tablets (2,500 Units) by mouth daily 30 tablet 11     Cyanocobalamin (VITAMIN B 12 PO) Take 250 mcg by mouth every 7 days       estradiol (VIVELLE-DOT) 0.0375 MG/24HR BIW patch Place 1 patch onto the skin twice a week 24 patch 3     fluticasone (FLONASE) 50 MCG/ACT spray Spray 1 spray into both nostrils daily       fluticasone-vilanterol (BREO ELLIPTA) 200-25 MCG/INH inhaler Inhale 1 puff into the lungs daily 1 Inhaler 3     DIOGENES-Mag Cl-Taurine-Theanine (CALM) SOLN        gabapentin 8 % GEL topical PLO cream Apply less than 0.2 grams on the neck QID prn 100 g 0     levothyroxine (SYNTHROID/LEVOTHROID) 75 MCG tablet Take 1 tablet (75 mcg) by mouth daily 90 tablet 3     multivitamin, therapeutic with minerals (MULTI-VITAMIN) TABS tablet Take 1 tablet by mouth daily 30 each 11        Allergies   Allergen Reactions     Dust Mites      Mold      No Known Drug Allergies      Seasonal  "Allergies      Adhesive Tape Rash         Review of Systems:  -As per HPI    Physical exam:  Vitals: /57 (BP Location: Right arm, Patient Position: Sitting, Cuff Size: Adult Regular)   Pulse 69   GEN: This is a well developed, well-nourished female in no acute distress, in a pleasant mood.    SKIN: Total skin excluding the undergarment areas was performed. The exam included the head/face, neck, both arms, chest, back, abdomen, both legs, digits and/or nails.   - small 3-5 mm rough, verrucous, hyperkeratotic hard papule w/ punctum of dark brown pigmentation on L supraclavicular skin; non-bleeding  - 2x small smooth tan sub-centimeter lesions, 1 on L neck and other on L supraclavicular skin   - R upper arm w/ grouped smooth, flesh-colored papules with several linear scars noted from prior biopsies  - scattered lentigines, nevi, and seborrheic keratoses across back, rest of torso, arms, and legs  - no other lesions of concern on areas examined.     Impression/Plan:  1. Hx of basal cell carcinoma  Pt w/ hx of BCC that was resected in 2001 and 2002. No subsequent malignant biopsies. No evidence of recurrent disease.     Annual skin checks     Encouraged good sun protective practices      2. 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 Developed more of them in same area later in life. Has had prior biopsies of the area; all benign per her report. Cannot recall what official diagnosis was, but said it started with an \"L\". Benign appearance today.      Continue to monitor     3. Seborrheic keratosis  4. Solar lentigos  5. Acrochordons    Reassured of benign etiology of the above lesions      5. Androgenetic alopecia     Discussed etiology. Patient wondering if minoxidil is worth it. Discussed midoxidil pros and cons and if she desires we recommend 5% foam w/ regular use; Discussed that results may not be apparent for 6 months.       CC Caprice Cooper on close of " this encounter. Follow-up in 1 year, earlier for new or changing lesions.     Dr. Hussein staffed the patient.    Staff Involved:  Resident(Manjinder)/Staff    Maryellen Dunbar MD/MPH  Internal Medicine/Dermatology  PGY-3  [phone]      I, Zayda Hussein MD, saw this patient with the resident and agree with the resident s findings and plan of care as documented in the resident s note.

## 2019-11-14 DIAGNOSIS — E78.5 HYPERLIPIDEMIA LDL GOAL <100: ICD-10-CM

## 2019-11-14 DIAGNOSIS — E55.9 VITAMIN D DEFICIENCY: ICD-10-CM

## 2019-11-14 DIAGNOSIS — E03.9 ACQUIRED HYPOTHYROIDISM: ICD-10-CM

## 2019-11-14 LAB — TSH SERPL DL<=0.005 MIU/L-ACNC: 1.25 MU/L (ref 0.4–4)

## 2019-11-14 PROCEDURE — 36415 COLL VENOUS BLD VENIPUNCTURE: CPT | Performed by: INTERNAL MEDICINE

## 2019-11-14 PROCEDURE — 84443 ASSAY THYROID STIM HORMONE: CPT | Performed by: INTERNAL MEDICINE

## 2019-11-14 PROCEDURE — 82306 VITAMIN D 25 HYDROXY: CPT | Performed by: INTERNAL MEDICINE

## 2019-11-15 ENCOUNTER — MYC MEDICAL ADVICE (OUTPATIENT)
Dept: FAMILY MEDICINE | Facility: CLINIC | Age: 69
End: 2019-11-15

## 2019-11-15 LAB — DEPRECATED CALCIDIOL+CALCIFEROL SERPL-MC: 40 UG/L (ref 20–75)

## 2019-11-15 NOTE — TELEPHONE ENCOUNTER
Faye,   Please see below   Any ideas?  Even on Cryoocyte it's $350+  Please advise  Thanks,  Aleena ALVARADO RN

## 2019-11-15 NOTE — TELEPHONE ENCOUNTER
Everything I can see indicates that the Breo inhaler should be covered by her insurance.  I'm off site and unable to call the pharmacy - could someone call and find out if it's covered with a high copay vs not covered at all?  Then route back to me.    Faye Justice, PharmD, [facility]  Medication Therapy Management Provider  Pager: [phone]

## 2019-11-19 NOTE — TELEPHONE ENCOUNTER
Reviewed [facility] formulary online and it does show that medication is marked as Formulary brand and a preventative drug. Most likely insurance is in coverage gap or patient has not met deductible.    Tae Ramírez, CMA on 11/19/2019 at 2:36 PM

## 2019-11-20 NOTE — TELEPHONE ENCOUNTER
Tae looked into this. Duo-Neb or Ipratropium are most cost effective nebs on formula.        Sent MyChart to patient to inquire if she is agreeable to Nebs if Dr. Lares will replace Breo with nebs.     Waiting response.     Jennifer STEELE RN

## 2019-11-20 NOTE — TELEPHONE ENCOUNTER
I called the pharmacy to find out more about this.  Bijan co-pay was $100 (not the $400 she mentioned below).  This is a preferred inhaler on her formulary, she just has a high co-pay.  Unfortunately she's not eligible for coupon cards since she's on Medicare.  This is a tough situation, and really there's not much we can do in this case.  The cheapest option would be changing her to nebs.  Will defer to PCP (prescribing provider).    Faye Justice, PharmD, [facility]  Medication Therapy Management Provider  Pager: [phone]

## 2019-11-26 NOTE — TELEPHONE ENCOUNTER
Patient will handle it from here. Decided against the nebulizer as not as convenient.  Is checking with the  and pharmacy for help with this.  Rosie Maria RN

## 2019-12-13 ENCOUNTER — HOSPITAL ENCOUNTER (OUTPATIENT)
Dept: CARDIOLOGY | Facility: CLINIC | Age: 69
Discharge: HOME | End: 2019-12-13
Attending: INTERNAL MEDICINE | Admitting: INTERNAL MEDICINE
Payer: COMMERCIAL

## 2019-12-13 DIAGNOSIS — I71.20 THORACIC AORTIC ANEURYSM WITHOUT RUPTURE (H): ICD-10-CM

## 2019-12-13 PROCEDURE — 93306 TTE W/DOPPLER COMPLETE: CPT | Mod: 26 | Performed by: INTERNAL MEDICINE

## 2019-12-13 PROCEDURE — 93306 TTE W/DOPPLER COMPLETE: CPT

## 2020-02-20 ENCOUNTER — OFFICE VISIT (OUTPATIENT)
Dept: CARDIOLOGY | Facility: CLINIC | Age: 70
End: 2020-02-20
Attending: INTERNAL MEDICINE
Payer: COMMERCIAL

## 2020-02-20 ENCOUNTER — DOCUMENTATION ONLY (OUTPATIENT)
Dept: CARDIOLOGY | Facility: CLINIC | Age: 70
End: 2020-02-20

## 2020-02-20 VITALS
HEIGHT: 63 IN | DIASTOLIC BLOOD PRESSURE: 68 MMHG | SYSTOLIC BLOOD PRESSURE: 108 MMHG | BODY MASS INDEX: 24.45 KG/M2 | WEIGHT: 138 LBS | HEART RATE: 71 BPM

## 2020-02-20 DIAGNOSIS — R00.2 PALPITATIONS: ICD-10-CM

## 2020-02-20 DIAGNOSIS — R93.1 ABNORMAL ECHOCARDIOGRAM: ICD-10-CM

## 2020-02-20 DIAGNOSIS — R06.00 DYSPNEA, UNSPECIFIED TYPE: ICD-10-CM

## 2020-02-20 DIAGNOSIS — R00.2 PALPITATIONS: Primary | ICD-10-CM

## 2020-02-20 LAB
CHOLEST SERPL-MCNC: 242 MG/DL
HDLC SERPL-MCNC: 82 MG/DL
LDLC SERPL CALC-MCNC: 140 MG/DL
NONHDLC SERPL-MCNC: 160 MG/DL
TRIGL SERPL-MCNC: 102 MG/DL

## 2020-02-20 PROCEDURE — 99214 OFFICE O/P EST MOD 30 MIN: CPT | Performed by: INTERNAL MEDICINE

## 2020-02-20 PROCEDURE — 80061 LIPID PANEL: CPT | Performed by: INTERNAL MEDICINE

## 2020-02-20 PROCEDURE — 93000 ELECTROCARDIOGRAM COMPLETE: CPT | Performed by: INTERNAL MEDICINE

## 2020-02-20 PROCEDURE — 36415 COLL VENOUS BLD VENIPUNCTURE: CPT | Performed by: INTERNAL MEDICINE

## 2020-02-20 ASSESSMENT — MIFFLIN-ST. JEOR: SCORE: 1124.96
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
## 2020-02-20 NOTE — LETTER
2/20/2020      Caprice Lares MD  [address]  [facility] [address]      RE: Renea Willettrer       Dear Colleague,    I had the [facility]    Service Date: 02/20/2020      HISTORY OF PRESENT ILLNESS:  I had the pleasure of seeing Mr. Lerma in followup at the [facility] today.  She is a very pleasant 69-year-old female who has previously been followed by Dr. Bustamante, most recently in 02/2018.  She was seeing Dr. Bustamante for followup of a dilated ascending thoracic aorta as well as occasional dyspnea and chest discomfort on exertion.      Her previous cardiac evaluations have included most recently stress echocardiography in 08/2017, which was within normal limits.  She has also undergone an MR angiogram of the chest  most recently in 07/2017 which demonstrated mild dilatation of the ascending thoracic aorta with a maximal diameter of 4.2 cm.      From a risk factor standpoint, the patient does have a history of dyslipidemia, but has been reluctant to take statins in the past.        Today, she presents for followup, feeling well overall from a cardiovascular standpoint.  She does, however, have a few concerns, specifically related to dyspnea on exertion that she noted over the fall of 2019.  She noted this primarily while biking and while going up inclines in particular.  She denied any chest discomfort, although she did experience palpitations.  Despite this, she was able to bike up to 35 miles on weekends and remains very active in her normal everyday activities without any significant limitations.  Currently, she denies any palpitations, syncope or presyncope.  She denies any PND, orthopnea or lower extremity edema.      Her past medical history, family history, social history, allergies and medications are in Epic note.        Her physical exam is as dictated below.        I personally reviewed  her echocardiogram obtained on 2019 and compared it to prior study.  Mild dilatation of the aortic root and ascending thoracic aorta was noted.  Interestingly, there was concern raised regarding a possible anomalous origin of the right coronary artery.  This has not previously been documented and her CT of the chest from , at [facility], makes no mention of this potential abnormality.  I also reviewed her MRA from 2017, which does not demonstrate the coronary artery origins well.      A fasting lipid panel has been ordered for today and is pending.      IMPRESSION:   1.  Mild dilatation of the ascending thoracic aorta which has been stable.  There is no family history of aortic dissection.   2.  Dyslipidemia, characterized by an elevated LDL.   3.  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        Ms. Lerma presents for cardiology followup with complaints of occasional dyspnea that is primarily noticeable while exercising on an incline.  Her risk factors include dyslipidemia that has been untreated.      Given her symptoms of dyspnea on exertion and also the question of a possible anomalous origin of the right coronary artery, I have ordered a CT coronary angiogram for further evaluation.  Once these results available, we can make a more informed recommendation regarding the need for statin therapy as well.      If the CT coronary angiogram findings are within normal limits and she remains symptomatic, I think it would be reasonable to obtain a ZIO Patch monitor to rule out exercise-induced arrhythmia.  We can discuss this later if her symptoms recur.      It was a pleasure seeing her in followup today.         ROB MORLEY MD             D: 2020   T: 2020   MT: JORGE      Name:     DEX LERMA   MRN:      [id number]        Account:      [id number]   :      1950           Service Date: 2020      Document: [id number]         Outpatient  Encounter Medications as of 2/20/2020   Medication Sig Dispense Refill     albuterol (PROAIR HFA/PROVENTIL HFA/VENTOLIN HFA) 108 (90 Base) MCG/ACT inhaler Inhale 2 puffs into the lungs every 6 hours as needed for shortness of breath / dyspnea or wheezing 1 Inhaler 1     cholecalciferol (VITAMIN D3) 1000 UNIT tablet Take 2.5 tablets (2,500 Units) by mouth daily 30 tablet 11     Cyanocobalamin (VITAMIN B 12 PO) Take 250 mcg by mouth every 7 days       estradiol (VIVELLE-DOT) 0.0375 MG/24HR BIW patch Place 1 patch onto the skin twice a week 24 patch 3     fluticasone (FLONASE) 50 MCG/ACT spray Spray 1 spray into both nostrils daily       gabapentin 8 % GEL topical PLO cream Apply less than 0.2 grams on the neck QID prn 100 g 0     levothyroxine (SYNTHROID/LEVOTHROID) 75 MCG tablet Take 1 tablet (75 mcg) by mouth daily 90 tablet 3     multivitamin, therapeutic with minerals (MULTI-VITAMIN) TABS tablet Take 1 tablet by mouth daily 30 each 11     fluticasone-vilanterol (BREO ELLIPTA) 200-25 MCG/INH inhaler Inhale 1 puff into the lungs daily (Patient not taking: Reported on 2/20/2020) 1 Inhaler 3     DIOGENES-Mag Cl-Taurine-Theanine (CALM) SOLN        Facility-Administered Encounter Medications as of 2/20/2020   Medication Dose Route Frequency Provider Last Rate Last Dose     lidocaine 1% with EPINEPHrine 1:100,000 injection 1 mL  1 mL Intradermal Once Chiquis Panda MD           Again, thank you for allowing me to participate in the care of your patient.      Sincerely,    Kathia Casey MD     [facility]

## 2020-02-20 NOTE — PROGRESS NOTES
Service Date: 02/20/2020      HISTORY OF PRESENT ILLNESS:  I had the pleasure of seeing Mr. Lerma in followup at the [facility] today.  She is a very pleasant 69-year-old female who has previously been followed by Dr. Bustamante, most recently in 02/2018.  She was seeing Dr. Bustamante for followup of a dilated ascending thoracic aorta as well as occasional dyspnea and chest discomfort on exertion.      Her previous cardiac evaluations have included most recently stress echocardiography in 08/2017, which was within normal limits.  She has also undergone an MR angiogram of the chest  most recently in 07/2017 which demonstrated mild dilatation of the ascending thoracic aorta with a maximal diameter of 4.2 cm.      From a risk factor standpoint, the patient does have a history of dyslipidemia, but has been reluctant to take statins in the past.        Today, she presents for followup, feeling well overall from a cardiovascular standpoint.  She does, however, have a few concerns, specifically related to dyspnea on exertion that she noted over the fall of 2019.  She noted this primarily while biking and while going up inclines in particular.  She denied any chest discomfort, although she did experience palpitations.  Despite this, she was able to bike up to 35 miles on weekends and remains very active in her normal everyday activities without any significant limitations.  Currently, she denies any palpitations, syncope or presyncope.  She denies any PND, orthopnea or lower extremity edema.      Her past medical history, family history, social history, allergies and medications are in Epic note.        Her physical exam is as dictated below.        I personally reviewed her echocardiogram obtained on 12/13/2019 and compared it to prior study.  Mild dilatation of the aortic root and ascending thoracic aorta was noted.  Interestingly, there was concern raised regarding a possible anomalous  origin of the right coronary artery.  This has not previously been documented and her CT of the chest from 2014, at [facility], makes no mention of this potential abnormality.  I also reviewed her MRA from 2017, which does not demonstrate the coronary artery origins well.      A fasting lipid panel has been ordered for today and is pending.      IMPRESSION:   1.  Mild dilatation of the ascending thoracic aorta which has been stable.  There is no family history of aortic dissection.   2.  Dyslipidemia, characterized by an elevated LDL.   3.  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        Ms. Lerma presents for cardiology followup with complaints of occasional dyspnea that is primarily noticeable while exercising on an incline.  Her risk factors include dyslipidemia that has been untreated.      Given her symptoms of dyspnea on exertion and also the question of a possible anomalous origin of the right coronary artery, I have ordered a CT coronary angiogram for further evaluation.  Once these results available, we can make a more informed recommendation regarding the need for statin therapy as well.      If the CT coronary angiogram findings are within normal limits and she remains symptomatic, I think it would be reasonable to obtain a ZIO Patch monitor to rule out exercise-induced arrhythmia.  We can discuss this later if her symptoms recur.      It was a pleasure seeing her in followup today.         ROB MORLEY MD             D: 2020   T: 2020   MT: JORGE      Name:     DEX LERMA   MRN:      [id number]        Account:      [id number]   :      1950           Service Date: 2020      Document: [id number]
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
## 2020-02-20 NOTE — PROGRESS NOTES
Lipid panel drawn 2/20/2020 post-OV. CTa chest is scheduled for 2/27/2020.    Component      Latest Ref Rng & Units 2/20/2020   Cholesterol      <200 mg/dL 242 (H)   Triglycerides      <150 mg/dL 102   HDL Cholesterol      >49 mg/dL 82   LDL Cholesterol Calculated      <100 mg/dL 140 (H)   Non HDL Cholesterol      <130 mg/dL 160 (H)     Will message Dr. Casey to review

## 2020-02-27 ENCOUNTER — DOCUMENTATION ONLY (OUTPATIENT)
Dept: CARDIOLOGY | Facility: CLINIC | Age: 70
End: 2020-02-27

## 2020-02-27 ENCOUNTER — HOSPITAL ENCOUNTER (OUTPATIENT)
Age: 70
End: 2020-02-27
Payer: COMMERCIAL

## 2020-02-27 ENCOUNTER — HOSPITAL ENCOUNTER (OUTPATIENT)
Dept: CARDIOLOGY | Facility: CLINIC | Age: 70
Discharge: HOME | End: 2020-02-27
Attending: INTERNAL MEDICINE | Admitting: INTERNAL MEDICINE
Payer: COMMERCIAL

## 2020-02-27 ENCOUNTER — TELEPHONE (OUTPATIENT)
Dept: CARDIOLOGY | Facility: CLINIC | Age: 70
End: 2020-02-27

## 2020-02-27 VITALS — HEART RATE: 76 BPM | DIASTOLIC BLOOD PRESSURE: 54 MMHG | SYSTOLIC BLOOD PRESSURE: 127 MMHG

## 2020-02-27 DIAGNOSIS — R06.00 DYSPNEA, UNSPECIFIED TYPE: ICD-10-CM

## 2020-02-27 DIAGNOSIS — I25.10 CORONARY ARTERY DISEASE INVOLVING NATIVE HEART WITHOUT ANGINA PECTORIS, UNSPECIFIED VESSEL OR LESION TYPE: ICD-10-CM

## 2020-02-27 DIAGNOSIS — R93.1 ELEVATED CORONARY ARTERY CALCIUM SCORE: ICD-10-CM

## 2020-02-27 DIAGNOSIS — R06.09 DYSPNEA ON EXERTION: ICD-10-CM

## 2020-02-27 DIAGNOSIS — E78.2 MIXED HYPERLIPIDEMIA: ICD-10-CM

## 2020-02-27 DIAGNOSIS — R93.1 ABNORMAL ECHOCARDIOGRAM: ICD-10-CM

## 2020-02-27 DIAGNOSIS — R06.09 DYSPNEA ON EXERTION: Primary | ICD-10-CM

## 2020-02-27 PROCEDURE — 75574 CT ANGIO HRT W/3D IMAGE: CPT

## 2020-02-27 PROCEDURE — 25000128 H RX IP 250 OP 636: Performed by: INTERNAL MEDICINE

## 2020-02-27 PROCEDURE — 25000132 ZZH RX MED GY IP 250 OP 250 PS 637: Performed by: INTERNAL MEDICINE

## 2020-02-27 PROCEDURE — 75574 CT ANGIO HRT W/3D IMAGE: CPT | Mod: 26 | Performed by: INTERNAL MEDICINE

## 2020-02-27 RX ORDER — DILTIAZEM HYDROCHLORIDE 120 MG/1
120 TABLET, FILM COATED ORAL
Status: DISCONTINUED | OUTPATIENT
Start: 2020-02-27 | End: 2020-02-28 | Stop reason: HOSPADM

## 2020-02-27 RX ORDER — IVABRADINE 5 MG/1
5-15 TABLET, FILM COATED ORAL
Status: DISCONTINUED | OUTPATIENT
Start: 2020-02-27 | End: 2020-02-28 | Stop reason: HOSPADM

## 2020-02-27 RX ORDER — METOPROLOL TARTRATE 50 MG
50-100 TABLET ORAL
Status: COMPLETED | OUTPATIENT
Start: 2020-02-27 | End: 2020-02-27

## 2020-02-27 RX ORDER — IOPAMIDOL 755 MG/ML
500 INJECTION, SOLUTION INTRAVASCULAR ONCE
Status: COMPLETED | OUTPATIENT
Start: 2020-02-27 | End: 2020-02-27

## 2020-02-27 RX ORDER — ONDANSETRON 2 MG/ML
4 INJECTION INTRAMUSCULAR; INTRAVENOUS
Status: DISCONTINUED | OUTPATIENT
Start: 2020-02-27 | End: 2020-02-28 | Stop reason: HOSPADM

## 2020-02-27 RX ORDER — DILTIAZEM HYDROCHLORIDE 5 MG/ML
10-15 INJECTION INTRAVENOUS
Status: DISCONTINUED | OUTPATIENT
Start: 2020-02-27 | End: 2020-02-28 | Stop reason: HOSPADM

## 2020-02-27 RX ORDER — ROSUVASTATIN CALCIUM 20 MG/1
20 TABLET, COATED ORAL DAILY
Qty: 90 TABLET | Refills: 0 | Status: ON HOLD | OUTPATIENT
Start: 2020-02-27 | End: 2020-03-27

## 2020-02-27 RX ORDER — ACYCLOVIR 200 MG/1
0-1 CAPSULE ORAL
Status: DISCONTINUED | OUTPATIENT
Start: 2020-02-27 | End: 2020-02-28 | Stop reason: HOSPADM

## 2020-02-27 RX ORDER — METHYLPREDNISOLONE SODIUM SUCCINATE 125 MG/2ML
125 INJECTION, POWDER, LYOPHILIZED, FOR SOLUTION INTRAMUSCULAR; INTRAVENOUS
Status: DISCONTINUED | OUTPATIENT
Start: 2020-02-27 | End: 2020-02-28 | Stop reason: HOSPADM

## 2020-02-27 RX ORDER — DIPHENHYDRAMINE HCL 25 MG
25 CAPSULE ORAL
Status: DISCONTINUED | OUTPATIENT
Start: 2020-02-27 | End: 2020-02-28 | Stop reason: HOSPADM

## 2020-02-27 RX ORDER — DIPHENHYDRAMINE HYDROCHLORIDE 50 MG/ML
25-50 INJECTION INTRAMUSCULAR; INTRAVENOUS
Status: DISCONTINUED | OUTPATIENT
Start: 2020-02-27 | End: 2020-02-28 | Stop reason: HOSPADM

## 2020-02-27 RX ORDER — NITROGLYCERIN 0.4 MG/1
0.4 TABLET SUBLINGUAL
Status: COMPLETED | OUTPATIENT
Start: 2020-02-27 | End: 2020-02-27

## 2020-02-27 RX ORDER — METOPROLOL TARTRATE 1 MG/ML
5-15 INJECTION, SOLUTION INTRAVENOUS
Status: DISCONTINUED | OUTPATIENT
Start: 2020-02-27 | End: 2020-02-28 | Stop reason: HOSPADM

## 2020-02-27 RX ADMIN — NITROGLYCERIN 0.4 MG: 0.4 TABLET SUBLINGUAL at 10:22

## 2020-02-27 RX ADMIN — NITROGLYCERIN 0.4 MG: 0.4 TABLET SUBLINGUAL at 10:03

## 2020-02-27 RX ADMIN — METOPROLOL TARTRATE 100 MG: 50 TABLET, FILM COATED ORAL at 09:05

## 2020-02-27 RX ADMIN — IOPAMIDOL 225 ML: 755 INJECTION, SOLUTION INTRAVENOUS at 10:48

## 2020-02-27 NOTE — PROGRESS NOTES
CTa calcium score 2020 noted. Ordered to follow dyspnea on exertion and also the question of a possible anomalous origin of the right coronary artery. Patient to see LOLA Stephany Mcclelland on 3/5/2020 to review    CTa:   1.  Normal coronary origins. Calcified plaque involving the mid left anterior descending artery with associated moderate stenosis. The other coronary arteries demonstrate no significant plaque or stenosis.  2.  The proximal ascending aorta is mildly dilated, measuring 43.2 mm x 41.5 mm at the level of the main pulmonary artery.     Calcium score:   Total Agatston calcium score: 93.54   Left main: 8.09  Left anterior descendin.45  Left circumflex: 0  Right coronary artery: 0   This places the patient in the 73rd percentile when compared to age  and gender matched control group.    Reviewed by Dr. Jacinto parikh and angiogram was ordered.

## 2020-02-27 NOTE — TELEPHONE ENCOUNTER
----- Message from Kathia Casey MD sent at 2/27/2020  1:31 PM CST -----  I read this patient's CT angiogram today. She does have moderate disease involving the midLAD, and given that she is concerned about exertional dyspnea I think she should have an angiogram. Can we set this up with an LOLA? Thanks.    Kathia Casey MD  [facility] 2             I would also recommend she start  aspirin 81 MG and rosuvastatin 20 MG daily. Thanks.        Orders entered for angiogram and LOLA visit. Called patient to discuss, reviewed CTA results and Dr Casey's recommendation for LOLA OV, angiogram, ASA & statin. Pt verbalized understanding, agreeable to plan. Pt had many questions which were answered. Encouraged patient to write down any further questions and to bring them with her to her appointment. Pt has OTC ASA available, script sent in for rosuvastatin 20mg daily. Pt concerned about muscle aches as she has several friends who did not tolerate crestor. Reviewed that every body is different, will try for now and patient will call should she experience any myalgias while on crestor. Transferred to scheduling to arrange f/up and procedure.

## 2020-03-04 NOTE — PROGRESS NOTES
Primary Cardiologist: Dr. Casey    Reason for Visit: H&P for coronary angiogram    History of Present Illness:   This a very pleasant 69-year-old female with past medical history notable for mild ascending aorta dilatation, dyslipidemia, and coronary artery disease (moderate LAD lesions based on recent CT coronary angiogram; total calcium score 93), and hypothyroidism.    She was recently seen by Dr. 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  She was set up for CT coronary angiogram for further evaluation of this.  This showed normal coronary origins but patient did have moderate stenosis of the mid LAD with the remaining coronary tree demonstrating no significant plaque or stenosis.  Dr. Casey reviewed these results and recommended proceeding with coronary angiogram as patient continued to have dyspnea on exertion.  She is here today for history and physical.  She was initiated on aspirin as well as rosuvastatin.    She reports that she has been having DALEY and central chest pressure for several months now. She denies progression in her symptoms or any symptoms at rest. She has started taking aspirin and rosuvastatin and denies any issues with this.     Assessment and Plan:   This a very pleasant 69-year-old female with past medical history notable for mild ascending aorta dilatation, dyslipidemia, and coronary artery disease (moderate LAD lesions based on recent CT coronary angiogram; total calcium score 93), and hypothyroidism.    We talked about risk and benefits of coronary angiogram with her today, including vascular complication, contrast nephropathy, blood loss, stroke, emergent heart surgery, or allergic reaction to contrast dye. She will continue to take aspirin and rosuvastatin. We are not able to add BB or amlodipine given her soft BP. We reviewed the prep with her, including needing to be NPO the night  before.     Thank you for allowing me to participate in the care of this patient today.    This note was completed in part using Dragon voice recognition software. Although reviewed after completion, some word and grammatical errors may occur.    Orders this Visit:  Orders Placed This Encounter   Procedures     Basic metabolic panel     Follow-Up with Cardiac Advanced Practice Provider     No orders of the defined types were placed in this encounter.    There are no discontinued medications.      Encounter Diagnoses   Name Primary?     Dyspnea on exertion      Coronary artery disease of native artery of native heart with stable angina pectoris (H)      Elevated coronary artery calcium score      Mixed hyperlipidemia      Thoracic aortic aneurysm without rupture (H)      Chest discomfort Yes       CURRENT MEDICATIONS:  Current Outpatient Medications   Medication Sig Dispense Refill     albuterol (PROAIR HFA/PROVENTIL HFA/VENTOLIN HFA) 108 (90 Base) MCG/ACT inhaler Inhale 2 puffs into the lungs every 6 hours as needed for shortness of breath / dyspnea or wheezing 1 Inhaler 1     aspirin (ASA) 81 MG EC tablet Take 1 tablet (81 mg) by mouth daily       cholecalciferol (VITAMIN D3) 1000 UNIT tablet Take 2.5 tablets (2,500 Units) by mouth daily 30 tablet 11     estradiol (VIVELLE-DOT) 0.0375 MG/24HR BIW patch Place 1 patch onto the skin twice a week 24 patch 3     fluticasone (FLONASE) 50 MCG/ACT spray Spray 1 spray into both nostrils daily       gabapentin 8 % GEL topical PLO cream Apply less than 0.2 grams on the neck QID prn 100 g 0     levothyroxine (SYNTHROID/LEVOTHROID) 75 MCG tablet Take 1 tablet (75 mcg) by mouth daily 90 tablet 3     multivitamin, therapeutic with minerals (MULTI-VITAMIN) TABS tablet Take 1 tablet by mouth daily 30 each 11     rosuvastatin (CRESTOR) 20 MG tablet Take 1 tablet (20 mg) by mouth daily at bedtime 90 tablet 0     Cyanocobalamin (VITAMIN B 12 PO) Take 250 mcg by mouth every 7 days        fluticasone-vilanterol (BREO ELLIPTA) 200-25 MCG/INH inhaler Inhale 1 puff into the lungs daily (Patient not taking: Reported on 2/20/2020) 1 Inhaler 3     DIOGENES-Mag Cl-Taurine-Theanine (CALM) SOLN          ALLERGIES     Allergies   Allergen Reactions     Dust Mites      Mold      No Known Drug Allergies      Seasonal Allergies      Adhesive Tape Rash       PAST MEDICAL HISTORY:  Past Medical History:   Diagnosis Date     Aortic root dilatation (H)      Asthma      BCC (basal cell carcinoma of skin) 2010    rt shoulder     Chronic hoarseness     congential vocal chord closure issue     Dysphagia     esophageal dilatation per [facility] 2/2019     Elevated coronary artery calcium score     CTa calcium score total 93.5 / 73rd percentile     Endometrial ca (H)     stage 1     Family hx of colon cancer      GERD (gastroesophageal reflux disease)      Hormone replacement therapy      Hyperlipidemia LDL goal <130 11/4/2016     Hypothyroidism      Nonrheumatic aortic valve insufficiency 11/4/2016     Nutcracker esophagus      Schatzki's ring of distal esophagus      Shoulder arthritis     rt     Thoracic aortic aneurysm without rupture (H)     4.3 cm     Vitamin D deficiency 11/4/2016       PAST SURGICAL HISTORY:  Past Surgical History:   Procedure Laterality Date     BIOPSY       C VAG HYST,UTERUS >250 GMS,REM TUBE/OVARY  2000     COLONOSCOPY       COLONOSCOPY N/A 12/12/2016    Procedure: COLONOSCOPY;  Surgeon: Manjinder Vera MD;  Location:  GI     TONSILLECTOMY         FAMILY HISTORY:  Family History   Problem Relation Age of Onset     Diabetes Father        SOCIAL HISTORY:  Social History     Socioeconomic History     Marital status: Single     Spouse name: None     Number of children: None     Years of education: None     Highest education level: None   Occupational History     None   Social Needs     Financial resource strain: None     Food insecurity:     Worry: None     Inability: None     Transportation needs:      "Medical: None     Non-medical: None   Tobacco Use     Smoking status: Never Smoker     Smokeless tobacco: Never Used   Substance and Sexual Activity     Alcohol use: Yes     Comment: 1 glass of wine per week     Drug use: No     Sexual activity: Not Currently     Partners: Male     Birth control/protection: Post-menopausal   Lifestyle     Physical activity:     Days per week: None     Minutes per session: None     Stress: None   Relationships     Social connections:     Talks on phone: None     Gets together: None     Attends Moravian service: None     Active member of club or organization: None     Attends meetings of clubs or organizations: None     Relationship status: None     Intimate partner violence:     Fear of current or ex partner: None     Emotionally abused: None     Physically abused: None     Forced sexual activity: None   Other Topics Concern     Parent/sibling w/ CABG, MI or angioplasty before 65F 55M? No   Social History Narrative    Single, 2 children       Review of Systems:  Skin:  Negative     Eyes:  Positive for glasses(dry eyes )  ENT:  Positive for postnasal drainage  Respiratory:  Positive for dyspnea on exertion;shortness of breath  Cardiovascular:  Negative;edema Positive for;chest pain;fatigue(chest tightness)  Gastroenterology: Negative    Genitourinary:  Negative    Musculoskeletal:  Positive for neck pain  Neurologic:  Positive for headaches;numbness or tingling of hands;numbness or tingling of feet(pt has TMJ)  Psychiatric:  Negative for    Heme/Lymph/Imm:  Negative    Endocrine:  Negative      Physical Exam:  Vitals: /59   Pulse 87   Ht 1.608 m (5' 3.31\")   Wt 64.5 kg (142 lb 4.8 oz)   BMI 24.96 kg/m       GEN:  NAD  NECK: No JVD  C/V:  Regular rate and rhythm, no murmur, rub or gallop.  RESP: Clear to auscultation bilaterally.  GI: Abdomen soft, nontender, nondistended.   EXTREM: No LE edema.   NEURO: Alert and oriented, cooperative. No obvious focal deficits.   PSYCH: " Normal affect.  SKIN: Warm and dry.       Recent Lab Results:  LIPID RESULTS:  Lab Results   Component Value Date    CHOL 242 (H) 02/20/2020    HDL 82 02/20/2020     (H) 02/20/2020    TRIG 102 02/20/2020    CHOLHDLRATIO 3.15 04/17/2012    CHOLHDLRATIO 2.6 11/13/2007       LIVER ENZYME RESULTS:  Lab Results   Component Value Date    AST 21 11/04/2019    ALT 23 11/04/2019       CBC RESULTS:  Lab Results   Component Value Date    WBC 4.8 11/04/2019    RBC 4.06 11/04/2019    HGB 12.7 11/04/2019    HCT 37.0 11/04/2019    MCV 91 11/04/2019    MCH 31.3 11/04/2019    MCHC 34.3 11/04/2019    RDW 13.1 11/04/2019     11/04/2019       BMP RESULTS:  Lab Results   Component Value Date     11/04/2019    POTASSIUM 4.4 11/04/2019    CHLORIDE 107 11/04/2019    CO2 25 11/04/2019    ANIONGAP 7 11/04/2019    GLC 87 11/04/2019    BUN 20 11/04/2019    CR 0.67 11/04/2019    GFRESTIMATED 89 11/04/2019    GFRESTBLACK >90 11/04/2019    HAWK 9.1 11/04/2019        A1C RESULTS:  No results found for: A1C    INR RESULTS:  No results found for: INR        Stephany Mcclelland PA-C, [facility]   March 4, 2020

## 2020-03-05 ENCOUNTER — OFFICE VISIT (OUTPATIENT)
Dept: CARDIOLOGY | Facility: CLINIC | Age: 70
End: 2020-03-05
Attending: INTERNAL MEDICINE
Payer: COMMERCIAL

## 2020-03-05 VITALS
HEART RATE: 87 BPM | HEIGHT: 63 IN | WEIGHT: 142.3 LBS | DIASTOLIC BLOOD PRESSURE: 59 MMHG | BODY MASS INDEX: 25.21 KG/M2 | SYSTOLIC BLOOD PRESSURE: 102 MMHG

## 2020-03-05 DIAGNOSIS — I25.118 CORONARY ARTERY DISEASE OF NATIVE ARTERY OF NATIVE HEART WITH STABLE ANGINA PECTORIS (H): ICD-10-CM

## 2020-03-05 DIAGNOSIS — R06.09 DYSPNEA ON EXERTION: ICD-10-CM

## 2020-03-05 DIAGNOSIS — R93.1 ELEVATED CORONARY ARTERY CALCIUM SCORE: ICD-10-CM

## 2020-03-05 DIAGNOSIS — I71.20 THORACIC AORTIC ANEURYSM WITHOUT RUPTURE (H): ICD-10-CM

## 2020-03-05 DIAGNOSIS — R07.89 CHEST DISCOMFORT: Primary | ICD-10-CM

## 2020-03-05 DIAGNOSIS — E78.2 MIXED HYPERLIPIDEMIA: ICD-10-CM

## 2020-03-05 PROCEDURE — 99214 OFFICE O/P EST MOD 30 MIN: CPT | Performed by: PHYSICIAN ASSISTANT

## 2020-03-05 ASSESSMENT — MIFFLIN-ST. JEOR: SCORE: 1144.52

## 2020-03-05 NOTE — LETTER
3/5/2020    Caprice Lares MD  [address]  [facility] [address]    RE: Renea Lerma       Dear Colleague,    I had the pleasure of seeing Renea Lerma in the [facility].    Primary Cardiologist: Dr. Casey    Reason for Visit: H&P for coronary angiogram    History of Present Illness:   This a very pleasant 69-year-old female with past medical history notable for mild ascending aorta dilatation, dyslipidemia, and coronary artery disease (moderate LAD lesions based on recent CT coronary angiogram; total calcium score 93), and hypothyroidism.    She was recently seen by Dr. 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  She was set up for CT coronary angiogram for further evaluation of this.  This showed normal coronary origins but patient did have moderate stenosis of the mid LAD with the remaining coronary tree demonstrating no significant plaque or stenosis.  Dr. Casey reviewed these results and recommended proceeding with coronary angiogram as patient continued to have dyspnea on exertion.  She is here today for history and physical.  She was initiated on aspirin as well as rosuvastatin.    She reports that she has been having DALEY and central chest pressure for several months now. She denies progression in her symptoms or any symptoms at rest. She has started taking aspirin and rosuvastatin and denies any issues with this.     Assessment and Plan:   This a very pleasant 69-year-old female with past medical history notable for mild ascending aorta dilatation, dyslipidemia, and coronary artery disease (moderate LAD lesions based on recent CT coronary angiogram; total calcium score 93), and hypothyroidism.    We talked about risk and benefits of coronary angiogram with her today, including vascular complication, contrast nephropathy, blood loss, stroke, emergent heart surgery,  or allergic reaction to contrast dye. She will continue to take aspirin and rosuvastatin. We are not able to add BB or amlodipine given her soft BP. We reviewed the prep with her, including needing to be NPO the night before.     Thank you for allowing me to participate in the care of this patient today.    This note was completed in part using Dragon voice recognition software. Although reviewed after completion, some word and grammatical errors may occur.    Orders this Visit:  Orders Placed This Encounter   Procedures     Basic metabolic panel     Follow-Up with Cardiac Advanced Practice Provider     No orders of the defined types were placed in this encounter.    There are no discontinued medications.      Encounter Diagnoses   Name Primary?     Dyspnea on exertion      Coronary artery disease of native artery of native heart with stable angina pectoris (H)      Elevated coronary artery calcium score      Mixed hyperlipidemia      Thoracic aortic aneurysm without rupture (H)      Chest discomfort Yes       CURRENT MEDICATIONS:  Current Outpatient Medications   Medication Sig Dispense Refill     albuterol (PROAIR HFA/PROVENTIL HFA/VENTOLIN HFA) 108 (90 Base) MCG/ACT inhaler Inhale 2 puffs into the lungs every 6 hours as needed for shortness of breath / dyspnea or wheezing 1 Inhaler 1     aspirin (ASA) 81 MG EC tablet Take 1 tablet (81 mg) by mouth daily       cholecalciferol (VITAMIN D3) 1000 UNIT tablet Take 2.5 tablets (2,500 Units) by mouth daily 30 tablet 11     estradiol (VIVELLE-DOT) 0.0375 MG/24HR BIW patch Place 1 patch onto the skin twice a week 24 patch 3     fluticasone (FLONASE) 50 MCG/ACT spray Spray 1 spray into both nostrils daily       gabapentin 8 % GEL topical PLO cream Apply less than 0.2 grams on the neck QID prn 100 g 0     levothyroxine (SYNTHROID/LEVOTHROID) 75 MCG tablet Take 1 tablet (75 mcg) by mouth daily 90 tablet 3     multivitamin, therapeutic with minerals (MULTI-VITAMIN) TABS  tablet Take 1 tablet by mouth daily 30 each 11     rosuvastatin (CRESTOR) 20 MG tablet Take 1 tablet (20 mg) by mouth daily at bedtime 90 tablet 0     Cyanocobalamin (VITAMIN B 12 PO) Take 250 mcg by mouth every 7 days       fluticasone-vilanterol (BREO ELLIPTA) 200-25 MCG/INH inhaler Inhale 1 puff into the lungs daily (Patient not taking: Reported on 2/20/2020) 1 Inhaler 3     DIOGENES-Mag Cl-Taurine-Theanine (CALM) SOLN          ALLERGIES     Allergies   Allergen Reactions     Dust Mites      Mold      No Known Drug Allergies      Seasonal Allergies      Adhesive Tape Rash       PAST MEDICAL HISTORY:  Past Medical History:   Diagnosis Date     Aortic root dilatation (H)      Asthma      BCC (basal cell carcinoma of skin) 2010    rt shoulder     Chronic hoarseness     congential vocal chord closure issue     Dysphagia     esophageal dilatation per [facility] 2/2019     Elevated coronary artery calcium score     CTa calcium score total 93.5 / 73rd percentile     Endometrial ca (H)     stage 1     Family hx of colon cancer      GERD (gastroesophageal reflux disease)      Hormone replacement therapy      Hyperlipidemia LDL goal <130 11/4/2016     Hypothyroidism      Nonrheumatic aortic valve insufficiency 11/4/2016     Nutcracker esophagus      Schatzki's ring of distal esophagus      Shoulder arthritis     rt     Thoracic aortic aneurysm without rupture (H)     4.3 cm     Vitamin D deficiency 11/4/2016       PAST SURGICAL HISTORY:  Past Surgical History:   Procedure Laterality Date     BIOPSY       C VAG HYST,UTERUS >250 GMS,REM TUBE/OVARY  2000     COLONOSCOPY       COLONOSCOPY N/A 12/12/2016    Procedure: COLONOSCOPY;  Surgeon: Manjinder Vera MD;  Location:  GI     TONSILLECTOMY         FAMILY HISTORY:  Family History   Problem Relation Age of Onset     Diabetes Father        SOCIAL HISTORY:  Social History     Socioeconomic History     Marital status: Single     Spouse name: None     Number of children: None     Years of  "education: None     Highest education level: None   Occupational History     None   Social Needs     Financial resource strain: None     Food insecurity:     Worry: None     Inability: None     Transportation needs:     Medical: None     Non-medical: None   Tobacco Use     Smoking status: Never Smoker     Smokeless tobacco: Never Used   Substance and Sexual Activity     Alcohol use: Yes     Comment: 1 glass of wine per week     Drug use: No     Sexual activity: Not Currently     Partners: Male     Birth control/protection: Post-menopausal   Lifestyle     Physical activity:     Days per week: None     Minutes per session: None     Stress: None   Relationships     Social connections:     Talks on phone: None     Gets together: None     Attends Jain service: None     Active member of club or organization: None     Attends meetings of clubs or organizations: None     Relationship status: None     Intimate partner violence:     Fear of current or ex partner: None     Emotionally abused: None     Physically abused: None     Forced sexual activity: None   Other Topics Concern     Parent/sibling w/ CABG, MI or angioplasty before 65F 55M? No   Social History Narrative    Single, 2 children       Review of Systems:  Skin:  Negative     Eyes:  Positive for glasses(dry eyes )  ENT:  Positive for postnasal drainage  Respiratory:  Positive for dyspnea on exertion;shortness of breath  Cardiovascular:  Negative;edema Positive for;chest pain;fatigue(chest tightness)  Gastroenterology: Negative    Genitourinary:  Negative    Musculoskeletal:  Positive for neck pain  Neurologic:  Positive for headaches;numbness or tingling of hands;numbness or tingling of feet(pt has TMJ)  Psychiatric:  Negative for    Heme/Lymph/Imm:  Negative    Endocrine:  Negative      Physical Exam:  Vitals: /59   Pulse 87   Ht 1.608 m (5' 3.31\")   Wt 64.5 kg (142 lb 4.8 oz)   BMI 24.96 kg/m       GEN:  NAD  NECK: No JVD  C/V:  Regular rate and " rhythm, no murmur, rub or gallop.  RESP: Clear to auscultation bilaterally.  GI: Abdomen soft, nontender, nondistended.   EXTREM: No LE edema.   NEURO: Alert and oriented, cooperative. No obvious focal deficits.   PSYCH: Normal affect.  SKIN: Warm and dry.       Recent Lab Results:  LIPID RESULTS:  Lab Results   Component Value Date    CHOL 242 (H) 02/20/2020    HDL 82 02/20/2020     (H) 02/20/2020    TRIG 102 02/20/2020    CHOLHDLRATIO 3.15 04/17/2012    CHOLHDLRATIO 2.6 11/13/2007       LIVER ENZYME RESULTS:  Lab Results   Component Value Date    AST 21 11/04/2019    ALT 23 11/04/2019       CBC RESULTS:  Lab Results   Component Value Date    WBC 4.8 11/04/2019    RBC 4.06 11/04/2019    HGB 12.7 11/04/2019    HCT 37.0 11/04/2019    MCV 91 11/04/2019    MCH 31.3 11/04/2019    MCHC 34.3 11/04/2019    RDW 13.1 11/04/2019     11/04/2019       BMP RESULTS:  Lab Results   Component Value Date     11/04/2019    POTASSIUM 4.4 11/04/2019    CHLORIDE 107 11/04/2019    CO2 25 11/04/2019    ANIONGAP 7 11/04/2019    GLC 87 11/04/2019    BUN 20 11/04/2019    CR 0.67 11/04/2019    GFRESTIMATED 89 11/04/2019    GFRESTBLACK >90 11/04/2019    HAWK 9.1 11/04/2019        A1C RESULTS:  No results found for: A1C    INR RESULTS:  No results found for: INR          Thank you for allowing me to participate in the care of your patient.    Sincerely,     Stephany Mcclelland PA-C     [facility]

## 2020-03-05 NOTE — LETTER
3/5/2020    Caprice Lares MD  [address]  [facility] [address]    RE: Renea Lerma       Dear Colleague,    I had the pleasure of seeing Renea Lerma in the [facility].    Primary Cardiologist: Dr. aCsey    Reason for Visit: H&P for coronary angiogram    History of Present Illness:   This a very pleasant 69-year-old female with past medical history notable for mild ascending aorta dilatation, dyslipidemia, and coronary artery disease (moderate LAD lesions based on recent CT coronary angiogram; total calcium score 93), and hypothyroidism.    She was recently seen by Dr. 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  She was set up for CT coronary angiogram for further evaluation of this.  This showed normal coronary origins but patient did have moderate stenosis of the mid LAD with the remaining coronary tree demonstrating no significant plaque or stenosis.  Dr. Casey reviewed these results and recommended proceeding with coronary angiogram as patient continued to have dyspnea on exertion.  She is here today for history and physical.  She was initiated on aspirin as well as rosuvastatin.    She reports that she has been having DALEY and central chest pressure for several months now. She denies progression in her symptoms or any symptoms at rest. She has started taking aspirin and rosuvastatin and denies any issues with this.     Assessment and Plan:   This a very pleasant 69-year-old female with past medical history notable for mild ascending aorta dilatation, dyslipidemia, and coronary artery disease (moderate LAD lesions based on recent CT coronary angiogram; total calcium score 93), and hypothyroidism.    We talked about risk and benefits of coronary angiogram with her today, including vascular complication, contrast nephropathy, blood loss, stroke, emergent heart surgery,  or allergic reaction to contrast dye. She will continue to take aspirin and rosuvastatin. We are not able to add BB or amlodipine given her soft BP. We reviewed the prep with her, including needing to be NPO the night before.     Thank you for allowing me to participate in the care of this patient today.    This note was completed in part using Dragon voice recognition software. Although reviewed after completion, some word and grammatical errors may occur.    Orders this Visit:  Orders Placed This Encounter   Procedures     Basic metabolic panel     Follow-Up with Cardiac Advanced Practice Provider     No orders of the defined types were placed in this encounter.    There are no discontinued medications.      Encounter Diagnoses   Name Primary?     Dyspnea on exertion      Coronary artery disease of native artery of native heart with stable angina pectoris (H)      Elevated coronary artery calcium score      Mixed hyperlipidemia      Thoracic aortic aneurysm without rupture (H)      Chest discomfort Yes       CURRENT MEDICATIONS:  Current Outpatient Medications   Medication Sig Dispense Refill     albuterol (PROAIR HFA/PROVENTIL HFA/VENTOLIN HFA) 108 (90 Base) MCG/ACT inhaler Inhale 2 puffs into the lungs every 6 hours as needed for shortness of breath / dyspnea or wheezing 1 Inhaler 1     aspirin (ASA) 81 MG EC tablet Take 1 tablet (81 mg) by mouth daily       cholecalciferol (VITAMIN D3) 1000 UNIT tablet Take 2.5 tablets (2,500 Units) by mouth daily 30 tablet 11     estradiol (VIVELLE-DOT) 0.0375 MG/24HR BIW patch Place 1 patch onto the skin twice a week 24 patch 3     fluticasone (FLONASE) 50 MCG/ACT spray Spray 1 spray into both nostrils daily       gabapentin 8 % GEL topical PLO cream Apply less than 0.2 grams on the neck QID prn 100 g 0     levothyroxine (SYNTHROID/LEVOTHROID) 75 MCG tablet Take 1 tablet (75 mcg) by mouth daily 90 tablet 3     multivitamin, therapeutic with minerals (MULTI-VITAMIN) TABS  tablet Take 1 tablet by mouth daily 30 each 11     rosuvastatin (CRESTOR) 20 MG tablet Take 1 tablet (20 mg) by mouth daily at bedtime 90 tablet 0     Cyanocobalamin (VITAMIN B 12 PO) Take 250 mcg by mouth every 7 days       fluticasone-vilanterol (BREO ELLIPTA) 200-25 MCG/INH inhaler Inhale 1 puff into the lungs daily (Patient not taking: Reported on 2/20/2020) 1 Inhaler 3     DIOGENES-Mag Cl-Taurine-Theanine (CALM) SOLN          ALLERGIES     Allergies   Allergen Reactions     Dust Mites      Mold      No Known Drug Allergies      Seasonal Allergies      Adhesive Tape Rash       PAST MEDICAL HISTORY:  Past Medical History:   Diagnosis Date     Aortic root dilatation (H)      Asthma      BCC (basal cell carcinoma of skin) 2010    rt shoulder     Chronic hoarseness     congential vocal chord closure issue     Dysphagia     esophageal dilatation per [facility] 2/2019     Elevated coronary artery calcium score     CTa calcium score total 93.5 / 73rd percentile     Endometrial ca (H)     stage 1     Family hx of colon cancer      GERD (gastroesophageal reflux disease)      Hormone replacement therapy      Hyperlipidemia LDL goal <130 11/4/2016     Hypothyroidism      Nonrheumatic aortic valve insufficiency 11/4/2016     Nutcracker esophagus      Schatzki's ring of distal esophagus      Shoulder arthritis     rt     Thoracic aortic aneurysm without rupture (H)     4.3 cm     Vitamin D deficiency 11/4/2016       PAST SURGICAL HISTORY:  Past Surgical History:   Procedure Laterality Date     BIOPSY       C VAG HYST,UTERUS >250 GMS,REM TUBE/OVARY  2000     COLONOSCOPY       COLONOSCOPY N/A 12/12/2016    Procedure: COLONOSCOPY;  Surgeon: Manjinder Vera MD;  Location:  GI     TONSILLECTOMY         FAMILY HISTORY:  Family History   Problem Relation Age of Onset     Diabetes Father        SOCIAL HISTORY:  Social History     Socioeconomic History     Marital status: Single     Spouse name: None     Number of children: None     Years of  "education: None     Highest education level: None   Occupational History     None   Social Needs     Financial resource strain: None     Food insecurity:     Worry: None     Inability: None     Transportation needs:     Medical: None     Non-medical: None   Tobacco Use     Smoking status: Never Smoker     Smokeless tobacco: Never Used   Substance and Sexual Activity     Alcohol use: Yes     Comment: 1 glass of wine per week     Drug use: No     Sexual activity: Not Currently     Partners: Male     Birth control/protection: Post-menopausal   Lifestyle     Physical activity:     Days per week: None     Minutes per session: None     Stress: None   Relationships     Social connections:     Talks on phone: None     Gets together: None     Attends Muslim service: None     Active member of club or organization: None     Attends meetings of clubs or organizations: None     Relationship status: None     Intimate partner violence:     Fear of current or ex partner: None     Emotionally abused: None     Physically abused: None     Forced sexual activity: None   Other Topics Concern     Parent/sibling w/ CABG, MI or angioplasty before 65F 55M? No   Social History Narrative    Single, 2 children       Review of Systems:  Skin:  Negative     Eyes:  Positive for glasses(dry eyes )  ENT:  Positive for postnasal drainage  Respiratory:  Positive for dyspnea on exertion;shortness of breath  Cardiovascular:  Negative;edema Positive for;chest pain;fatigue(chest tightness)  Gastroenterology: Negative    Genitourinary:  Negative    Musculoskeletal:  Positive for neck pain  Neurologic:  Positive for headaches;numbness or tingling of hands;numbness or tingling of feet(pt has TMJ)  Psychiatric:  Negative for    Heme/Lymph/Imm:  Negative    Endocrine:  Negative      Physical Exam:  Vitals: /59   Pulse 87   Ht 1.608 m (5' 3.31\")   Wt 64.5 kg (142 lb 4.8 oz)   BMI 24.96 kg/m        GEN:  NAD  NECK: No JVD  C/V:  Regular rate and " rhythm, no murmur, rub or gallop.  RESP: Clear to auscultation bilaterally.  GI: Abdomen soft, nontender, nondistended.   EXTREM: No LE edema.   NEURO: Alert and oriented, cooperative. No obvious focal deficits.   PSYCH: Normal affect.  SKIN: Warm and dry.       Recent Lab Results:  LIPID RESULTS:  Lab Results   Component Value Date    CHOL 242 (H) 02/20/2020    HDL 82 02/20/2020     (H) 02/20/2020    TRIG 102 02/20/2020    CHOLHDLRATIO 3.15 04/17/2012    CHOLHDLRATIO 2.6 11/13/2007       LIVER ENZYME RESULTS:  Lab Results   Component Value Date    AST 21 11/04/2019    ALT 23 11/04/2019       CBC RESULTS:  Lab Results   Component Value Date    WBC 4.8 11/04/2019    RBC 4.06 11/04/2019    HGB 12.7 11/04/2019    HCT 37.0 11/04/2019    MCV 91 11/04/2019    MCH 31.3 11/04/2019    MCHC 34.3 11/04/2019    RDW 13.1 11/04/2019     11/04/2019       BMP RESULTS:  Lab Results   Component Value Date     11/04/2019    POTASSIUM 4.4 11/04/2019    CHLORIDE 107 11/04/2019    CO2 25 11/04/2019    ANIONGAP 7 11/04/2019    GLC 87 11/04/2019    BUN 20 11/04/2019    CR 0.67 11/04/2019    GFRESTIMATED 89 11/04/2019    GFRESTBLACK >90 11/04/2019    HAWK 9.1 11/04/2019        A1C RESULTS:  No results found for: A1C    INR RESULTS:  No results found for: INR        Stephany Mcclelland PA-C, MARIA TERESA   March 4, 2020       Thank you for allowing me to participate in the care of your patient.      Sincerely,     Stephany Mcclelland PA-C     [facility]    cc:   Kathia Casey MD  [address]  [place] [address]

## 2020-03-05 NOTE — PATIENT INSTRUCTIONS
Today's Plan:   1) We will schedule angiogram and follow up afterwards.   2) Continue with aspirin and rosuvastatin.     If you have questions or concerns please call my nurse team at [phone].     Scheduling phone number: [phone]  Reminder: Please bring in all current medications, over the counter supplements and vitamin bottles to your next appointment.    It was a pleasure seeing you today!     Stephany Mcclelland PA-C  3/5/2020

## 2020-03-09 ENCOUNTER — TELEPHONE (OUTPATIENT)
Dept: CARDIOLOGY | Facility: CLINIC | Age: 70
End: 2020-03-09

## 2020-03-09 ENCOUNTER — TELEPHONE (OUTPATIENT)
Dept: FAMILY MEDICINE | Facility: CLINIC | Age: 70
End: 2020-03-09

## 2020-03-09 NOTE — TELEPHONE ENCOUNTER
Pt requesting advise on if safe for her to fly with heart conditions/asthma tomorrow.    Has Heart Cath scheduled for 3/23/20    Aziza Connolly RN

## 2020-03-09 NOTE — TELEPHONE ENCOUNTER
Spoke with Pt:     Advised safe to fly per PCP, HOWEVER, dicsussed that we do not want her to fall ill. Practice excellent hand hygiene, wipe down tray tables and air vents. Drink plenty of fluids. If noticing someone coughing and not covering, do not be afraid to ask them to do so.     Pt verbalized agreement    Jennifer STEELE RN

## 2020-03-09 NOTE — TELEPHONE ENCOUNTER
Reason for Call:  Other     Detailed comments: Pt found out she has a 70% blockage in her heart.  She is due to fly to [place] on 3/10.  She wonders if it is safe for her to fly,  She needs to be called back by 11:58 so she can still get a refund on her trip   Phone Number Patient can be reached at: Home number on file [phone] (home)    Best Time: any    Can we leave a detailed message on this number? YES    Call taken on 3/9/2020 at 8:39 AM by Mariel Krueger

## 2020-03-09 NOTE — TELEPHONE ENCOUNTER
Pateint called asking about the restrictions on flying.  Patient scheduled to fly to [place] tomorrow.  Patient has asthma and is scheduled for a Acoronary angiogram 3-.  Patient will contact PCP regarding trup.

## 2020-03-17 ENCOUNTER — TELEPHONE (OUTPATIENT)
Dept: CARDIOLOGY | Facility: CLINIC | Age: 70
End: 2020-03-17

## 2020-03-24 ENCOUNTER — NURSE TRIAGE (OUTPATIENT)
Dept: CARDIOLOGY | Facility: CLINIC | Age: 70
End: 2020-03-24

## 2020-03-24 ENCOUNTER — HOSPITAL ENCOUNTER (INPATIENT)
Facility: CLINIC | Age: 70
LOS: 3 days | Discharge: HOME | DRG: 313 | End: 2020-03-27
Attending: EMERGENCY MEDICINE | Admitting: HOSPITALIST
Payer: COMMERCIAL

## 2020-03-24 ENCOUNTER — APPOINTMENT (OUTPATIENT)
Dept: GENERAL RADIOLOGY | Facility: CLINIC | Age: 70
DRG: 313 | End: 2020-03-24
Attending: EMERGENCY MEDICINE
Payer: COMMERCIAL

## 2020-03-24 DIAGNOSIS — E78.2 MIXED HYPERLIPIDEMIA: ICD-10-CM

## 2020-03-24 DIAGNOSIS — R07.9 ACUTE CHEST PAIN: ICD-10-CM

## 2020-03-24 DIAGNOSIS — I25.10 CORONARY ARTERY DISEASE INVOLVING NATIVE HEART WITHOUT ANGINA PECTORIS, UNSPECIFIED VESSEL OR LESION TYPE: ICD-10-CM

## 2020-03-24 DIAGNOSIS — R93.1 ELEVATED CORONARY ARTERY CALCIUM SCORE: ICD-10-CM

## 2020-03-24 PROBLEM — I24.9 ACS (ACUTE CORONARY SYNDROME) (H): Status: ACTIVE | Noted: 2020-03-24

## 2020-03-24 LAB
ANION GAP SERPL CALCULATED.3IONS-SCNC: 10 MMOL/L (ref 3–14)
BASOPHILS # BLD AUTO: 0 10E9/L (ref 0–0.2)
BASOPHILS NFR BLD AUTO: 0.5 %
BUN SERPL-MCNC: 20 MG/DL (ref 7–30)
CALCIUM SERPL-MCNC: 9.1 MG/DL (ref 8.5–10.1)
CHLORIDE SERPL-SCNC: 109 MMOL/L (ref 94–109)
CO2 SERPL-SCNC: 22 MMOL/L (ref 20–32)
CREAT SERPL-MCNC: 0.7 MG/DL (ref 0.52–1.04)
D DIMER PPP FEU-MCNC: 0.4 UG/ML FEU (ref 0–0.5)
DIFFERENTIAL METHOD BLD: NORMAL
EOSINOPHIL # BLD AUTO: 0.2 10E9/L (ref 0–0.7)
EOSINOPHIL NFR BLD AUTO: 2.9 %
ERYTHROCYTE [DISTWIDTH] IN BLOOD BY AUTOMATED COUNT: 12.6 % (ref 10–15)
GFR SERPL CREATININE-BSD FRML MDRD: 88 ML/MIN/{1.73_M2}
GLUCOSE SERPL-MCNC: 93 MG/DL (ref 70–99)
HCT VFR BLD AUTO: 36.2 % (ref 35–47)
HGB BLD-MCNC: 12.5 G/DL (ref 11.7–15.7)
IMM GRANULOCYTES # BLD: 0 10E9/L (ref 0–0.4)
IMM GRANULOCYTES NFR BLD: 0.2 %
INTERPRETATION ECG - MUSE: NORMAL
LYMPHOCYTES # BLD AUTO: 2 10E9/L (ref 0.8–5.3)
LYMPHOCYTES NFR BLD AUTO: 30.4 %
MCH RBC QN AUTO: 30.6 PG (ref 26.5–33)
MCHC RBC AUTO-ENTMCNC: 34.5 G/DL (ref 31.5–36.5)
MCV RBC AUTO: 89 FL (ref 78–100)
MONOCYTES # BLD AUTO: 0.4 10E9/L (ref 0–1.3)
MONOCYTES NFR BLD AUTO: 6.6 %
NEUTROPHILS # BLD AUTO: 3.9 10E9/L (ref 1.6–8.3)
NEUTROPHILS NFR BLD AUTO: 59.4 %
NRBC # BLD AUTO: 0 10*3/UL
NRBC BLD AUTO-RTO: 0 /100
PLATELET # BLD AUTO: 250 10E9/L (ref 150–450)
POTASSIUM SERPL-SCNC: 3.7 MMOL/L (ref 3.4–5.3)
RBC # BLD AUTO: 4.08 10E12/L (ref 3.8–5.2)
SODIUM SERPL-SCNC: 141 MMOL/L (ref 133–144)
TROPONIN I SERPL-MCNC: <0.015 UG/L (ref 0–0.04)
TROPONIN I SERPL-MCNC: <0.015 UG/L (ref 0–0.04)
WBC # BLD AUTO: 6.5 10E9/L (ref 4–11)

## 2020-03-24 PROCEDURE — 71045 X-RAY EXAM CHEST 1 VIEW: CPT

## 2020-03-24 PROCEDURE — 99223 1ST HOSP IP/OBS HIGH 75: CPT | Mod: AI | Performed by: HOSPITALIST

## 2020-03-24 PROCEDURE — 99285 EMERGENCY DEPT VISIT HI MDM: CPT | Mod: 25

## 2020-03-24 PROCEDURE — 96374 THER/PROPH/DIAG INJ IV PUSH: CPT

## 2020-03-24 PROCEDURE — 25800030 ZZH RX IP 258 OP 636: Performed by: HOSPITALIST

## 2020-03-24 PROCEDURE — 80048 BASIC METABOLIC PNL TOTAL CA: CPT | Performed by: EMERGENCY MEDICINE

## 2020-03-24 PROCEDURE — 84484 ASSAY OF TROPONIN QUANT: CPT | Performed by: EMERGENCY MEDICINE

## 2020-03-24 PROCEDURE — 21000001 ZZH R&B HEART CARE

## 2020-03-24 PROCEDURE — 84484 ASSAY OF TROPONIN QUANT: CPT | Performed by: HOSPITALIST

## 2020-03-24 PROCEDURE — 85025 COMPLETE CBC W/AUTO DIFF WBC: CPT | Performed by: EMERGENCY MEDICINE

## 2020-03-24 PROCEDURE — 36415 COLL VENOUS BLD VENIPUNCTURE: CPT | Performed by: HOSPITALIST

## 2020-03-24 PROCEDURE — 25000128 H RX IP 250 OP 636: Performed by: EMERGENCY MEDICINE

## 2020-03-24 PROCEDURE — 93005 ELECTROCARDIOGRAM TRACING: CPT

## 2020-03-24 PROCEDURE — 25000132 ZZH RX MED GY IP 250 OP 250 PS 637: Performed by: HOSPITALIST

## 2020-03-24 PROCEDURE — 85379 FIBRIN DEGRADATION QUANT: CPT | Performed by: EMERGENCY MEDICINE

## 2020-03-24 RX ORDER — ROSUVASTATIN CALCIUM 20 MG/1
20 TABLET, COATED ORAL AT BEDTIME
Status: DISCONTINUED | OUTPATIENT
Start: 2020-03-24 | End: 2020-03-25

## 2020-03-24 RX ORDER — NITROGLYCERIN 0.4 MG/1
0.4 TABLET SUBLINGUAL EVERY 5 MIN PRN
Status: DISCONTINUED | OUTPATIENT
Start: 2020-03-24 | End: 2020-03-27 | Stop reason: HOSPADM

## 2020-03-24 RX ORDER — ACETAMINOPHEN 325 MG/1
650 TABLET ORAL EVERY 4 HOURS PRN
Status: DISCONTINUED | OUTPATIENT
Start: 2020-03-24 | End: 2020-03-27 | Stop reason: HOSPADM

## 2020-03-24 RX ORDER — KETOROLAC TROMETHAMINE 15 MG/ML
15 INJECTION, SOLUTION INTRAMUSCULAR; INTRAVENOUS ONCE
Status: COMPLETED | OUTPATIENT
Start: 2020-03-24 | End: 2020-03-24

## 2020-03-24 RX ORDER — LIDOCAINE 40 MG/G
CREAM TOPICAL
Status: DISCONTINUED | OUTPATIENT
Start: 2020-03-24 | End: 2020-03-27 | Stop reason: HOSPADM

## 2020-03-24 RX ORDER — ONDANSETRON 2 MG/ML
4 INJECTION INTRAMUSCULAR; INTRAVENOUS EVERY 6 HOURS PRN
Status: DISCONTINUED | OUTPATIENT
Start: 2020-03-24 | End: 2020-03-27 | Stop reason: HOSPADM

## 2020-03-24 RX ORDER — LEVOTHYROXINE SODIUM 75 UG/1
75 TABLET ORAL
Status: DISCONTINUED | OUTPATIENT
Start: 2020-03-25 | End: 2020-03-27 | Stop reason: HOSPADM

## 2020-03-24 RX ORDER — ALUMINA, MAGNESIA, AND SIMETHICONE 2400; 2400; 240 MG/30ML; MG/30ML; MG/30ML
30 SUSPENSION ORAL EVERY 4 HOURS PRN
Status: DISCONTINUED | OUTPATIENT
Start: 2020-03-24 | End: 2020-03-27 | Stop reason: HOSPADM

## 2020-03-24 RX ORDER — ACETAMINOPHEN 650 MG/1
650 SUPPOSITORY RECTAL EVERY 4 HOURS PRN
Status: DISCONTINUED | OUTPATIENT
Start: 2020-03-24 | End: 2020-03-27 | Stop reason: HOSPADM

## 2020-03-24 RX ORDER — NALOXONE HYDROCHLORIDE 0.4 MG/ML
.1-.4 INJECTION, SOLUTION INTRAMUSCULAR; INTRAVENOUS; SUBCUTANEOUS
Status: DISCONTINUED | OUTPATIENT
Start: 2020-03-24 | End: 2020-03-27 | Stop reason: HOSPADM

## 2020-03-24 RX ORDER — ONDANSETRON 4 MG/1
4 TABLET, ORALLY DISINTEGRATING ORAL EVERY 6 HOURS PRN
Status: DISCONTINUED | OUTPATIENT
Start: 2020-03-24 | End: 2020-03-27 | Stop reason: HOSPADM

## 2020-03-24 RX ORDER — SODIUM CHLORIDE 9 MG/ML
INJECTION, SOLUTION INTRAVENOUS CONTINUOUS
Status: DISCONTINUED | OUTPATIENT
Start: 2020-03-24 | End: 2020-03-25

## 2020-03-24 RX ADMIN — SODIUM CHLORIDE: 9 INJECTION, SOLUTION INTRAVENOUS at 23:25

## 2020-03-24 RX ADMIN — ROSUVASTATIN CALCIUM 20 MG: 20 TABLET, FILM COATED ORAL at 21:22

## 2020-03-24 RX ADMIN — KETOROLAC TROMETHAMINE 15 MG: 15 INJECTION, SOLUTION INTRAMUSCULAR; INTRAVENOUS at 18:32

## 2020-03-24 ASSESSMENT — ACTIVITIES OF DAILY LIVING (ADL)
AMBULATION: 0-->INDEPENDENT
DRESS: 0-->INDEPENDENT
BATHING: 0-->INDEPENDENT
COGNITION: 0 - NO COGNITION ISSUES REPORTED
SWALLOWING: 0-->SWALLOWS FOODS/LIQUIDS WITHOUT DIFFICULTY
FALL_HISTORY_WITHIN_LAST_SIX_MONTHS: NO
TRANSFERRING: 0-->INDEPENDENT
RETIRED_EATING: 0-->INDEPENDENT
TOILETING: 0-->INDEPENDENT
RETIRED_COMMUNICATION: 0-->UNDERSTANDS/COMMUNICATES WITHOUT DIFFICULTY

## 2020-03-24 ASSESSMENT — ENCOUNTER SYMPTOMS
NAUSEA: 1
HEADACHES: 1
SORE THROAT: 1
CHEST TIGHTNESS: 1
BACK PAIN: 1
FEVER: 0
COUGH: 1

## 2020-03-24 ASSESSMENT — MIFFLIN-ST. JEOR
SCORE: 1120.09
SCORE: 1123.27

## 2020-03-24 NOTE — ED NOTES
"[facility]  ED Nurse Handoff Report    ED Chief complaint: Chest Pain      ED Diagnosis:   Final diagnoses:   Acute chest pain       Code Status: Full Code    Allergies:   Allergies   Allergen Reactions     Dust Mites      Mold      No Known Drug Allergies      Seasonal Allergies      Adhesive Tape Rash       Patient Story: Pt having CP that she woke with today. Worse with deep breathing. Unable to get angiocath done that was previously scheduled. Given 324 asa and 2 nitroglycerin in route  Focused Assessment:  Heart sounds wnl, lung sounds clear,     Treatments and/or interventions provided: chest xray, toradol, monitor vs  Patient's response to treatments and/or interventions: vss    To be done/followed up on inpatient unit:  admit for cardiologist to do angiocath in am. To be NPO after midnight.     Does this patient have any cognitive concerns?: none    Activity level - Baseline/Home:  Independent  Activity Level - Current:   Independent    Patient's Preferred language: English   Needed?: No    Isolation: None  Infection: Not Applicable  Bariatric?: No    Vital Signs:   Vitals:    03/24/20 1655 03/24/20 1800   BP: (!) 117/95 126/81   Pulse: 69 64   Resp: 18    Temp: 98.4  F (36.9  C)    TempSrc: Oral    SpO2: 98% 100%   Weight: 62.6 kg (138 lb)    Height: 1.6 m (5' 3\")        Cardiac Rhythm:Cardiac Rhythm: Normal sinus rhythm    Was the PSS-3 completed:   Yes  What interventions are required if any?               Family Comments: none  OBS brochure/video discussed/provided to patient/family: Yes              Name of person given brochure if not patient: n/a              Relationship to patient: n/a    For the majority of the shift this patient's behavior was Green.   Behavioral interventions performed were n/a.    ED NURSE PHONE NUMBER:        "

## 2020-03-24 NOTE — ED TRIAGE NOTES
Pt BIBA from home for substernal CP that started this morning when she woke up. Pt given 324 asa and 2 nitroglycerin by EMS. Pt reports that pain has gotten somewhat better with meds.   Pt was supposed to have an angiogram that was cancelled due to the current virus. Pt reporting that she has 75% of one of her main heart vessels blocked. Pt denies any other symptoms.

## 2020-03-24 NOTE — ED PROVIDER NOTES
History     Chief Complaint:  Chest Pain      The history is provided by the patient.      Renea Lerma is a 69 year old female with a history of aortic root dilation, thoracic aortic aneurism, and hypercholesterolemia who presents via EMS for evaluation of chest pain. The patient an angiogram scheduled to check for the possibility of stent placement after a 70% occlusion of the aorta which was cancelled due to Covid precautions. For the past three days, she had cold/flu symptoms including sore throat, nausea, and yellow sputum which resolved today. She was also experiencing chest pain/tightening and a headache for the past three days which intensified this morning and has been constant all day. She called her cardiologist who recommended she call an ambulance. She was given  324 mg of aspirin and two nitroglycerin tablets en route to the emergency department. The chest pain is worse with deep breathing and she reports back pain and a cough. She denies fever and blood in the phlegm. She also denies known contact with the Covid virus.    Allergies:  Dust Mites  Mold  Seasonal Allergies  Adhesive Tape      Medications:    Aspirin  81mg   Vivelle-Dot patch   Gabapentin topical gel   Levothyroxine   Rosuvastatin      Past Medical History:    Aortic root dilatation  Asthma  BCC  Chronic hoarseness   Coronary artery disease    Dysphagia   Elevated coronary artery calcium score   Endometrial cancer  Gastroesophageal reflux disease   Hormone replacement therapy   Hyperlipidemia    Hypothyroidism   Nonrheumatic aortic valve insufficiency   Nutcracker esophagus   Schatzki's ring of distal esophagus   Shoulder arthritis   Thoracic aortic aneurysm   Vitamin D deficiency    Past Surgical History:    Biopsy  Vaginal hysterectomy  Colonoscopy x2  Tonsillectomy     Family History:    Father - diabetes     Social History:  Smoking Status: never  Smokeless Tobacco: never  Alcohol Use: yes    Marital Status:  Single      Review  "of Systems   Constitutional: Negative for fever.   HENT: Positive for sore throat (resolved).    Respiratory: Positive for cough and chest tightness.    Cardiovascular: Positive for chest pain.   Gastrointestinal: Positive for nausea (resolved).   Musculoskeletal: Positive for back pain.   Neurological: Positive for headaches.   All other systems reviewed and are negative.        Physical Exam     Patient Vitals for the past 24 hrs:   BP Temp Temp src Pulse Resp SpO2 Height Weight   03/24/20 1800 126/81 -- -- 64 -- 100 % -- --   03/24/20 1655 (!) 117/95 98.4  F (36.9  C) Oral 69 18 98 % 1.6 m (5' 3\") 62.6 kg (138 lb)       Physical Exam  Vitals signs reviewed.   Eyes:      Pupils: Pupils are equal, round, and reactive to light.   Neck:      Musculoskeletal: Normal range of motion.   Cardiovascular:      Rate and Rhythm: Normal rate and regular rhythm.      Heart sounds: Normal heart sounds.   Pulmonary:      Effort: Pulmonary effort is normal.      Breath sounds: Normal breath sounds.   Abdominal:      General: Bowel sounds are normal.      Palpations: Abdomen is soft.   Musculoskeletal: Normal range of motion.   Skin:     General: Skin is warm.      Capillary Refill: Capillary refill takes less than 2 seconds.   Neurological:      General: No focal deficit present.      Mental Status: She is alert.   Psychiatric:         Mood and Affect: Mood is anxious.         Emergency Department Course     ECG:  Indication: Chest pain   Time: 1658  Vent. Rate 65 bpm. MD interval 140. QRS duration 92. QT/QTc 430/447. P-R-T axis 29 46 54.  Sinus rhythm. Normal ECG. No significant change compared to ECG dated 11/14/07. Read time: 1658      Imaging:  Radiology findings were communicated with the patient who voiced understanding of the findings.    XR chest port 1 view:  Negative chest. Lungs clear, as per radiology.      Laboratory:  Laboratory findings were communicated with the patient who voiced understanding of the " findings.    CBC: WBC: 6.5, HGB: 12.5, PLT: 250    BMP: Glucose 93, all WNL (Creatinine: 0.70)    1707 Troponin: <0.015     D dimer: 0.4     Procedures:  None    Interventions:  1832 Toradol, 15 mg, IV    Emergency Department Course:  Past medical records, nursing notes, and vitals reviewed.    1705 I performed an exam of the patient as documented above.     EKG obtained in the ED, see results above.   IV was inserted and blood was drawn for laboratory testing, results above.  The patient was sent for a chest XR while in the emergency department, results above.     1749 I consulted with Cardiology, regarding the patient's history and presentation here in the emergency department.   1754 I rechecked the patient and discussed the results of her workup thus far. I discussed admission for observation.  1828 I consulted with Dr. Austin of the hospitalist services who is in agreement to accept the patient for admission.   1826 Patient rechecked and updated.     Findings and plan explained to the Patient who consents to admission. Discussed the patient with Dr. Austin, who will admit the patient to a [facility] observation bed for further monitoring, evaluation, and treatment.    I personally reviewed the laboratory and imaging results with the patient and answered all related questions prior to admission.     Impression & Plan     Medical Decision Making:  Pt presents with vague chest pain in the setting of cough and other symptoms including headache. Doubt her active symptoms are related to CAD, and ekg normal and troponin negative. Care discussed with Dr. Rosen, as concern is CT calcium score and suspcious mid LAD narrowing. Due to age and female, could present atypically. Decided due to COVID epidemic, to admit on observation and complete coronary anatomy study by angiogram in the hospital. Pt given aspirin by ems, due to normal ekg and negative troponin, will hold off on heparin until cardiology consultation  obtained.        Diagnosis:    ICD-10-CM    1. Acute chest pain  R07.9        Disposition:  Admitted to a [facility] observation bed under the care of Dr. Austin.    Scribe Disclosure:  ABA, Daya Delgado, am serving as a scribe at 5:05 PM on 3/24/2020 to document services personally performed by Michael Camarena MD based on my observations and the provider's statements to me.      EMERGENCY DEPARTMENT       Michael Camarena MD  03/26/20 9230

## 2020-03-24 NOTE — TELEPHONE ENCOUNTER
Patient called stating she has been feeling sick for the last few days, and was supposed to have an angiogram 3/23/20 but was cancelled. Patient states she has had constant chest pressure with tightness at rest, a severe headache, nausea, and a burning sore throat. Patient states she thought perhaps it was a chest cold but she took the [facility] test and was advised to go to the ED. Patient states she has spoken to her PCP clinic and they have advised she go to the ED. RN advised that due to her active symptoms, she should call 911 to take her to the ED for an evaluation. Patient verbalized agreement and understanding. Patient states she will get dressed and make the call.

## 2020-03-25 LAB — TROPONIN I SERPL-MCNC: <0.015 UG/L (ref 0–0.04)

## 2020-03-25 PROCEDURE — 25000132 ZZH RX MED GY IP 250 OP 250 PS 637: Performed by: INTERNAL MEDICINE

## 2020-03-25 PROCEDURE — U0001 2019-NCOV DIAGNOSTIC P: HCPCS | Performed by: HOSPITALIST

## 2020-03-25 PROCEDURE — 99221 1ST HOSP IP/OBS SF/LOW 40: CPT | Performed by: INTERNAL MEDICINE

## 2020-03-25 PROCEDURE — 25000132 ZZH RX MED GY IP 250 OP 250 PS 637: Performed by: HOSPITALIST

## 2020-03-25 PROCEDURE — 84484 ASSAY OF TROPONIN QUANT: CPT | Performed by: HOSPITALIST

## 2020-03-25 PROCEDURE — 99232 SBSQ HOSP IP/OBS MODERATE 35: CPT | Performed by: INTERNAL MEDICINE

## 2020-03-25 PROCEDURE — 12000000 ZZH R&B MED SURG/OB

## 2020-03-25 PROCEDURE — 36415 COLL VENOUS BLD VENIPUNCTURE: CPT | Performed by: HOSPITALIST

## 2020-03-25 RX ORDER — ASPIRIN 81 MG/1
81 TABLET ORAL DAILY
Status: DISCONTINUED | OUTPATIENT
Start: 2020-03-25 | End: 2020-03-27 | Stop reason: HOSPADM

## 2020-03-25 RX ORDER — ROSUVASTATIN CALCIUM 20 MG/1
40 TABLET, COATED ORAL AT BEDTIME
Status: DISCONTINUED | OUTPATIENT
Start: 2020-03-25 | End: 2020-03-27 | Stop reason: HOSPADM

## 2020-03-25 RX ORDER — ALBUTEROL SULFATE 90 UG/1
2 AEROSOL, METERED RESPIRATORY (INHALATION) EVERY 6 HOURS PRN
Status: DISCONTINUED | OUTPATIENT
Start: 2020-03-25 | End: 2020-03-27 | Stop reason: HOSPADM

## 2020-03-25 RX ADMIN — METOPROLOL TARTRATE 12.5 MG: 25 TABLET, FILM COATED ORAL at 19:36

## 2020-03-25 RX ADMIN — ROSUVASTATIN CALCIUM 40 MG: 20 TABLET, FILM COATED ORAL at 21:12

## 2020-03-25 RX ADMIN — Medication 3 MG: at 21:12

## 2020-03-25 RX ADMIN — LEVOTHYROXINE SODIUM 75 MCG: 75 TABLET ORAL at 08:12

## 2020-03-25 RX ADMIN — ASPIRIN 81 MG: 81 TABLET, DELAYED RELEASE ORAL at 08:12

## 2020-03-25 RX ADMIN — METOPROLOL TARTRATE 12.5 MG: 25 TABLET, FILM COATED ORAL at 10:57

## 2020-03-25 ASSESSMENT — MIFFLIN-ST. JEOR: SCORE: 1125.13

## 2020-03-25 ASSESSMENT — ACTIVITIES OF DAILY LIVING (ADL)
ADLS_ACUITY_SCORE: 11

## 2020-03-25 NOTE — CONSULTS
[facility]  Cardiology Consultation     Date of Admission:  3/24/2020    Primary Care Physician   Caprice Lares    Reason for Consult   Reason for consult: I was asked to evaluate this patient for chest pain and known coronary disease, hyperlipidemia and mild asending aortic aneurysm.    History of Present Illness   Renea Lerma is a 69 year old woman who follows with my partner Dr. Kathia Casey.  She has a 2 to 3-month history of decreasing exercise tolerance and worsening shortness of breath.  Stress echo from 2017 is normal.  Echocardiogram from December 2019 also demonstrates normal left ventricular function.  She had a CT angiogram from December 27 that came back with calcium score putting in the 73rd percentile for risk moderate mid LAD stenosis and a sending aorta 4.3 x 4.2.  Patient was scheduled for coronary angiography this past Monday but canceled due to elective procedure in the Covidid era.  On Monday she started developing symptoms of an upper respiratory infection with dyspnea, subjective fevers and chills and a substernal chest pressure sensation.  She describes worse with deep inspiration.  Worse when lying down.  Troponins x3 have been negative.  EKG is normal.  Patient sought medical attention in emergency room.  It is my recommendation patient be admitted to rule out myocardial infarction and proceed to coronary angiography as appropriate.  Patient is undergoing Covid evaluation at this time.    Assessment & Plan   Renea Lerma is a 69 year old female who was admitted on 3/24/2020 with a chest pain syndrome most consistent with a viral syndrome.  I do not think this is angina.  Opponents are negative x3.  EKG is normal.  Patient could wait to her Covid status is clarified and then we could proceed with coronary angiography.  Patient is okay to eat at this time.    Atypical chest pain consistent with an inflammatory process.  Patient has been started on ketorolac.  Patient is  undergoing Covid evaluation.    Coronary disease.  We will proceed with coronary angiography as appropriate.  I discussed the risks, benefits and alternatives with the patient.  She appears to understand desires to proceed.  Continue aspirin.  I will start metoprolol.    Fasting lipid profile from February 20 is total cholesterol 242, HDL 82,  and triglycerides of 160.  I will intensify rosuvastatin from 20 mg to 40 mg.  She will need a fasting lipid profile and ALT after 4 to 6 weeks.    Donaldo Rosen MD    Patient Active Problem List   Diagnosis     Esophageal reflux     Mild intermittent asthma     Hypothyroidism     Fibrocystic breast disease, unspecified laterality     Nonrheumatic aortic valve insufficiency     Thoracic aortic aneurysm without rupture (H)     Mixed hyperlipidemia     Shoulder arthritis     Vitamin D deficiency     Lung nodule     Dyspnea on exertion     Nutcracker esophagus     Schatzki's ring of distal esophagus     Aortic root dilatation (H)     Chronic hoarseness     Dysphagia     Coronary artery disease involving native heart without angina pectoris, unspecified vessel or lesion type     Elevated coronary artery calcium score     ACS (acute coronary syndrome) (H)       Past Medical History   I have reviewed this patient's medical history and updated it with pertinent information if needed.   Past Medical History:   Diagnosis Date     Aortic root dilatation (H)      Asthma      BCC (basal cell carcinoma of skin) 2010    rt shoulder     Chronic hoarseness     congential vocal chord closure issue     Dysphagia     esophageal dilatation per [facility] 2/2019     Elevated coronary artery calcium score     CTa calcium score total 93.5 / 73rd percentile     Endometrial ca (H)     stage 1     Family hx of colon cancer      GERD (gastroesophageal reflux disease)      Hormone replacement therapy      Hyperlipidemia LDL goal <130 11/4/2016     Hypothyroidism      Nonrheumatic aortic valve  insufficiency 11/4/2016     Nutcracker esophagus      Schatzki's ring of distal esophagus      Shoulder arthritis     rt     Thoracic aortic aneurysm without rupture (H)     4.3 cm     Vitamin D deficiency 11/4/2016       Past Surgical History   I have reviewed this patient's surgical history and updated it with pertinent information if needed.  Past Surgical History:   Procedure Laterality Date     BIOPSY       C VAG HYST,UTERUS >250 GMS,REM TUBE/OVARY  2000     COLONOSCOPY       COLONOSCOPY N/A 12/12/2016    Procedure: COLONOSCOPY;  Surgeon: Manjinder Vera MD;  Location:  GI     TONSILLECTOMY         Prior to Admission Medications   Prior to Admission Medications   Prescriptions Last Dose Informant Patient Reported? Taking?   albuterol (PROAIR HFA/PROVENTIL HFA/VENTOLIN HFA) 108 (90 Base) MCG/ACT inhaler More than a month at Unknown time  No No   Sig: Inhale 2 puffs into the lungs every 6 hours as needed for shortness of breath / dyspnea or wheezing   aspirin (ASA) 81 MG EC tablet 3/24/2020 at Unknown time  Yes Yes   Sig: Take 1 tablet (81 mg) by mouth daily   cholecalciferol (VITAMIN D3) 5000 units (125 mcg) capsule 3/24/2020 at Unknown time  Yes Yes   Sig: Take 5,000 Units by mouth once a week   estradiol (VIVELLE-DOT) 0.0375 MG/24HR BIW patch 3/24/2020 at Unknown time  No Yes   Sig: Place 1 patch onto the skin twice a week   Patient taking differently: Place 1 patch onto the skin once a week    fluticasone (FLONASE) 50 MCG/ACT spray More than a month at Unknown time  Yes No   Sig: Spray 1 spray into both nostrils daily as needed    levothyroxine (SYNTHROID/LEVOTHROID) 75 MCG tablet 3/24/2020 at Unknown time  No Yes   Sig: Take 1 tablet (75 mcg) by mouth daily   multivitamin, therapeutic with minerals (MULTI-VITAMIN) TABS tablet 3/24/2020 at Unknown time  No Yes   Sig: Take 1 tablet by mouth daily   rosuvastatin (CRESTOR) 20 MG tablet 3/23/2020 at Unknown time  No Yes   Sig: Take 1 tablet (20 mg) by mouth  "daily at bedtime      Facility-Administered Medications: None     Current Facility-Administered Medications   Medication Dose Route Frequency     aspirin  81 mg Oral Daily     levothyroxine  75 mcg Oral QAM AC     metoprolol tartrate  12.5 mg Oral BID     rosuvastatin  20 mg Oral At Bedtime     sodium chloride (PF)  3 mL Intracatheter Q8H     Current Facility-Administered Medications   Medication Last Rate     - MEDICATION INSTRUCTIONS -       - MEDICATION INSTRUCTIONS -       ACE/ARB/ARNI NOT PRESCRIBED       ASPIRIN NOT PRESCRIBED       sodium chloride 75 mL/hr at 03/25/20 0010     Allergies   Allergies   Allergen Reactions     Dust Mites      Mold      No Known Drug Allergies      Seasonal Allergies      Adhesive Tape Rash       Social History    reports that she has never smoked. She has never used smokeless tobacco. She reports current alcohol use. She reports that she does not use drugs.    Family History   Family History   Problem Relation Age of Onset     Diabetes Father        Review of Systems   The comprehensive 10 point Review of Systems is negative other than noted in the HPI or here.     Physical Exam   Vital Signs with Ranges  Temp:  [97.6  F (36.4  C)-98.4  F (36.9  C)] 97.7  F (36.5  C)  Pulse:  [62-75] 75  Heart Rate:  [68-75] 75  Resp:  [16-18] 16  BP: ()/(50-95) 114/62  SpO2:  [96 %-100 %] 96 %  Wt Readings from Last 4 Encounters:   03/25/20 63.1 kg (139 lb 1.8 oz)   03/05/20 64.5 kg (142 lb 4.8 oz)   02/20/20 62.6 kg (138 lb)   11/11/19 63 kg (139 lb)     I/O last 3 completed shifts:  In: 493.75 [I.V.:493.75]  Out: -       Vitals: /62 (BP Location: Right arm)   Pulse 75   Temp 97.7  F (36.5  C) (Oral)   Resp 16   Ht 1.6 m (5' 3\")   Wt 63.1 kg (139 lb 1.8 oz)   SpO2 96%   BMI 24.64 kg/m      No physical exam due to Covid status    Recent Labs   Lab 03/25/20  0757 03/24/20  2101 03/24/20  1707   TROPI <0.015 <0.015 <0.015       Recent Labs   Lab 03/25/20  0757 03/24/20 2101 " 03/24/20  1707   WBC  --   --  6.5   HGB  --   --  12.5   MCV  --   --  89   PLT  --   --  250   NA  --   --  141   POTASSIUM  --   --  3.7   CHLORIDE  --   --  109   CO2  --   --  22   BUN  --   --  20   CR  --   --  0.70   GFRESTIMATED  --   --  88   GFRESTBLACK  --   --  >90   ANIONGAP  --   --  10   HAWK  --   --  9.1   GLC  --   --  93   TROPI <0.015 <0.015 <0.015     Recent Labs   Lab Test 02/20/20  0952 11/04/19  0845  04/17/12   CHOL 242* 241*   < > 211*   HDL 82 83   < > 67   * 140*   < > 131   TRIG 102 91   < > 65   CHOLHDLRATIO  --   --   --  3.15    < > = values in this interval not displayed.     Recent Labs   Lab 03/24/20  1707   WBC 6.5   HGB 12.5   HCT 36.2   MCV 89        No results for input(s): PH, PHV, PO2, PO2V, SAT, PCO2, PCO2V, HCO3, HCO3V in the last 168 hours.  No results for input(s): NTBNPI, NTBNP in the last 168 hours.  Recent Labs   Lab 03/24/20  1707   DD 0.4     No results for input(s): SED, CRP in the last 168 hours.  Recent Labs   Lab 03/24/20  1707        No results for input(s): TSH in the last 168 hours.  No results for input(s): COLOR, APPEARANCE, URINEGLC, URINEBILI, URINEKETONE, SG, UBLD, URINEPH, PROTEIN, UROBILINOGEN, NITRITE, LEUKEST, RBCU, WBCU in the last 168 hours.    Imaging:  Recent Results (from the past 48 hour(s))   XR Chest Port 1 View    Narrative    CHEST ONE VIEW PORTABLE   3/24/2020 6:04 PM     HISTORY:  Cough. Pleuritic chest pain.    COMPARISON: 6/14/2017.      Impression    IMPRESSION: Negative chest. Lungs clear.    OMKAR PORTILLO MD       Echo:  No results found for this or any previous visit (from the past 4320 hour(s)).

## 2020-03-25 NOTE — PLAN OF CARE
"A&O x 4. Patient c/o 3/10 CP - pt described it as \"tight and sore\" and stated it worsened when taking deep breaths. CP remain stable throughout her time on the unit and did not require intervention. VSS, on RA. Up Ind in room Tele: SR. NS running at 75 ml/hr. COVID rule out. Plan for angiogram when COVID results come back. Pt was transferred to station 66 at 0000.     "

## 2020-03-25 NOTE — PHARMACY-ADMISSION MEDICATION HISTORY
Pharmacy Medication History  Admission medication history interview status for the 3/24/2020  admission is complete. See EPIC admission navigator for prior to admission medications     Medication history sources: Patient and Surescripts  Medication history source reliability: Good  Adherence assessment: Good    Significant changes made to the medication list:      Additional medication history information:       Medication reconciliation completed by provider prior to medication history? Yes    Time spent in this activity: 15 min      Prior to Admission medications    Medication Sig Last Dose Taking? Auth Provider   aspirin (ASA) 81 MG EC tablet Take 1 tablet (81 mg) by mouth daily 3/24/2020 at Unknown time Yes Kathia Casey MD   cholecalciferol (VITAMIN D3) 5000 units (125 mcg) capsule Take 5,000 Units by mouth once a week 3/24/2020 at Unknown time Yes Unknown, Entered By History   estradiol (VIVELLE-DOT) 0.0375 MG/24HR BIW patch Place 1 patch onto the skin twice a week  Patient taking differently: Place 1 patch onto the skin once a week  3/24/2020 at Unknown time Yes Caprice Lares MD   levothyroxine (SYNTHROID/LEVOTHROID) 75 MCG tablet Take 1 tablet (75 mcg) by mouth daily 3/24/2020 at Unknown time Yes Caprice Lares MD   multivitamin, therapeutic with minerals (MULTI-VITAMIN) TABS tablet Take 1 tablet by mouth daily 3/24/2020 at Unknown time Yes Caprice Lares MD   rosuvastatin (CRESTOR) 20 MG tablet Take 1 tablet (20 mg) by mouth daily at bedtime 3/23/2020 at Unknown time Yes Kathia Casey MD   albuterol (PROAIR HFA/PROVENTIL HFA/VENTOLIN HFA) 108 (90 Base) MCG/ACT inhaler Inhale 2 puffs into the lungs every 6 hours as needed for shortness of breath / dyspnea or wheezing More than a month at Unknown time  Caprice Lares MD   fluticasone (FLONASE) 50 MCG/ACT spray Spray 1 spray into both nostrils daily as needed  More than a month at Unknown time  Reported, Patient

## 2020-03-25 NOTE — H&P
[facility]     History and Physical - Hospitalist Service         Name: Renea Lerma    MRN: [id number]  YOB: 1950    Age: 69 year old  Date of Admission:  3/24/2020  Physician: Rosalba Austin MD  Text Page        Assessment & Plan   Renea Lerma is a 69 year old female with PMH... who presents on 03/24/20 with substernal chest pressure and several days of URI symptoms, dyspnea, and subjective fever and chills.    Chest pain: troponins negative, EKG non ischemic and pain atypical for ACS. However, pt has known LAD lesion. Was supposed to get elective angio yesterday but it was canceled per protocol with COVID. Dr Rosen from cardiology was contacted by ED and asked that pt be admitted for angio tomorrow.  - NPO after midnight, though if angio is to be delayed to wait for COVID results, please order pt diet.    - IVF overnight while NPO but these can be discontinued if pt gets diet and tolerates po tomorrow  - ASA and SL nitroglycerin x 2 given by EMS  - continue PTA ASA daily and statin at bedtime  - will defer whether to start beta blocker to cardiology  - recheck troponin in am    Respiratory and URI symptoms  Peripheral headache  Subjective fever and chills for several days (unable to take temp at home): CXR shows clear lungs.  - Pt placed on COVID precautions but COVID not sent by ED. Protocol clarified and pt will need COVID rule out if we are admitting her with precautions and she has relevant symptoms. COVID sent on arrival to medical floor.  - symptomatic management  - PTA albuterol inhaler prn    Hypothyroid: continue PTA synthroid    Dyslipidemia: continue statin as above    History of aortic root dilation and thoracic aortic aneurysm  - outpatient monitoring    Diet: Orders Placed This Encounter      NPO for Medical/Clinical Reasons Except for: Meds      Regular Diet Adult     DVT Prophylaxis: Low Risk/Ambulatory with no VTE prophylaxis indicated  Code Status:  Full Code  Pastor Catheter: not present        Disposition Plan    Expected discharge: 2 - 3 days, recommended to prior living arrangement once able to obtain angio. Unclear whether cardiology will want to wait for COVID rule out..    Rosalba Austin MD  03/24/2020 9:16 PM       Primary Care Physician   *Caprice Lares, [phone]    Chief Complaint   Chest pain and dyspnea    History is obtained from the patient.  I also spoke with the ER provider about the history.       History of Present Illness   Renea Lerma is a 69 year old female who presents with substernal chest pain since waking this morning. Non radiating and not worse on exertion. Constant all day. Does not improve with rest. Pt also reports 3 days of URI symptoms and SOB. She does endorse subjective fevers and significant episodes of chills but did not have a thermometer at home to measure temp. Symptoms started with a posterior HA that has persisted then progressed to sore throat and occasional nausea. She denies much of a cough. She is having nausea at the time of my exam and was unable to eat dinner given in ED. She felt she could eat some amrik crackers and that would help.    Pt was to have an angiogram yesterday that was canceled due to it being an elective procedure and new COVID protocol. Cardiologist felt that pt should be admitted for inpatient angio since she was having chest pain and wouldn't be able to reschedule outpatient procedure anytime soon.    Past Medical History    Past Medical History:   Diagnosis Date     Aortic root dilatation (H)      Asthma      BCC (basal cell carcinoma of skin) 2010    rt shoulder     Chronic hoarseness     congential vocal chord closure issue     Dysphagia     esophageal dilatation per [facility] 2/2019     Elevated coronary artery calcium score     CTa calcium score total 93.5 / 73rd percentile     Endometrial ca (H)     stage 1     Family hx of colon cancer      GERD (gastroesophageal reflux disease)       Hormone replacement therapy      Hyperlipidemia LDL goal <130 11/4/2016     Hypothyroidism      Nonrheumatic aortic valve insufficiency 11/4/2016     Nutcracker esophagus      Schatzki's ring of distal esophagus      Shoulder arthritis     rt     Thoracic aortic aneurysm without rupture (H)     4.3 cm     Vitamin D deficiency 11/4/2016         Past Surgical History   Past Surgical History:   Procedure Laterality Date     BIOPSY       C VAG HYST,UTERUS >250 GMS,REM TUBE/OVARY  2000     COLONOSCOPY       COLONOSCOPY N/A 12/12/2016    Procedure: COLONOSCOPY;  Surgeon: Manjinder Vera MD;  Location:  GI     TONSILLECTOMY          Prior to Admission Medications   Prior to Admission Medications   Prescriptions Last Dose Informant Patient Reported? Taking?   albuterol (PROAIR HFA/PROVENTIL HFA/VENTOLIN HFA) 108 (90 Base) MCG/ACT inhaler More than a month at Unknown time  No No   Sig: Inhale 2 puffs into the lungs every 6 hours as needed for shortness of breath / dyspnea or wheezing   aspirin (ASA) 81 MG EC tablet 3/24/2020 at Unknown time  Yes Yes   Sig: Take 1 tablet (81 mg) by mouth daily   cholecalciferol (VITAMIN D3) 5000 units (125 mcg) capsule 3/24/2020 at Unknown time  Yes Yes   Sig: Take 5,000 Units by mouth once a week   estradiol (VIVELLE-DOT) 0.0375 MG/24HR BIW patch 3/24/2020 at Unknown time  No Yes   Sig: Place 1 patch onto the skin twice a week   Patient taking differently: Place 1 patch onto the skin once a week    fluticasone (FLONASE) 50 MCG/ACT spray More than a month at Unknown time  Yes No   Sig: Spray 1 spray into both nostrils daily as needed    levothyroxine (SYNTHROID/LEVOTHROID) 75 MCG tablet 3/24/2020 at Unknown time  No Yes   Sig: Take 1 tablet (75 mcg) by mouth daily   multivitamin, therapeutic with minerals (MULTI-VITAMIN) TABS tablet 3/24/2020 at Unknown time  No Yes   Sig: Take 1 tablet by mouth daily   rosuvastatin (CRESTOR) 20 MG tablet 3/23/2020 at Unknown time  No Yes   Sig: Take 1  tablet (20 mg) by mouth daily at bedtime      Facility-Administered Medications: None     Allergies   Allergies   Allergen Reactions     Dust Mites      Mold      No Known Drug Allergies      Seasonal Allergies      Adhesive Tape Rash       Social History : Verified with pt. Never smoker. Rare alcohol.  Social History     Tobacco Use     Smoking status: Never Smoker     Smokeless tobacco: Never Used   Substance Use Topics     Alcohol use: Yes     Comment: 1 glass of wine per week     Social History     Social History Narrative    Single, 2 children       Family History   I have reviewed this patient's family history and updated it with pertinent information if needed.   Family History   Problem Relation Age of Onset     Diabetes Father    Alcohol abuse in one of her parents. No MI or stroke prior to old age in family.    Review of Systems   A Comprehensive greater than 10 system review of systems was carried out.  Pertinent positives and negatives are noted above.  Otherwise negative for contributory information.    Physical Exam   Temp: 98.3  F (36.8  C) Temp src: Oral BP: 131/50 Pulse: 69 Heart Rate: 68 Resp: 18 SpO2: 98 % O2 Device: None (Room air)    Vital Signs with Ranges  Temp:  [98.3  F (36.8  C)-98.4  F (36.9  C)] 98.3  F (36.8  C)  Pulse:  [64-69] 69  Heart Rate:  [68] 68  Resp:  [18] 18  BP: ()/(50-95) 131/50  SpO2:  [98 %-100 %] 98 %  138 lbs 11.2 oz    GEN:  Alert, oriented x 3, appears comfortable, no overt distress  HEENT:  Normocephalic/atraumatic, no scleral icterus, no nasal discharge, mouth moist.  CV:  Regular rate and rhythm, no murmur or JVD.  S1 + S2 noted, no S3 or S4.  LUNGS:  Clear to auscultation bilaterally without rales/rhonchi/wheezing/retractions.  Symmetric chest rise on inhalation noted.  ABD:  Active bowel sounds, soft, non-tender/non-distended.  No rebound/guarding/rigidity.  EXT:  No edema.  No cyanosis.  No joint synovitis noted.  SKIN:  Dry to touch, no exanthems noted in  the visualized areas.  NEURO:  Symmetric muscle strength, sensation to touch grossly intact.  Coordination symmetric on general exam.  No new focal deficits appreciated.    Data   Data reviewed today:  I personally reviewed the EKG tracing showing NSR. No ischemic changes..    Lab Results   Component Value Date    WBC 6.5 03/24/2020    HGB 12.5 03/24/2020    HCT 36.2 03/24/2020     03/24/2020     03/24/2020    POTASSIUM 3.7 03/24/2020    CHLORIDE 109 03/24/2020    CO2 22 03/24/2020    BUN 20 03/24/2020    CR 0.70 03/24/2020    GLC 93 03/24/2020    DD 0.4 03/24/2020    TROPONIN <0.04 11/13/2007    TROPI <0.015 03/24/2020    AST 21 11/04/2019    ALT 23 11/04/2019    ALKPHOS 67 11/04/2019    BILITOTAL 0.6 11/04/2019      Recent Labs   Lab 03/24/20  2101 03/24/20  1707   TROPI <0.015 <0.015       Recent Results (from the past 24 hour(s))   XR Chest Port 1 View    Narrative    CHEST ONE VIEW PORTABLE   3/24/2020 6:04 PM     HISTORY:  Cough. Pleuritic chest pain.    COMPARISON: 6/14/2017.      Impression    IMPRESSION: Negative chest. Lungs clear.    OMKAR PORTILLO MD

## 2020-03-25 NOTE — PROGRESS NOTES
[facility]    Hospitalist Progress Note    Assessment & Plan   Renea Lerma is a 69 year old female with PMHx of mild ascending aorta dilation, dyslipidemia, hypothyroidism, GERD, asthma, hx of Schatzki's ring and esophageal dilation who was admitted on 3/24/2020 for evaluation of constellation of symptoms including dyspnea, chest discomfort, subjective fever and chills.     Atypical Chest Pain  CAD  Underwent evaluation with CT coronary angiogram in 2/2020 for evaluation of dyspnea and decreased excersise tolerance and abnl findings on echo in 12/2019 (possible anomalous RCA; EF nl at that time). That showed a calcified plaque in involving the mid LAD with associated moderate stenosis, no significant disease elsewhere. Had been scheduled for further evaluation with outpatient angiogram on 3/23/20 but this was cancelled dt current COVID19 outbreak.    Endorsed complaints of substernal chest pain that he begin 3 days prior to presentation which intensified the day of admission. No radiation. No worsening with exertion. No improvement with rest. Had URI sx in the days preceding chest discomfort. In ED, VSS. EKG showed no acute ischemic changes. Initial trop neg. Given dose of Toradol x1. Presentation discussed with Dr. Rosen -- recommended admission to hospital with plans for coronary angiogram this stay. Serial trops remained neg. Suspect atypical chest pain    -- cont asa 81mg daily (started on admission) and rosuvastatin  -- metoprolol 12.5mg BID added this stay  -- cardiology following -- anticipate further evaluation with angiogram on 3/26 pending rule out of COVID19 infection (if she is positive for COVID19, angiogram will have to be postponed)    URI sx including dyspnea, subjective fevers/chills, headache  Endorsed constellation of URI sx including dyspnea, subjective fevers/chills, headache that began on 3/22. In ED, was afebrile and hemodynamically stable. CXR clear.   -- COVID19 PCR  sent at 0035 today -- keep in contact/droplet precautions until results known  -- supportive cares with Tylenol prn, albuterol inhaler prn    Dyslipidemia  Chronic and stable on rosuvastatin -- dose increased from 20mg to 40mg HS this stay    Hypothyroidism  Chronic and stable on levothyroxine    Hx of aortic root dilation and thoracic aortic aneurysm  Last echo in 12/2019 showed mildly dilated ascending aorta at 4.4cm. CTcoronary angiogram in 2/2020 confirmed these findings.   Monitoring. No concerns at this time    FEN: dc IVFs as no plans for angiogram today and okay'd for diet, lytes stable, keep NPO at midnight for possible angiogram tomorrow  DVT Prophylaxis: PCDs  Code Status: Full Code    Disposition: If COVID19+ will discharge home tomorrow and self quarantine, if COVID19- consider pursuing angiogram tomorrow.     Beth Fernandes    Interval History   Seen this afternoon. Resting comfortably. Feeling well overall. Denies cp/sob/cough, abd pain/n/v.     -Data reviewed today: I reviewed all new labs and imaging results over the last 24 hours. I personally reviewed no images or EKG's today.    Physical Exam   Temp: 97.3  F (36.3  C) Temp src: Oral BP: 110/59 Pulse: 68 Heart Rate: 68 Resp: 18 SpO2: 94 % O2 Device: None (Room air)    Vitals:    03/24/20 1655 03/24/20 2030 03/25/20 0010   Weight: 62.6 kg (138 lb) 62.9 kg (138 lb 11.2 oz) 63.1 kg (139 lb 1.8 oz)     Vital Signs with Ranges  Temp:  [97.3  F (36.3  C)-98.4  F (36.9  C)] 97.3  F (36.3  C)  Pulse:  [62-75] 68  Heart Rate:  [68-75] 68  Resp:  [16-18] 18  BP: ()/(50-95) 110/59  SpO2:  [94 %-100 %] 94 %  I/O last 3 completed shifts:  In: 493.75 [I.V.:493.75]  Out: -     Constitutional: Resting comfortably, alert and answering questions appropriately, NAD  Respiratory: CTAB, no wheeze/rales/rhonchi, no increased work of breathing  Cardiovascular: HRRR, no MGR, no LE edema  GI: S, NT, ND, +BS  Skin/Integumen: warm/dry  Other:      Medications      - MEDICATION INSTRUCTIONS -       - MEDICATION INSTRUCTIONS -       ACE/ARB/ARNI NOT PRESCRIBED       ASPIRIN NOT PRESCRIBED       sodium chloride 75 mL/hr at 03/25/20 0010       aspirin  81 mg Oral Daily     levothyroxine  75 mcg Oral QAM AC     metoprolol tartrate  12.5 mg Oral BID     rosuvastatin  40 mg Oral At Bedtime     sodium chloride (PF)  3 mL Intracatheter Q8H       Data   Recent Labs   Lab 03/25/20  0757 03/24/20  2101 03/24/20  1707   WBC  --   --  6.5   HGB  --   --  12.5   MCV  --   --  89   PLT  --   --  250   NA  --   --  141   POTASSIUM  --   --  3.7   CHLORIDE  --   --  109   CO2  --   --  22   BUN  --   --  20   CR  --   --  0.70   ANIONGAP  --   --  10   HAWK  --   --  9.1   GLC  --   --  93   TROPI <0.015 <0.015 <0.015       Recent Results (from the past 24 hour(s))   XR Chest Port 1 View    Narrative    CHEST ONE VIEW PORTABLE   3/24/2020 6:04 PM     HISTORY:  Cough. Pleuritic chest pain.    COMPARISON: 6/14/2017.      Impression    IMPRESSION: Negative chest. Lungs clear.    OMKAR PORTILLO MD

## 2020-03-25 NOTE — PLAN OF CARE
Patient is A/Ox4. VSS on RA. Tele SR. LS clear. DALEY. IVF at 75 infusing. Up independently. Chest pain 3/10, no changes overnight. NPO since midnight for possible angio today. Plan on continuing to monitor.

## 2020-03-25 NOTE — PLAN OF CARE
CR:  Orders received and chart reviewed. Per chart review, cards consult pending with possible angio. Will hold to allow for cards consult to be completed and POC to be established.

## 2020-03-25 NOTE — PROGRESS NOTES
RECEIVING UNIT ED HANDOFF REVIEW    ED Nurse Handoff Report was reviewed by: Shante Maza RN on March 24, 2020 at 7:12 PM       *report discussed with ED nurse via telephone too

## 2020-03-25 NOTE — PROVIDER NOTIFICATION
MD Notification    Notified Person: MD    Notified Person Name: Rosalba Austin    Notification Date/Time: 3/24/2020 21:00    Notification Interaction: Page    Purpose of Notification: [address] SC...D. Pt is requesting  COVID test. She has respiratory symptoms and chest pain, but is afebrile. Thank you. NATALIA Newman *[phone]    Orders Received:    Comments:

## 2020-03-26 PROCEDURE — 99232 SBSQ HOSP IP/OBS MODERATE 35: CPT | Performed by: INTERNAL MEDICINE

## 2020-03-26 PROCEDURE — 25000132 ZZH RX MED GY IP 250 OP 250 PS 637: Performed by: HOSPITALIST

## 2020-03-26 PROCEDURE — 12000000 ZZH R&B MED SURG/OB

## 2020-03-26 PROCEDURE — 25000132 ZZH RX MED GY IP 250 OP 250 PS 637: Performed by: INTERNAL MEDICINE

## 2020-03-26 RX ADMIN — Medication 3 MG: at 20:00

## 2020-03-26 RX ADMIN — METOPROLOL TARTRATE 12.5 MG: 25 TABLET, FILM COATED ORAL at 20:00

## 2020-03-26 RX ADMIN — ROSUVASTATIN CALCIUM 40 MG: 20 TABLET, FILM COATED ORAL at 20:00

## 2020-03-26 RX ADMIN — ASPIRIN 81 MG: 81 TABLET, DELAYED RELEASE ORAL at 08:35

## 2020-03-26 RX ADMIN — METOPROLOL TARTRATE 12.5 MG: 25 TABLET, FILM COATED ORAL at 08:35

## 2020-03-26 RX ADMIN — LEVOTHYROXINE SODIUM 75 MCG: 75 TABLET ORAL at 05:45

## 2020-03-26 ASSESSMENT — ACTIVITIES OF DAILY LIVING (ADL)
ADLS_ACUITY_SCORE: 11

## 2020-03-26 ASSESSMENT — MIFFLIN-ST. JEOR: SCORE: 1125.54

## 2020-03-26 NOTE — PROGRESS NOTES
[facility]    Hospitalist Progress Note    Assessment & Plan   Renea Lerma is a 69 year old female with PMHx of mild ascending aorta dilation, dyslipidemia, hypothyroidism, GERD, asthma, hx of Schatzki's ring and esophageal dilation who was admitted on 3/24/2020 for evaluation of constellation of symptoms including dyspnea, chest discomfort, subjective fever and chills.     Atypical Chest Pain  CAD  Underwent evaluation with CT coronary angiogram in 2/2020 for evaluation of dyspnea and decreased excersise tolerance and abnl findings on echo in 12/2019 (possible anomalous RCA; EF nl at that time). That showed a calcified plaque in involving the mid LAD with associated moderate stenosis, no significant disease elsewhere. Had been scheduled for further evaluation with outpatient angiogram on 3/23/20 but this was cancelled dt current COVID19 outbreak.    Endorsed complaints of substernal chest pain that he begin 3 days prior to presentation which intensified the day of admission. No radiation. No worsening with exertion. No improvement with rest. Had URI sx in the days preceding chest discomfort. In ED, VSS. EKG showed no acute ischemic changes. Initial trop neg. Given dose of Toradol x1. Presentation discussed with Dr. Rosen -- recommended admission to hospital with plans for coronary angiogram this stay. Serial trops remained neg. Suspect atypical chest pain    -- cont asa 81mg daily (started on admission) and rosuvastatin  -- metoprolol 12.5mg BID added this stay  -- cardiology following -- anticipate further evaluation with angiogram on 3/27 pending rule out of COVID19 infection (if she is positive for COVID19, angiogram will have to be postponed)    URI sx including dyspnea, subjective fevers/chills, headache  Endorsed constellation of URI sx including dyspnea, subjective fevers/chills, headache that began on 3/22. In ED, was afebrile and hemodynamically stable. CXR clear.   -- COVID19 PCR  sent at 0035 on 3/25 -- results still pending at the time of this note, keep in contact/droplet precautions until results known  -- supportive cares with Tylenol prn, albuterol inhaler prn    Dyslipidemia  Chronic and stable on rosuvastatin -- dose increased from 20mg to 40mg HS this stay    Hypothyroidism  Chronic and stable on levothyroxine    Hx of aortic root dilation and thoracic aortic aneurysm  Last echo in 12/2019 showed mildly dilated ascending aorta at 4.4cm. CTcoronary angiogram in 2/2020 confirmed these findings.   Monitoring. No concerns at this time    FEN: off IVFs, lytes stable, okay for an  DVT Prophylaxis: PCDs  Code Status: Full Code    Disposition: Initial plan was to pursue angiogram if COVID19 testing is neg and discharge home for self quarantine if positive with angiogram to be done at a later date.     Patient is now wanting to leave AMA -- prior to results of COVID testing and angiogram. Wants to talk to Dr. Rosen about timing of angiogram (if we know it won't be done and she can eat today, she may be amenable to staying) and whether it needs to be done this stay versus at a later date.     1600 Addendum:  Patient willing to stay tonight. Okay to eat/drink. Will keep NPO at midnight for possible angiogram in AM, pending results of COVID19 testing    Beth Fernandes    Interval History   Seen this afternoon. Upset that Covid19 testing is not back and that she remains NPO. Endorses having a headache and stomachache due to not eating anything yet today. No sob. Isn't sure if she wants to stay in hospital to await COVID19 testing or angiogram. Is upset that her friend who is also here got to eat before her angiogram today.  Feels isolated in her room and is worried about being around other people that are hospitalized with COVID19.    -Data reviewed today: I reviewed all new labs and imaging results over the last 24 hours. I personally reviewed no images or EKG's today.    Physical  Exam   Temp: 97.5  F (36.4  C) Temp src: Oral BP: (!) 108/31 Pulse: 71 Heart Rate: 62 Resp: 16 SpO2: 99 % O2 Device: None (Room air)    Vitals:    03/24/20 2030 03/25/20 0010 03/26/20 0646   Weight: 62.9 kg (138 lb 11.2 oz) 63.1 kg (139 lb 1.8 oz) 63.1 kg (139 lb 3.2 oz)     Vital Signs with Ranges  Temp:  [97.1  F (36.2  C)-97.9  F (36.6  C)] 97.5  F (36.4  C)  Pulse:  [71] 71  Heart Rate:  [62-71] 62  Resp:  [16-20] 16  BP: (100-108)/(31-69) 108/31  SpO2:  [97 %-99 %] 99 %  I/O last 3 completed shifts:  In: 990 [P.O.:990]  Out: -     Constitutional: Resting comfortably, alert and answering questions appropriately, is notably upset during the course of my visit but NAD  Respiratory: CTAB, no wheeze/rales/rhonchi, no increased work of breathing  Cardiovascular: HRRR, no MGR, no LE edema  GI: S, NT, ND, +BS  Skin/Integumen: warm/dry  Other:      Medications     - MEDICATION INSTRUCTIONS -       - MEDICATION INSTRUCTIONS -       ACE/ARB/ARNI NOT PRESCRIBED       ASPIRIN NOT PRESCRIBED         aspirin  81 mg Oral Daily     levothyroxine  75 mcg Oral QAM AC     metoprolol tartrate  12.5 mg Oral BID     rosuvastatin  40 mg Oral At Bedtime     sodium chloride (PF)  3 mL Intracatheter Q8H       Data   Recent Labs   Lab 03/25/20  0757 03/24/20  2101 03/24/20  1707   WBC  --   --  6.5   HGB  --   --  12.5   MCV  --   --  89   PLT  --   --  250   NA  --   --  141   POTASSIUM  --   --  3.7   CHLORIDE  --   --  109   CO2  --   --  22   BUN  --   --  20   CR  --   --  0.70   ANIONGAP  --   --  10   HAWK  --   --  9.1   GLC  --   --  93   TROPI <0.015 <0.015 <0.015       No results found for this or any previous visit (from the past 24 hour(s)).

## 2020-03-26 NOTE — PROGRESS NOTES
DATE & TIME: 3/25 1599-8469    Cognitive Concerns/ Orientation : A&Ox4   BEHAVIOR & AGGRESSION TOOL COLOR: Green  CIWA SCORE: N/A   ABNL VS/O2: BP soft. Baseline per pt. HR WNL.   MOBILITY: Ind.   PAIN MANAGMENT: Minor CP earlier in the morning. Declined intervention. Nothing of the rest of the day   DIET: Regular  BOWEL/BLADDER: Cont  ABNL LAB/BG: None so far. On COVID 19 rule out  DRAIN/DEVICES: None  TELEMETRY RHYTHM: NSR. Reported redness on chest from the tele patches. Declined to wear the tele box for the evening. Unable to get another one. Checked CCU, ER, ICU and .   SKIN: Intact.   TESTS/PROCEDURES: Angio once COVID 19 is ruled out  D/C DAY/GOALS/PLACE: TBD  OTHER IMPORTANT INFO: No CP. Seen by cards. Started on PO BID metoprolol and at bedtime Crestor. Cards will do Angio once negative for COVID-19. If positive, will discharge home to self quarantine.

## 2020-03-26 NOTE — PLAN OF CARE
Summary:     DATE & TIME: 3/26 9020-4936   Cognitive Concerns/ Orientation : A&O   BEHAVIOR & AGGRESSION TOOL COLOR: green   ABNL VS/O2: VSS on RA  MOBILITY: INd  PAIN MANAGMENT: denies CP  DIET: NPO for possible angio  BOWEL/BLADDER: voiding  ABNL LAB/BG: COVID R/o  DRAIN/DEVICES: na  TELEMETRY RHYTHM: SR  SKIN: intact  TESTS/PROCEDURES: Angio pending COVID.   D/C DAY/GOALS/PLACE: If + covid, will discharge home and self quarantine. Angio if COVID negative  OTHER IMPORTANT INFO:

## 2020-03-26 NOTE — PLAN OF CARE
CR: Chart reviewed. Per chart review, plan for angio pending results of COVID testing. At the recommendation of the medical team, CR will be held while test pending for COVID-19. Will re-assess status daily.

## 2020-03-27 VITALS
WEIGHT: 137.6 LBS | BODY MASS INDEX: 24.38 KG/M2 | DIASTOLIC BLOOD PRESSURE: 64 MMHG | HEART RATE: 68 BPM | HEIGHT: 63 IN | TEMPERATURE: 97.7 F | RESPIRATION RATE: 18 BRPM | OXYGEN SATURATION: 99 % | SYSTOLIC BLOOD PRESSURE: 104 MMHG

## 2020-03-27 PROCEDURE — 25000132 ZZH RX MED GY IP 250 OP 250 PS 637: Performed by: INTERNAL MEDICINE

## 2020-03-27 PROCEDURE — 99239 HOSP IP/OBS DSCHRG MGMT >30: CPT | Performed by: INTERNAL MEDICINE

## 2020-03-27 PROCEDURE — 40000893 ZZH STATISTIC PT IP EVAL DEFER: Performed by: PHYSICAL THERAPIST

## 2020-03-27 PROCEDURE — 25000132 ZZH RX MED GY IP 250 OP 250 PS 637: Performed by: HOSPITALIST

## 2020-03-27 RX ORDER — ROSUVASTATIN CALCIUM 20 MG/1
40 TABLET, COATED ORAL DAILY
Qty: 90 TABLET | Refills: 0
Start: 2020-03-27 | End: 2020-04-09 | Stop reason: DRUGHIGH

## 2020-03-27 RX ORDER — METOPROLOL TARTRATE 25 MG/1
12.5 TABLET, FILM COATED ORAL 2 TIMES DAILY
Qty: 30 TABLET | Refills: 0 | Status: SHIPPED | OUTPATIENT
Start: 2020-03-27 | End: 2020-04-09

## 2020-03-27 RX ADMIN — LEVOTHYROXINE SODIUM 75 MCG: 75 TABLET ORAL at 08:03

## 2020-03-27 RX ADMIN — ASPIRIN 81 MG: 81 TABLET, DELAYED RELEASE ORAL at 08:03

## 2020-03-27 RX ADMIN — METOPROLOL TARTRATE 12.5 MG: 25 TABLET, FILM COATED ORAL at 08:03

## 2020-03-27 ASSESSMENT — ACTIVITIES OF DAILY LIVING (ADL)
ADLS_ACUITY_SCORE: 11

## 2020-03-27 ASSESSMENT — MIFFLIN-ST. JEOR: SCORE: 1118.28

## 2020-03-27 NOTE — PROGRESS NOTES
DATE & TIME: 3/26 5790-0482    Cognitive Concerns/ Orientation : A&Ox4   BEHAVIOR & AGGRESSION TOOL COLOR: Green  CIWA SCORE: N/A   ABNL VS/O2: None  MOBILITY: Ind   PAIN MANAGMENT: none  DIET: Regular   BOWEL/BLADDER: Inc   ABNL LAB/BG: None  DRAIN/DEVICES: N/A  TELEMETRY RHYTHM: Refused tele box as tele patches makes pt skin red and itchy.   SKIN: Intact   TESTS/PROCEDURES: None  D/C DAY/GOALS/PLACE: home on 3/27?  OTHER IMPORTANT INFO: No CP. Angio got canceled since the COVID-19's result is not back yet. Cardiology will do the angio tomorrow after COVID-19 is ruled out. NPO after midnight. If in case COVID-19 positive, will discharge home to self-quarantine.

## 2020-03-27 NOTE — DISCHARGE SUMMARY
[facility]    Discharge Summary  Hospitalist    Date of Admission:  3/24/2020  Date of Discharge:  3/27/2020  Discharging Provider: Beth Fernandes    Discharge Diagnoses   Atypical Chest Pain, with history of chronic stable angina  CAD  URI sx including dyspnea, subjective fevers/chills, headache  Dyslipidemia  Hypothyroidism  Hx of aortic root dilation and thoracic aortic aneurysm    History of Present Illness   Renea Lerma is a 69 year old female with PMHx of mild ascending aorta dilation, dyslipidemia, hypothyroidism, GERD, asthma, hx of Schatzki's ring and esophageal dilation who was admitted on 3/24/2020 for evaluation of constellation of symptoms including dyspnea, chest discomfort, subjective fever and chills.     Hospital Course   Renea Lerma was admitted on 3/24/2020.  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 That showed a calcified plaque in involving the mid LAD with associated moderate stenosis, no significant disease elsewhere. Had been scheduled for further evaluation with outpatient angiogram on 3/23/20 but this was cancelled dt current COVID19 outbreak and patient's reported URI sx.     Endorsed complaints of substernal chest pain that he begin 3 days prior to presentation which intensified the day of admission. No radiation. No worsening with exertion. No improvement with rest. Had URI sx in the days preceding chest discomfort. In ED, VSS. EKG showed no acute ischemic changes. Initial trop neg. Given dose of Toradol x1. Presentation discussed with Dr. Rosen -- recommended admission to hospital with plans for coronary angiogram this stay. Serial trops remained neg. Chest pain  resolved during stay. On further assessment per cardiology -- suspect presentation was secondary to atypical chest pain.     Continued on low dose aspirin. Rosuvastatin dose increased to 40mg HS. Metoprolol 12.5mg BID added this stay. Plan per cardiology had been to pursue angiogram once COVID19 testing completed -- as it took several days to get results and given that routine angiograms are not done on weekends -- patient ultimately discharged home on 3/27 without having an angiogram this stay (COVID19 testing was still pending at the time of discharge). Cardiology notified and okay with outpatient followup.     Will follow up with her cardiologist (Dr. Casey) to determine timing for future angiogram.       URI sx including dyspnea, subjective fevers/chills, headache  Endorsed constellation of URI sx including dyspnea, subjective fevers/chills, headache that began on 3/22. In ED, was afebrile and hemodynamically stable. CXR clear. COVID19 PCR sent at 0035 on 3/25 -- results still pending at the time of discharge on 3/27 (patient didn't want to have to continue to wait for results of testing in the hospital setting, knowing that an angiogram would not be done over a weekend). Remained afebrile with O2 sats above 90s on RA throughout stay. No cough or ongoing URI sx. Recommended to continue to self quarantine until she is notified of results of COVID testing.      Dyslipidemia  Chronic and stable on rosuvastatin -- dose increased from 20mg to 40mg HS this stay per cardiology.      Hypothyroidism  Chronic and stable on levothyroxine     Hx of aortic root dilation and thoracic aortic aneurysm  Last echo in 12/2019 showed mildly dilated ascending aorta at 4.4cm. CTcoronary angiogram in 2/2020 confirmed these findings.   Monitoring. No concerns at this time     Beth Fernandes, DO    Pending Results   These results will be followed up by PCP  Unresulted Labs Ordered in the Past 30 Days of this Admission     Date  and Time Order Name Status Description    3/24/2020 2241 COVID-19 Virus (Coronavirus) PCR to [place] [facility] Nasopharyngeal (NP) Swab in UTM In process           Code Status   Full Code       Primary Care Physician   Caprice Lares    Physical Exam   Temp: 97.7  F (36.5  C) Temp src: Oral BP: 104/64 Pulse: 68 Heart Rate: 68 Resp: 18 SpO2: 99 % O2 Device: None (Room air)    Vitals:    03/25/20 0010 03/26/20 0646 03/27/20 0500   Weight: 63.1 kg (139 lb 1.8 oz) 63.1 kg (139 lb 3.2 oz) 62.4 kg (137 lb 9.6 oz)     Vital Signs with Ranges  Temp:  [97.3  F (36.3  C)-98.9  F (37.2  C)] 97.7  F (36.5  C)  Pulse:  [60-69] 68  Heart Rate:  [60-76] 68  Resp:  [16-18] 18  BP: ()/(42-67) 104/64  SpO2:  [98 %-99 %] 99 %  I/O last 3 completed shifts:  In: 1000 [P.O.:1000]  Out: -     General: Resting comfortably, alert, conversive, NAD  CVS: HRRR, no MGR, no LE edema  Respiratory: CTAB, no wheeze/rales/rhonchi, no increased work of breathing  GI: S, NT, ND, +BS  Skin: Warm/dry    Discharge Disposition   Discharged to home  Condition at discharge: Stable    Consultations This Hospital Stay   CARDIOLOGY IP CONSULT    Time Spent on this Encounter   IBeth DO, personally saw the patient today and spent greater than 30 minutes discharging this patient.    Discharge Orders      Reason for your hospital stay    Evaluation of your chest discomfort and upper respiratory symptoms which were concerning for possible COVID19 infection. You were tested this stay, results were still pending at time of your discharge. Plan had been to pursue an angiogram as previously recommended by your cardiologist, though needed to have results of your COVID19 testing prior to proceeding. As you did not have an angiogram this stay, you will need to follow up with your cardiologist to reschedule this procedure.     Follow-up and recommended labs and tests     1. Follow up with your cardiologist to arrange for outpatient angiogram as  previously advised  2. Follow up with your PCP as needed in the next 1-2 weeks.     Activity    Your activity upon discharge: activity as tolerated     Diet    Follow this diet upon discharge: Cardiac     Discharge Medications   Current Discharge Medication List      START taking these medications    Details   metoprolol tartrate (LOPRESSOR) 25 MG tablet Take 0.5 tablets (12.5 mg) by mouth 2 times daily  Qty: 30 tablet, Refills: 0    Comments: Future refills by [facility] Cardiology or PCP Dr. Caprice Lares with phone number [phone].  Associated Diagnoses: Coronary artery disease involving native heart without angina pectoris, unspecified vessel or lesion type         CONTINUE these medications which have CHANGED    Details   rosuvastatin (CRESTOR) 20 MG tablet Take 2 tablets (40 mg) by mouth daily at bedtime  Qty: 90 tablet, Refills: 0    Associated Diagnoses: Coronary artery disease involving native heart without angina pectoris, unspecified vessel or lesion type; Elevated coronary artery calcium score; Mixed hyperlipidemia         CONTINUE these medications which have NOT CHANGED    Details   aspirin (ASA) 81 MG EC tablet Take 1 tablet (81 mg) by mouth daily    Associated Diagnoses: Coronary artery disease involving native heart without angina pectoris, unspecified vessel or lesion type      cholecalciferol (VITAMIN D3) 5000 units (125 mcg) capsule Take 5,000 Units by mouth once a week      estradiol (VIVELLE-DOT) 0.0375 MG/24HR BIW patch Place 1 patch onto the skin twice a week  Qty: 24 patch, Refills: 3    Associated Diagnoses: Fibrocystic breast disease, unspecified laterality      levothyroxine (SYNTHROID/LEVOTHROID) 75 MCG tablet Take 1 tablet (75 mcg) by mouth daily  Qty: 90 tablet, Refills: 3    Associated Diagnoses: Acquired hypothyroidism      multivitamin, therapeutic with minerals (MULTI-VITAMIN) TABS tablet Take 1 tablet by mouth daily  Qty: 30 each, Refills: 11    Associated Diagnoses: Routine general  medical examination at a health care facility      albuterol (PROAIR HFA/PROVENTIL HFA/VENTOLIN HFA) 108 (90 Base) MCG/ACT inhaler Inhale 2 puffs into the lungs every 6 hours as needed for shortness of breath / dyspnea or wheezing  Qty: 1 Inhaler, Refills: 1    Associated Diagnoses: Mild intermittent asthma without complication      fluticasone (FLONASE) 50 MCG/ACT spray Spray 1 spray into both nostrils daily as needed            Allergies   Allergies   Allergen Reactions     Dust Mites      Mold      No Known Drug Allergies      Seasonal Allergies      Adhesive Tape Rash     Data   Most Recent 3 CBC's:  Recent Labs   Lab Test 03/24/20  1707 11/04/19  0845 11/08/18  0957   WBC 6.5 4.8 4.5   HGB 12.5 12.7 12.3   MCV 89 91 91    241 230      Most Recent 3 BMP's:  Recent Labs   Lab Test 03/24/20  1707 11/04/19  0845 11/08/18  0957    139 139   POTASSIUM 3.7 4.4 4.3   CHLORIDE 109 107 105   CO2 22 25 26   BUN 20 20 15   CR 0.70 0.67 0.66   ANIONGAP 10 7 8   HAWK 9.1 9.1 9.3   GLC 93 87 83     Most Recent 3 Troponin's:  Recent Labs   Lab Test 03/25/20  0757 03/24/20  2101 03/24/20  1707   TROPI <0.015 <0.015 <0.015     Most Recent Cholesterol Panel:  Recent Labs   Lab Test 02/20/20  0952   CHOL 242*   *   HDL 82   TRIG 102     Results for orders placed or performed during the hospital encounter of 03/24/20   XR Chest Port 1 View    Narrative    CHEST ONE VIEW PORTABLE   3/24/2020 6:04 PM     HISTORY:  Cough. Pleuritic chest pain.    COMPARISON: 6/14/2017.      Impression    IMPRESSION: Negative chest. Lungs clear.    OMKAR PORTILLO MD

## 2020-03-27 NOTE — PLAN OF CARE
A&O. VSS except BP slightly soft. Afebrile. Denies CP and SOB. No cough. LS clear. Refusing tele. NPO since midnight for possible angiogram today.

## 2020-03-27 NOTE — PROGRESS NOTES
Discharge order received. Discharge instructions reviewed and discussed with pt. Pt verbalized understanding. All questions answered. Rx filled and given to pt. PIV removed. Pt left the unit via W/C accompanied by staff.

## 2020-03-30 ENCOUNTER — TELEPHONE (OUTPATIENT)
Dept: FAMILY MEDICINE | Facility: CLINIC | Age: 70
End: 2020-03-30

## 2020-03-30 LAB — COVID-19 VIRUS PCR RESULT FROM MDH: NEGATIVE

## 2020-03-30 NOTE — RESULT ENCOUNTER NOTE
Coronavirus (COVID-19) Notification    Patient notified of Negative COVID-19.    Patient can discontinue Quarantee and is free to resume normal activites.  If Patient has questions that you are not able to answer they can contact PCP or [facility] hotline ([phone])    Please Contact your PCP clinic or return to the Emergency department if your:  Symptoms worsen or other concerning symptom's.

## 2020-03-31 NOTE — TELEPHONE ENCOUNTER
"ED/Discharge Protocol    \"Hi, my name is Aleena Tellez RN, a registered nurse, and I am calling on behalf of Dr. Lares's office at [place].  I am calling to follow up and see how things are going for you after your recent visit.\"    \"I see that you were in the (ER/UC/IP) on 3/24/2020-3/27/2020.    How are you doing now that you are home?\" patient states she's doing ok at home    Is patient experiencing symptoms that may require a hospital visit?  No    Discharge Instructions    \"Let's review your discharge instructions.  What is/are the follow-up recommendations?  Pt. Response: F/U with PCP    \"Were you instructed to make a follow-up appointment?\"  Pt. Response: Yes.  Has appointment been made?   No.  \"Can I help you schedule that appointment?\" patient wants to f/u with cardiology instead      \"When you see the provider, I would recommend that you bring your discharge instructions with you.    Medications    \"How many new medications are you on since your hospitalization/ED visit?\"    0-1  \"How many of your current medicines changed (dose, timing, name, etc.) while you were in the hospital/ED visit?\"   0-1  \"Do you have questions about your medications?\"   No  \"Were you newly diagnosed with heart failure, COPD, diabetes or did you have a heart attack?\"   No  For patients on insulin: \"Did you start on insulin in the hospital or did you have your insulin dose changed?\"   No  Post Discharge Medication Reconciliation Status: discharge medications reconciled, continue medications without change.    Was MTM referral placed (*Make sure to put transitions as reason for referral)?   No    Call Summary    \"Do you have any questions or concerns about your condition or care plan at the moment?\"    No  Triage nurse advice given: f/u with cardiology (wonders about getting her stent placed - wants to put it off)     Patient was in ER 2 times in the past year (assess appropriateness of ER visits.)      \"If you have questions or " "things don't continue to improve, we encourage you contact us through the main clinic number,  [phone].  Even if the clinic is not open, triage nurses are available 24/7 to help you.     We would like you to know that our clinic has extended hours (provide information).  We also have urgent care (provide details on closest location and hours/contact info)\"      \"Thank you for your time and take care!\"        Aleena ALVARADO RN    Discharge Orders            Reason for your hospital stay     Evaluation of your chest discomfort and upper respiratory symptoms which were concerning for possible COVID19 infection. You were tested this stay, results were still pending at time of your discharge. Plan had been to pursue an angiogram as previously recommended by your cardiologist, though needed to have results of your COVID19 testing prior to proceeding. As you did not have an angiogram this stay, you will need to follow up with your cardiologist to reschedule this procedure.          Follow-up and recommended labs and tests      1. Follow up with your cardiologist to arrange for outpatient angiogram as previously advised  2. Follow up with your PCP as needed in the next 1-2 weeks.          Activity     Your activity upon discharge: activity as tolerated          Diet     Follow this diet upon discharge: Cardiac        Discharge Medications          Current Discharge Medication List            START taking these medications     Details   metoprolol tartrate (LOPRESSOR) 25 MG tablet Take 0.5 tablets (12.5 mg) by mouth 2 times daily  Qty: 30 tablet, Refills: 0     Comments: Future refills by [facility] Cardiology or PCP Dr. Caprice Lares with phone number [phone].  Associated Diagnoses: Coronary artery disease involving native heart without angina pectoris, unspecified vessel or lesion type       "

## 2020-04-01 ENCOUNTER — TELEPHONE (OUTPATIENT)
Dept: CARDIOLOGY | Facility: CLINIC | Age: 70
End: 2020-04-01

## 2020-04-01 NOTE — TELEPHONE ENCOUNTER
Patient called, left a message.  Patient was discharged 3-  - recommendations were Follow up with your cardiologist to arrange for outpatient angiogram as previously advised  - admitted on 3/24/2020 for evaluation of constellation of symptoms including dyspnea, chest discomfort, subjective fever and chills.      Covid results negative.    Spoke with patient who states she is feeling better. Does still have a little bit of chest tightness but wonders if it could be due to the virus, URI, that she had.     Patient wondering what to do now - recommendations or should she be scheduled  With a Vitrual visit with Dr. Casey.   Or post discharge  phone visit with [facility]?     Will message Dr. Casey.

## 2020-04-01 NOTE — TELEPHONE ENCOUNTER
Per Dr. Casey's reply Patient called and offered virtual visit next available with Dr. Casey. Post hospital.   Scheduling to call and offer 4-2-2020.

## 2020-04-09 ENCOUNTER — DOCUMENTATION ONLY (OUTPATIENT)
Dept: CARDIOLOGY | Facility: CLINIC | Age: 70
End: 2020-04-09

## 2020-04-09 ENCOUNTER — VIRTUAL VISIT (OUTPATIENT)
Dept: CARDIOLOGY | Facility: CLINIC | Age: 70
End: 2020-04-09
Payer: COMMERCIAL

## 2020-04-09 DIAGNOSIS — I25.10 CORONARY ARTERY DISEASE INVOLVING NATIVE HEART WITHOUT ANGINA PECTORIS, UNSPECIFIED VESSEL OR LESION TYPE: Primary | ICD-10-CM

## 2020-04-09 DIAGNOSIS — I25.10 CORONARY ARTERY DISEASE INVOLVING NATIVE HEART WITHOUT ANGINA PECTORIS, UNSPECIFIED VESSEL OR LESION TYPE: ICD-10-CM

## 2020-04-09 DIAGNOSIS — I25.118 CORONARY ARTERY DISEASE OF NATIVE ARTERY OF NATIVE HEART WITH STABLE ANGINA PECTORIS (H): Primary | ICD-10-CM

## 2020-04-09 DIAGNOSIS — J45.20 MILD INTERMITTENT ASTHMA WITHOUT COMPLICATION: ICD-10-CM

## 2020-04-09 DIAGNOSIS — R07.9 CHEST PAIN: ICD-10-CM

## 2020-04-09 PROCEDURE — 99214 OFFICE O/P EST MOD 30 MIN: CPT | Mod: GT | Performed by: INTERNAL MEDICINE

## 2020-04-09 RX ORDER — ALBUTEROL SULFATE 90 UG/1
2 AEROSOL, METERED RESPIRATORY (INHALATION) EVERY 6 HOURS PRN
Qty: 1 INHALER | Refills: 1 | Status: SHIPPED | OUTPATIENT
Start: 2020-04-09 | End: 2020-08-06

## 2020-04-09 RX ORDER — ROSUVASTATIN CALCIUM 20 MG/1
20 TABLET, COATED ORAL DAILY
COMMUNITY
End: 2020-06-04

## 2020-04-09 RX ORDER — METOPROLOL TARTRATE 25 MG/1
12.5 TABLET, FILM COATED ORAL 2 TIMES DAILY
Qty: 30 TABLET | Refills: 0 | Status: SHIPPED | OUTPATIENT
Start: 2020-04-09 | End: 2020-05-18

## 2020-04-09 NOTE — PROGRESS NOTES
Message from Dr. Casey:  Can we set this patient's angio up with Dr Rosen in 2-3 weeks?    Order placed for left heart cath  Message sent to scheduling to contact patient.

## 2020-04-09 NOTE — PROGRESS NOTES
"Renea Lerma is a 69 year old female who is being evaluated via a billable video visit.      The patient has been notified of following:     \"This video visit will be conducted via a call between you and your physician/provider. We have found that certain health care needs can be provided without the need for an in-person physical exam.  This service lets us provide the care you need with a video conversation.  If a prescription is necessary we can send it directly to your pharmacy.  If lab work is needed we can place an order for that and you can then stop by our lab to have the test done at a later time.    Video visits are billed at different rates depending on your insurance coverage.  Please reach out to your insurance provider with any questions.    If during the course of the call the physician/provider feels a video visit is not appropriate, you will not be charged for this service.\"    Patient has given verbal consent for Video visit? Yes    How would you like to obtain your AVS? Mail a copy    Patient would like the video invitation sent by: Send to e-mail at: [email or web address]      Video Start Time: 10:48 AM    Renea Lerma complains of    Chief Complaint   Patient presents with     FU Hospitalization     Results     review lab results       I had the pleasure of seeing Mr. Lerma in followup at the [facility] today.  She is a very pleasant 69-year-old female who I saw for the first time in February 2020.  She was seeing Dr. Bustamante prior to that for followup of a dilated ascending thoracic aorta as well as occasional dyspnea and chest discomfort on exertion. From a risk factor standpoint, the patient does have a history of dyslipidemia but was reluctant to take statins at the time.    Due to persistent chest discomfort on exertion, I ordered a CT coronary angiogram after her office visit in February.  This demonstrated calcified plaque involving " the mid left anterior descending artery with associated moderate stenosis.  The other coronary arteries appeared angiographically normal.  Given these findings, I recommended initiation of statin therapy and also recommended coronary angiography for persistent chest discomfort.    Due to the ongoing coronavirus crisis, she deferred her coronary angiogram but was then admitted to [facility] from 3/24/2020 through 3/27/2020 for atypical chest discomfort in association with upper respiratory tract infection-like symptoms.  Coronary angiography was initially planned but then deferred due to concerns regarding underlying covid infection.  Eventually she underwent testing and was negative.  She was seen by Dr. Carr in consultation and coronary angiography was felt to be safe to be deferred as an outpatient.    She feels well today.  She states that her symptoms have mostly resolved.  She has occasional palpitations that are more pronounced at night.  She has been walking without any limitations.  She has been compliant with her rosuvastatin and metoprolol therapy.       General Appearance:    no distress, normal body habitus, upright.    ENT/Mouth:    membranes moist, no nasal discharge or bleeding gums. Normal head shape, no evidence of injury or laceration.    EYES:    no scleral icterus, normal conjunctivae    Neck:    no evidence of thyromegaly. Supple    Chest/Lungs:    No audible wheezing equal chest wall expansion. Non labored breathing. No cough.    Cardiovascular:    No evidence of elevated jugular venous pressure. No evidence of pitting edema bilaterally    Abdomen:    no evidence of abdominal distention. No observe juandice.    Extremities:    no cyanosis or clubbing noted.    Skin:    no xanthelasma, normal skin collar. No evidence of facial lacerations.    Neurologic:    Normal arm motion bilateral, no tremors. No evidence of focal defect.    ?    Psychiatric:    alert and oriented x3, calm               IMPRESSION:   1.  Mild dilatation of the ascending thoracic aorta which has been stable.  There is no family history of aortic dissection.   2.  Dyslipidemia, currently on rosuvastatin.  3.  Chest discomfort with an abnormal CT coronary angiogram demonstrating moderate LAD stenosis.    The patient has recovered well from her recent hospitalization.  This does appear to been triggered by an underlying viral syndrome, but I do think the question of underlying coronary artery disease still needs to be addressed.  To this effect I have recommended that we proceed with angiography in approximately 3 weeks or so.  At the same time we can assess the adequacy of her current rosuvastatin therapy with a lipid profile.    To work-up her palpitations further, I recommended a ZIO patch monitor.  We will contact her once the results are available.         I have reviewed and updated the patient's Past Medical History, Social History, Family History and Medication List.    ALLERGIES  Dust mites; Mold; No known drug allergies; Seasonal allergies; and Adhesive tape    Patient unable to obtain blood pressure, pulse and weight and home.   Urszula Turcios LPN    Video-Visit Details    Type of service:  Video Visit    Video End Time (time video stopped): 11:11    Originating Location (pt. Location): Home    Distant Location (provider location):  [facility]     Mode of Communication:  Video Conference via [employer] Harry      Kathia Casey MD

## 2020-04-10 ENCOUNTER — TELEPHONE (OUTPATIENT)
Dept: CARDIOLOGY | Facility: CLINIC | Age: 70
End: 2020-04-10

## 2020-04-10 NOTE — TELEPHONE ENCOUNTER
Call from patient, she is concerned that the angiogram set up for 4/21/2020 is too soon. She was told to do a 7-day ziopatch first (which has to be mailed to her) and then in 3 weeks the [facility] with Dr. Rosen.  She would like to have scheduling call her on Monday to do some adjustments and make sure the ziopatch is on its way to her home.    Message to [facility] team to see if patient can have a monitor mailed to her.  Message to scheduling to see if [facility] can be moved out a week and stay with Dr. Rosen.    Patient will call back on Tuesday for an update if she is not contacted by scheduling on Monday.

## 2020-04-13 NOTE — TELEPHONE ENCOUNTER
Patient called wondering what the reply was regarding the monitor, which she has ot received, and the angiogram. Patient will call back in a day or 2 for an update.

## 2020-04-14 NOTE — TELEPHONE ENCOUNTER
Pt contacted by scheduling department, will have Ziopatch placed 4/16 and coronary angiogram with Dr Rosen on 4/29.

## 2020-04-16 ENCOUNTER — HOSPITAL ENCOUNTER (OUTPATIENT)
Dept: CARDIOLOGY | Facility: CLINIC | Age: 70
Discharge: HOME | End: 2020-04-16
Attending: INTERNAL MEDICINE | Admitting: INTERNAL MEDICINE
Payer: COMMERCIAL

## 2020-04-16 DIAGNOSIS — I25.118 CORONARY ARTERY DISEASE OF NATIVE ARTERY OF NATIVE HEART WITH STABLE ANGINA PECTORIS (H): ICD-10-CM

## 2020-04-16 PROCEDURE — 0296T ZIO PATCH HOLTER ADULT PEDIATRIC GREATER THAN 48 HRS: CPT

## 2020-04-16 PROCEDURE — 0298T ZIO PATCH HOLTER ADULT PEDIATRIC GREATER THAN 48 HRS: CPT | Performed by: INTERNAL MEDICINE

## 2020-04-23 ENCOUNTER — DOCUMENTATION ONLY (OUTPATIENT)
Dept: CARDIOLOGY | Facility: CLINIC | Age: 70
End: 2020-04-23

## 2020-04-23 DIAGNOSIS — R07.9 CHEST PAIN: Primary | ICD-10-CM

## 2020-04-23 RX ORDER — SODIUM CHLORIDE 9 MG/ML
INJECTION, SOLUTION INTRAVENOUS CONTINUOUS
Status: CANCELLED | OUTPATIENT
Start: 2020-04-23

## 2020-04-23 RX ORDER — POTASSIUM CHLORIDE 1500 MG/1
20 TABLET, EXTENDED RELEASE ORAL
Status: CANCELLED | OUTPATIENT
Start: 2020-04-23

## 2020-04-23 RX ORDER — LIDOCAINE 40 MG/G
CREAM TOPICAL
Status: CANCELLED | OUTPATIENT
Start: 2020-04-23

## 2020-04-23 RX ORDER — ASPIRIN 81 MG/1
81 TABLET ORAL DAILY
Status: CANCELLED | OUTPATIENT
Start: 2020-04-23 | End: 2020-04-25

## 2020-04-23 NOTE — PROGRESS NOTES
Contacted patient to review pre-cath procedures: patient is scheduled for coronary angiogram (left)  on 4/27/2020 . Patient's arrival time is 0630 and procedure time is 0830. Patient is aware to be NPO except for medications. Patient has arranged for transportation and 24 hour f/u care. Patient has no known contract dye allergy. Patient is not diabetic. Patient is not taking anti-coagulation medication. Patient's renal function is WNL for procedure. Patient will continue daily aspirin dose 81. Order for procedure entered.    Travel screening done.    WELLNESS SCREENING TOOL    Symptom screening    Do you have a:     Fever or reported chills? no      A new cough (started within the past 14 days)? no      Shortness of breath (started within the past 14 days) no      Nausea, vomiting or diarrhea?  no      Muscle aches or fatigue (started within the past 14 days) no    Within the past 3 weeks, have you been exposed to someone with a known positive illness below? no       COVID - 19 (known or suspected) no     Pertussis? no      Chicken pox? no      Measles? no     Patient notified of visitor restriction: yes    Patient's appointment status: Patient will be seen in [facility] as scheduled on 4/27/2020     NOTE: snapboard schedule still has patient listed as 4/29/2020. She states she was called on Tuesday and moved to the 27th so she could have Dr. Rosen (scheduled were being changed).  Called angiogram scheduling staff to clarify and correct snapboard issues.    1500 spoke with scheduling and 4/27/2020 is correct, snapboard has been updated.  Called patient with update and verification of date and time.

## 2020-04-27 ENCOUNTER — SURGERY (OUTPATIENT)
Age: 70
End: 2020-04-27
Payer: COMMERCIAL

## 2020-04-27 ENCOUNTER — HOSPITAL ENCOUNTER (OUTPATIENT)
Facility: CLINIC | Age: 70
Discharge: HOME | End: 2020-04-27
Admitting: INTERNAL MEDICINE
Payer: COMMERCIAL

## 2020-04-27 VITALS
HEART RATE: 75 BPM | RESPIRATION RATE: 16 BRPM | SYSTOLIC BLOOD PRESSURE: 99 MMHG | DIASTOLIC BLOOD PRESSURE: 45 MMHG | HEIGHT: 63 IN | OXYGEN SATURATION: 100 % | WEIGHT: 141 LBS | BODY MASS INDEX: 24.98 KG/M2 | TEMPERATURE: 97.9 F

## 2020-04-27 DIAGNOSIS — I25.10 CORONARY ARTERY DISEASE INVOLVING NATIVE HEART WITHOUT ANGINA PECTORIS, UNSPECIFIED VESSEL OR LESION TYPE: ICD-10-CM

## 2020-04-27 DIAGNOSIS — R07.9 CHEST PAIN: ICD-10-CM

## 2020-04-27 DIAGNOSIS — I25.10 ATHEROSCLEROSIS OF NATIVE CORONARY ARTERY OF NATIVE HEART WITHOUT ANGINA PECTORIS: Primary | ICD-10-CM

## 2020-04-27 PROBLEM — Z98.890 STATUS POST CORONARY ANGIOGRAM: Status: ACTIVE | Noted: 2020-04-27

## 2020-04-27 LAB
ALT SERPL W P-5'-P-CCNC: 30 U/L (ref 0–50)
ANION GAP SERPL CALCULATED.3IONS-SCNC: 5 MMOL/L (ref 3–14)
APTT PPP: 28 SEC (ref 22–37)
BUN SERPL-MCNC: 12 MG/DL (ref 7–30)
CALCIUM SERPL-MCNC: 9.2 MG/DL (ref 8.5–10.1)
CATH EF ESTIMATED: 60 %
CHLORIDE SERPL-SCNC: 111 MMOL/L (ref 94–109)
CHOLEST SERPL-MCNC: 146 MG/DL
CO2 SERPL-SCNC: 25 MMOL/L (ref 20–32)
CREAT SERPL-MCNC: 0.74 MG/DL (ref 0.52–1.04)
ERYTHROCYTE [DISTWIDTH] IN BLOOD BY AUTOMATED COUNT: 12.9 % (ref 10–15)
GFR SERPL CREATININE-BSD FRML MDRD: 82 ML/MIN/{1.73_M2}
GLUCOSE SERPL-MCNC: 88 MG/DL (ref 70–99)
HCT VFR BLD AUTO: 33.8 % (ref 35–47)
HDLC SERPL-MCNC: 80 MG/DL
HGB BLD-MCNC: 11.6 G/DL (ref 11.7–15.7)
INR PPP: 1.05 (ref 0.86–1.14)
LDLC SERPL CALC-MCNC: 51 MG/DL
MCH RBC QN AUTO: 30.6 PG (ref 26.5–33)
MCHC RBC AUTO-ENTMCNC: 34.3 G/DL (ref 31.5–36.5)
MCV RBC AUTO: 89 FL (ref 78–100)
NONHDLC SERPL-MCNC: 66 MG/DL
PLATELET # BLD AUTO: 220 10E9/L (ref 150–450)
POTASSIUM SERPL-SCNC: 3.7 MMOL/L (ref 3.4–5.3)
RBC # BLD AUTO: 3.79 10E12/L (ref 3.8–5.2)
SODIUM SERPL-SCNC: 141 MMOL/L (ref 133–144)
TRIGL SERPL-MCNC: 73 MG/DL
WBC # BLD AUTO: 5.5 10E9/L (ref 4–11)

## 2020-04-27 PROCEDURE — 99152 MOD SED SAME PHYS/QHP 5/>YRS: CPT | Performed by: INTERNAL MEDICINE

## 2020-04-27 PROCEDURE — 85610 PROTHROMBIN TIME: CPT

## 2020-04-27 PROCEDURE — C1894 INTRO/SHEATH, NON-LASER: HCPCS | Performed by: INTERNAL MEDICINE

## 2020-04-27 PROCEDURE — 85027 COMPLETE CBC AUTOMATED: CPT

## 2020-04-27 PROCEDURE — 25000132 ZZH RX MED GY IP 250 OP 250 PS 637: Performed by: INTERNAL MEDICINE

## 2020-04-27 PROCEDURE — 36415 COLL VENOUS BLD VENIPUNCTURE: CPT

## 2020-04-27 PROCEDURE — 25800030 ZZH RX IP 258 OP 636: Performed by: INTERNAL MEDICINE

## 2020-04-27 PROCEDURE — 27210794 ZZH OR GENERAL SUPPLY STERILE: Performed by: INTERNAL MEDICINE

## 2020-04-27 PROCEDURE — 25000125 ZZHC RX 250: Performed by: INTERNAL MEDICINE

## 2020-04-27 PROCEDURE — 25000128 H RX IP 250 OP 636: Performed by: INTERNAL MEDICINE

## 2020-04-27 PROCEDURE — 93458 L HRT ARTERY/VENTRICLE ANGIO: CPT | Mod: 26 | Performed by: INTERNAL MEDICINE

## 2020-04-27 PROCEDURE — 93458 L HRT ARTERY/VENTRICLE ANGIO: CPT | Performed by: INTERNAL MEDICINE

## 2020-04-27 PROCEDURE — 40000065 ZZH STATISTIC EKG NON-CHARGEABLE

## 2020-04-27 PROCEDURE — 93005 ELECTROCARDIOGRAM TRACING: CPT

## 2020-04-27 PROCEDURE — 80048 BASIC METABOLIC PNL TOTAL CA: CPT

## 2020-04-27 PROCEDURE — 40000852 ZZH STATISTIC HEART CATH LAB OR EP LAB

## 2020-04-27 PROCEDURE — 84460 ALANINE AMINO (ALT) (SGPT): CPT

## 2020-04-27 PROCEDURE — 85730 THROMBOPLASTIN TIME PARTIAL: CPT

## 2020-04-27 PROCEDURE — 80061 LIPID PANEL: CPT

## 2020-04-27 PROCEDURE — 93010 ELECTROCARDIOGRAM REPORT: CPT | Performed by: INTERNAL MEDICINE

## 2020-04-27 PROCEDURE — 99152 MOD SED SAME PHYS/QHP 5/>YRS: CPT | Mod: 59 | Performed by: INTERNAL MEDICINE

## 2020-04-27 RX ORDER — SODIUM CHLORIDE 9 MG/ML
INJECTION, SOLUTION INTRAVENOUS CONTINUOUS
Status: DISCONTINUED | OUTPATIENT
Start: 2020-04-27 | End: 2020-04-27 | Stop reason: HOSPADM

## 2020-04-27 RX ORDER — FLUMAZENIL 0.1 MG/ML
0.2 INJECTION, SOLUTION INTRAVENOUS
Status: DISCONTINUED | OUTPATIENT
Start: 2020-04-27 | End: 2020-04-27 | Stop reason: HOSPADM

## 2020-04-27 RX ORDER — ASPIRIN 81 MG/1
81 TABLET ORAL DAILY
Status: DISCONTINUED | OUTPATIENT
Start: 2020-04-27 | End: 2020-04-27 | Stop reason: HOSPADM

## 2020-04-27 RX ORDER — LIDOCAINE 40 MG/G
CREAM TOPICAL
Status: DISCONTINUED | OUTPATIENT
Start: 2020-04-27 | End: 2020-04-27 | Stop reason: HOSPADM

## 2020-04-27 RX ORDER — ATROPINE SULFATE 0.1 MG/ML
0.5 INJECTION INTRAVENOUS EVERY 5 MIN PRN
Status: DISCONTINUED | OUTPATIENT
Start: 2020-04-27 | End: 2020-04-27 | Stop reason: HOSPADM

## 2020-04-27 RX ORDER — NITROGLYCERIN 5 MG/ML
VIAL (ML) INTRAVENOUS
Status: DISCONTINUED | OUTPATIENT
Start: 2020-04-27 | End: 2020-04-27 | Stop reason: HOSPADM

## 2020-04-27 RX ORDER — ASPIRIN 81 MG/1
81 TABLET ORAL DAILY
Status: CANCELLED | OUTPATIENT
Start: 2020-04-27

## 2020-04-27 RX ORDER — HEPARIN SODIUM 1000 [USP'U]/ML
INJECTION, SOLUTION INTRAVENOUS; SUBCUTANEOUS
Status: DISCONTINUED | OUTPATIENT
Start: 2020-04-27 | End: 2020-04-27 | Stop reason: HOSPADM

## 2020-04-27 RX ORDER — IOPAMIDOL 755 MG/ML
INJECTION, SOLUTION INTRAVASCULAR
Status: DISCONTINUED | OUTPATIENT
Start: 2020-04-27 | End: 2020-04-27 | Stop reason: HOSPADM

## 2020-04-27 RX ORDER — FENTANYL CITRATE 50 UG/ML
INJECTION, SOLUTION INTRAMUSCULAR; INTRAVENOUS
Status: DISCONTINUED | OUTPATIENT
Start: 2020-04-27 | End: 2020-04-27 | Stop reason: HOSPADM

## 2020-04-27 RX ORDER — VERAPAMIL HYDROCHLORIDE 2.5 MG/ML
INJECTION, SOLUTION INTRAVENOUS
Status: DISCONTINUED | OUTPATIENT
Start: 2020-04-27 | End: 2020-04-27 | Stop reason: HOSPADM

## 2020-04-27 RX ORDER — LEVOTHYROXINE SODIUM 75 UG/1
75 TABLET ORAL DAILY
Status: CANCELLED | OUTPATIENT
Start: 2020-04-27

## 2020-04-27 RX ORDER — ACETAMINOPHEN 325 MG/1
650 TABLET ORAL EVERY 4 HOURS PRN
Status: DISCONTINUED | OUTPATIENT
Start: 2020-04-27 | End: 2020-04-27 | Stop reason: HOSPADM

## 2020-04-27 RX ORDER — ROSUVASTATIN CALCIUM 20 MG/1
20 TABLET, COATED ORAL DAILY
Status: CANCELLED | OUTPATIENT
Start: 2020-04-27

## 2020-04-27 RX ORDER — POTASSIUM CHLORIDE 1500 MG/1
20 TABLET, EXTENDED RELEASE ORAL
Status: COMPLETED | OUTPATIENT
Start: 2020-04-27 | End: 2020-04-27

## 2020-04-27 RX ORDER — NALOXONE HYDROCHLORIDE 0.4 MG/ML
.2-.4 INJECTION, SOLUTION INTRAMUSCULAR; INTRAVENOUS; SUBCUTANEOUS
Status: DISCONTINUED | OUTPATIENT
Start: 2020-04-27 | End: 2020-04-27 | Stop reason: HOSPADM

## 2020-04-27 RX ORDER — FENTANYL CITRATE 50 UG/ML
25-50 INJECTION, SOLUTION INTRAMUSCULAR; INTRAVENOUS
Status: DISCONTINUED | OUTPATIENT
Start: 2020-04-27 | End: 2020-04-27 | Stop reason: HOSPADM

## 2020-04-27 RX ORDER — NALOXONE HYDROCHLORIDE 0.4 MG/ML
.1-.4 INJECTION, SOLUTION INTRAMUSCULAR; INTRAVENOUS; SUBCUTANEOUS
Status: DISCONTINUED | OUTPATIENT
Start: 2020-04-27 | End: 2020-04-27 | Stop reason: HOSPADM

## 2020-04-27 RX ADMIN — SODIUM CHLORIDE: 9 INJECTION, SOLUTION INTRAVENOUS at 07:04

## 2020-04-27 RX ADMIN — MIDAZOLAM 1 MG: 1 INJECTION INTRAMUSCULAR; INTRAVENOUS at 08:51

## 2020-04-27 RX ADMIN — NITROGLYCERIN 200 MCG: 5 INJECTION, SOLUTION INTRAVENOUS at 08:54

## 2020-04-27 RX ADMIN — MIDAZOLAM 1 MG: 1 INJECTION INTRAMUSCULAR; INTRAVENOUS at 08:56

## 2020-04-27 RX ADMIN — POTASSIUM CHLORIDE 20 MEQ: 1500 TABLET, EXTENDED RELEASE ORAL at 08:15

## 2020-04-27 RX ADMIN — IOPAMIDOL 70 ML: 755 INJECTION, SOLUTION INTRAVENOUS at 09:04

## 2020-04-27 RX ADMIN — FENTANYL CITRATE 50 MCG: 50 INJECTION, SOLUTION INTRAMUSCULAR; INTRAVENOUS at 08:51

## 2020-04-27 RX ADMIN — MIDAZOLAM 1 MG: 1 INJECTION INTRAMUSCULAR; INTRAVENOUS at 09:03

## 2020-04-27 RX ADMIN — MIDAZOLAM 1 MG: 1 INJECTION INTRAMUSCULAR; INTRAVENOUS at 08:49

## 2020-04-27 RX ADMIN — HEPARIN SODIUM 5000 UNITS: 1000 INJECTION INTRAVENOUS; SUBCUTANEOUS at 08:56

## 2020-04-27 RX ADMIN — VERAPAMIL HYDROCHLORIDE 2.5 MG: 2.5 INJECTION, SOLUTION INTRAVENOUS at 08:54

## 2020-04-27 RX ADMIN — FENTANYL CITRATE 50 MCG: 50 INJECTION, SOLUTION INTRAMUSCULAR; INTRAVENOUS at 08:48

## 2020-04-27 ASSESSMENT — MIFFLIN-ST. JEOR: SCORE: 1128.7

## 2020-04-27 NOTE — PRE-PROCEDURE
GENERAL PRE-PROCEDURE:   Procedure:  Coronary angiogram, left heart catheterization, left ventriculogram and possible intervention.    Written consent obtained?: Yes    Risks and benefits: Risks, benefits and alternatives were discussed    Consent given by:  Patient  Patient states understanding of procedure being performed: Yes    Patient's understanding of procedure matches consent: Yes    Procedure consent matches procedure scheduled: Yes    Expected level of sedation:  Moderate  Appropriately NPO:  Yes  ASA Class:  Class 2- mild systemic disease, no acute problems, no functional limitations  Mallampati  :  Grade 1- soft palate, uvula, tonsillar pillars, and posterior pharyngeal wall visible  Lungs:  Lungs clear with good breath sounds bilaterally  Heart:  Normal heart sounds and rate and systolic murmur  History & Physical reviewed:  History and physical reviewed and no updates needed  Statement of review:  I have reviewed the lab findings, diagnostic data, medications, and the plan for sedation

## 2020-04-27 NOTE — CONSULTS
[facility]  Cardiology Consultation     Date of Admission:  4/27/2020    Primary Care Physician   Caprice Lares    Reason for Consult   Reason for consult: I was asked to evaluate this patient for consideration of coronary angiography    History of Present Illness   Renea Lerma is a 69 year old woman who follows with my partner Dr. Kathia Casey.  She has a 3 to 4-month history of decreasing exercise tolerance and worsening shortness of breath.  Stress echo from 2017 is normal.  Echocardiogram from December 2019 also demonstrates normal left ventricular function.  She had a CT angiogram from December 27 that came back with calcium score putting in the 73rd percentile for risk, moderate mid LAD stenosis and a sending aorta 4.3 x 4.2.    She is scheduled for coronary angiogram on March 22 however this was canceled because of Covid.  She ended up being admitted and I met her on the 24th where symptoms were consistent with a viral syndrome.  She remained in the hospital 3 days waiting for coronary angiogram Covid results had not come back.  Ultimately they did come back negative although she had already left the hospital.  Patient now occasionally has exertional chest discomfort.  She also has occasional rest episodes.  She mostly complains of palpitations with her Zio patch having come off this past Friday.  She now returns to the cardiac catheterization lab for her coronary angiogram.    Assessment & Plan   Renea Lerma is a 70 year old female with her abnormal CT angiogram and her chest pain and palpitations now comes to cardiac catheterization lab for further evaluation treatment.  I discussed risks, benefits and alternatives with the patient.  She appears to understand desires to proceed.  She appears to be appropriate patient for conscious sedation.  She also appears to be appropriate for long-term dual antiplatelet therapy if indicated.    We will follow-up on her Zio patch as an  outpatient.    Donaldo Rosen MD    Patient Active Problem List   Diagnosis     Esophageal reflux     Mild intermittent asthma     Hypothyroidism     Fibrocystic breast disease, unspecified laterality     Nonrheumatic aortic valve insufficiency     Thoracic aortic aneurysm without rupture (H)     Mixed hyperlipidemia     Shoulder arthritis     Vitamin D deficiency     Lung nodule     Dyspnea on exertion     Nutcracker esophagus     Schatzki's ring of distal esophagus     Aortic root dilatation (H)     Chronic hoarseness     Dysphagia     Coronary artery disease involving native heart without angina pectoris, unspecified vessel or lesion type     Elevated coronary artery calcium score     ACS (acute coronary syndrome) (H)     Chest pain       Past Medical History   I have reviewed this patient's medical history and updated it with pertinent information if needed.   Past Medical History:   Diagnosis Date     Aortic root dilatation (H)      Asthma      BCC (basal cell carcinoma of skin) 2010    rt shoulder     Chronic hoarseness     congential vocal chord closure issue     Dysphagia     esophageal dilatation per [facility] 2/2019     Elevated coronary artery calcium score     CTa calcium score total 93.5 / 73rd percentile     Endometrial ca (H)     stage 1     Family hx of colon cancer      GERD (gastroesophageal reflux disease)      Hormone replacement therapy      Hyperlipidemia LDL goal <130 11/4/2016     Hypothyroidism      Nonrheumatic aortic valve insufficiency 11/4/2016     Nutcracker esophagus      Schatzki's ring of distal esophagus      Shoulder arthritis     rt     Thoracic aortic aneurysm without rupture (H)     4.3 cm     Vitamin D deficiency 11/4/2016       Past Surgical History   I have reviewed this patient's surgical history and updated it with pertinent information if needed.  Past Surgical History:   Procedure Laterality Date     BIOPSY       C VAG HYST,UTERUS >250 GMS,REM TUBE/OVARY  2000      COLONOSCOPY       COLONOSCOPY N/A 12/12/2016    Procedure: COLONOSCOPY;  Surgeon: Manjinder Vera MD;  Location:  GI     TONSILLECTOMY         Prior to Admission Medications   Prior to Admission Medications   Prescriptions Last Dose Informant Patient Reported? Taking?   albuterol (PROAIR HFA/PROVENTIL HFA/VENTOLIN HFA) 108 (90 Base) MCG/ACT inhaler More than a month at Unknown time  No No   Sig: Inhale 2 puffs into the lungs every 6 hours as needed for shortness of breath / dyspnea or wheezing   aspirin (ASA) 81 MG EC tablet 4/27/2020 at Unknown time  Yes Yes   Sig: Take 1 tablet (81 mg) by mouth daily   cholecalciferol (VITAMIN D3) 5000 units (125 mcg) capsule 4/26/2020 at Unknown time  Yes Yes   Sig: Take 5,000 Units by mouth once a week   estradiol (VIVELLE-DOT) 0.0375 MG/24HR BIW patch 4/23/2020  No No   Sig: Place 1 patch onto the skin twice a week   Patient taking differently: Place 1 patch onto the skin once a week    fluticasone (FLONASE) 50 MCG/ACT spray More than a month at Unknown time  Yes No   Sig: Spray 1 spray into both nostrils daily as needed    levothyroxine (SYNTHROID/LEVOTHROID) 75 MCG tablet 4/27/2020 at Unknown time  No Yes   Sig: Take 1 tablet (75 mcg) by mouth daily   metoprolol tartrate (LOPRESSOR) 25 MG tablet 4/26/2020 at Unknown time  No Yes   Sig: Take 0.5 tablets (12.5 mg) by mouth 2 times daily   multivitamin, therapeutic with minerals (MULTI-VITAMIN) TABS tablet 4/26/2020 at Unknown time  No Yes   Sig: Take 1 tablet by mouth daily   rosuvastatin (CRESTOR) 20 MG tablet 4/26/2020 at Unknown time  Yes Yes   Sig: Take 20 mg by mouth daily      Facility-Administered Medications: None     Current Facility-Administered Medications   Medication Dose Route Frequency     aspirin  81 mg Oral Daily     sodium chloride (PF)  3 mL Intracatheter Q8H     Current Facility-Administered Medications   Medication Last Rate     - MEDICATION INSTRUCTIONS -       sodium chloride 150 mL/hr at 04/27/20 0704  "    Allergies   Allergies   Allergen Reactions     Dust Mites      Mold      No Known Drug Allergies      Seasonal Allergies      Adhesive Tape Rash       Social History    reports that she has never smoked. She has never used smokeless tobacco. She reports current alcohol use. She reports that she does not use drugs.    Family History   Family History   Problem Relation Age of Onset     Diabetes Father        Review of Systems   The comprehensive 10 point Review of Systems is negative other than noted in the HPI or here.     Physical Exam   Vital Signs with Ranges  Temp:  [97.9  F (36.6  C)] 97.9  F (36.6  C)  Pulse:  [68] 68  Resp:  [18] 18  BP: ()/(51-58) 98/51  SpO2:  [99 %] 99 %  Wt Readings from Last 4 Encounters:   04/27/20 64 kg (141 lb)   03/27/20 62.4 kg (137 lb 9.6 oz)   03/05/20 64.5 kg (142 lb 4.8 oz)   02/20/20 62.6 kg (138 lb)     No intake/output data recorded.      Vitals: BP 98/51 (BP Location: Left arm)   Pulse 68   Temp 97.9  F (36.6  C) (Oral)   Resp 18   Ht 1.6 m (5' 3\")   Wt 64 kg (141 lb)   SpO2 99%   BMI 24.98 kg/m      Patient is comfortable and in no apparent distress. Awake, alert and oriented ×3. Somewhat anxious.  Pupils are equal round and reactive.  Sclerae anicteric conjunctiva is noninjected  Neck is supple there are no carotid bruits or jugular venous distention.  Chest is clear to auscultation.  There is no kyphosis or scoliosis  Cardiac exam reveals a regular rate and rhythm 1/6 to 2/6 systolic ejection murmur best heard at the right upper sternal border.  Abdomen is soft nontender with normoactive bowel sounds.  Extremities are without edema.  There are 2+ pulses.  Neurologic exam is nonfocal.  Skin is warm and dry.      No lab results found in last 7 days.    Invalid input(s): TROPONINIES    Recent Labs   Lab 04/27/20  0705   WBC 5.5   HGB 11.6*   MCV 89      INR 1.05      POTASSIUM 3.7   CHLORIDE 111*   CO2 25   BUN 12   CR 0.74   GFRESTIMATED 82 "   GFRESTBLACK >90   ANIONGAP 5   HAWK 9.2   GLC 88     Recent Labs   Lab Test 02/20/20  0952 11/04/19  0845  04/17/12   CHOL 242* 241*   < > 211*   HDL 82 83   < > 67   * 140*   < > 131   TRIG 102 91   < > 65   CHOLHDLRATIO  --   --   --  3.15    < > = values in this interval not displayed.     Recent Labs   Lab 04/27/20  0705   WBC 5.5   HGB 11.6*   HCT 33.8*   MCV 89        No results for input(s): PH, PHV, PO2, PO2V, SAT, PCO2, PCO2V, HCO3, HCO3V in the last 168 hours.  No results for input(s): NTBNPI, NTBNP in the last 168 hours.  No results for input(s): DD in the last 168 hours.  No results for input(s): SED, CRP in the last 168 hours.  Recent Labs   Lab 04/27/20  0705        No results for input(s): TSH in the last 168 hours.  No results for input(s): COLOR, APPEARANCE, URINEGLC, URINEBILI, URINEKETONE, SG, UBLD, URINEPH, PROTEIN, UROBILINOGEN, NITRITE, LEUKEST, RBCU, WBCU in the last 168 hours.    Imaging:  No results found for this or any previous visit (from the past 48 hour(s)).    Echo:  No results found for this or any previous visit (from the past 4320 hour(s)).

## 2020-04-27 NOTE — PROGRESS NOTES
Care Suites Post Procedure Note    Patient Information  Name: Renea Lerma  Age: 70 year old    Post Procedure  Procedure: heart cath  Time patient returned to Care Suites: 0915  Concerns/abnormal assessment: no  If abnormal assessment, provider notified: N/A  Plan/Other: discharge 3595    Timothy Avina RN

## 2020-04-27 NOTE — PROGRESS NOTES
Care Suites Discharge Nursing Note    Patient Information  Name: Renea Lerma  Age: 70 year old    Discharge Education:  Discharge instructions reviewed: Yes  Additional education/resources provided: na  Patient/patient representative verbalizes understanding: Yes per Timothy FISHER  Patient discharging on new medications: No  Medication education completed: N/A    Discharge Plans:   Discharge location: home  Discharge ride contacted: Yes  Approximate discharge time: 1145    Discharge Criteria:  Discharge criteria met and vital signs stable: Yes    Patient Belongs:  Patient belongings returned to patient: Yes    John Pickard RN

## 2020-04-27 NOTE — DISCHARGE INSTRUCTIONS
Cardiac Angiogram Discharge Instructions - Radial    After you go home:      Have an adult stay with you until tomorrow.    Drink extra fluids for 2 days.    You may resume your normal diet.    No smoking       For 24 hours - due to the sedation you received:    Relax and take it easy.    Do NOT make any important or legal decisions.    Do NOT drive or operate machines at home or at work.    Do NOT drink alcohol.    Care of Wrist Puncture Site:      For the first 24 hrs - check the puncture site every 1-2 hours while awake.    It is normal to have soreness at the puncture site and mild tingling in your hand for up to 3 days.    Remove the bandaid after 24 hours. If there is minor oozing, apply another bandaid and remove it after 12 hours.    You may shower tomorrow.  Do NOT take a bath, or use a hot tub or pool for at least 3 days. Do NOT scrub the site. Do not use lotion or powder near the puncture site.           Activity:        For 2 days:     do not use your hand or arm to support your weight (such as rising from a chair)     do not bend your wrist (such as lifting a garage door).    do not lift more than 5 pounds or exercise your arm (such as tennis, golf or bowling).    Do NOT do any heavy activity such as exercise, lifting, or straining.     Bleeding:      If you start bleeding from the site in your wrist, sit down and press firmly on/above the site for 10 minutes.     Once bleeding stops, keep arm still for 2 hours.     Call [facility] as soon as you can.       Call 911 right away if you have heavy bleeding or bleeding that does not stop.      Medicines:      If you are taking an antiplatelet medication such as Plavix, Brilinta or Effient, do not stop taking it until you talk to your cardiologist.        If you are on Metformin (Glucophage), do not restart it until you have blood tests (within 2 to 3 days after discharge).  After you have your blood drawn, you may restart the Metformin.     Take your  medications, including blood thinners, unless your provider tells you not to.  If you take Coumadin (Warfarin), have your INR checked by your provider in  3-5 days. Call your clinic to schedule this.    If you have stopped any medicines, check with your provider about when to restart them.    Follow Up Appointments:      Follow up with [facility] Nurse Practitioner at [facility] of patient preference in 7-10 days.    Call the clinic if:      You have a large or growing hard lump around the site.    The site is red, swollen, hot or tender.    Blood or fluid is draining from the site.    You have chills or a fever greater than 101 F (38 C).    Your arm feels numb, cool or changes color.    You have hives, a rash or unusual itching.    Any questions or concerns.          [facility] [place]:    [phone] [facility] (7 days a week)

## 2020-04-28 LAB — INTERPRETATION ECG - MUSE: NORMAL

## 2020-05-11 ENCOUNTER — TELEPHONE (OUTPATIENT)
Dept: CARDIOLOGY | Facility: CLINIC | Age: 70
End: 2020-05-11

## 2020-05-11 NOTE — TELEPHONE ENCOUNTER
Patient called to ask about the results of her ziopatch. Reviewed that it showed 3 runs of up to 11 beats of SVT. Patient states she is tolerating the low dose lopressor BID although she worries about taking a blood pressure med when her readings are usually low. Reviewed with patient that the metoprolol was added to discourage the SVT and to help her heart pump with good control. She will discuss further with LOLA Bustos on 5/20/2020.

## 2020-05-18 DIAGNOSIS — I25.10 CORONARY ARTERY DISEASE INVOLVING NATIVE HEART WITHOUT ANGINA PECTORIS, UNSPECIFIED VESSEL OR LESION TYPE: ICD-10-CM

## 2020-05-18 RX ORDER — METOPROLOL TARTRATE 25 MG/1
12.5 TABLET, FILM COATED ORAL 2 TIMES DAILY
Qty: 30 TABLET | Refills: 0 | Status: SHIPPED | OUTPATIENT
Start: 2020-05-18 | End: 2020-05-20

## 2020-05-20 ENCOUNTER — VIRTUAL VISIT (OUTPATIENT)
Dept: CARDIOLOGY | Facility: CLINIC | Age: 70
End: 2020-05-20
Payer: COMMERCIAL

## 2020-05-20 DIAGNOSIS — I25.10 CORONARY ARTERY DISEASE INVOLVING NATIVE HEART WITHOUT ANGINA PECTORIS, UNSPECIFIED VESSEL OR LESION TYPE: Primary | ICD-10-CM

## 2020-05-20 PROCEDURE — 99214 OFFICE O/P EST MOD 30 MIN: CPT | Mod: TEL | Performed by: PHYSICIAN ASSISTANT

## 2020-05-20 NOTE — LETTER
5/20/2020    Caprice Lares MD  [address]  [place] [address]    RE: Renea Lerma       Dear Colleague,    I had the pleasure of seeing Renea Lerma in the [facility].    Renea eLrma presents for a post-angiogram follow up visit    HPI: (Please see Dr. Casey's note from 4/9/2020 for the patient's detailed history)  The patient has a pertinent cardiac history of the following -   #    In brief,   Renea underwent coronary angiogram on 4/27/20.   1. 30 to 40% stenosis of the proximal left anterior descending artery involving the takeoff of the first diagonal. First diagonal appears to have an ostial 50% narrowing.  2. 30% ostial narrowing in the first obtuse marginal.  3. Left ventricular end-diastolic pressure of 19 mmHg. Normal left ventricular function estimated ejection fraction of 55%. No mitral regurgitation or aortic stenosis.  4. Short bursts of PAT were noted on monitor.    Zio monitor (x7d) showed short runs of SVT (up to 11 beats), otherwise SR with an average HR of 68 bpm.    Since started on statin therapy, her LDL has come down from 140 --> 51.  Component      Latest Ref Rng & Units 2/20/2020 4/27/2020   Cholesterol      <200 mg/dL 242 (H) 146   Triglycerides      <150 mg/dL 102 73   HDL Cholesterol      >49 mg/dL 82 80   LDL Cholesterol Calculated      <100 mg/dL 140 (H) 51   Non HDL Cholesterol      <130 mg/dL 160 (H) 66       She feels well today.  She states that her symptoms have essentially resolved following her hospitalization. The chest tightness has markedly improved. She has occasional palpitations that are more pronounced at night.  She has been walking without any limitations but notices that her stamina while biking might not be what it used to be. Denies CHF sxs. She has been compliant with her rosuvastatin and metoprolol therapy. However she tells me she has had no change in her symptoms with metoprolol and developed headaches with  initiation of the med. Overall her palpitations are rare and intermittent.     ROS:  12-pt ROS is negative except for as noted above.    EXAM:  A limited exam was conducted via video.  Constitutional: Patient is pleasant, alert, in no distress. Normal body habitus, upright.  ENT: Membranes moist, no nasal discharge or bleeding gums. Normal head shape, no evidence of injury or laceration.  EYES: No scleral icterus, normal conjunctivae. EOM's appear intact.   Neck: No evidence of thyromegaly.   Chest/Lungs: Appears to have normal respiratory effort. No audible wheezing, no cough, equal chest wall expansion.   Cardiovascular: No evidence of elevated JVP. No evidence of pitting edema bilaterally.  Abdomen: No evidence of abdominal distention. No observed jaundice.  Extremities: No edema, erythema, cyanosis noted.  Skin: No rashes or lesions appreciated on visualized skin, normal skin color.  Neurologic: Normal mentation. Normal arm motion bilaterally, no tremors. No evidence of focal defect.  Psychiatric: Appropriate affect. A&Ox3.    Assessment/Plan:  1. Mild CAD - on asa, statin. Denies angina.   2. Hyperlipidemia - pristine on Crestor.   3. Short runs of SVT - TFT's in 11/2019 WNL. No symptomatic benefit on metoprolol 12.5. We will stop it. Reassurance offered.   4. DALEY - suspect deconditioning, but if this persists we will trial hydrochlorothiazide.     Follow-up: October with Dr. Casey + lipid panel and ALT (she requests to reassess to ensure lipid panel numbers have not fallen significantly further).     Charisse Bustos PA-C  [facility]  Pager: [phone]  Text Page  (7:30am - 4pm M-F)         Thank you for allowing me to participate in the care of your patient.    Sincerely,     Charisse Bustos PA-C     [facility]

## 2020-05-20 NOTE — PROGRESS NOTES
"Renea Lerma is a 70 year old female who is being evaluated via a billable video visit.      The patient has been notified of following:     \"This video visit will be conducted via a call between you and your physician/provider. We have found that certain health care needs can be provided without the need for an in-person physical exam.  This service lets us provide the care you need with a video conversation.  If a prescription is necessary we can send it directly to your pharmacy.  If lab work is needed we can place an order for that and you can then stop by our lab to have the test done at a later time.    Video visits are billed at different rates depending on your insurance coverage.  Please reach out to your insurance provider with any questions.    If during the course of the call the physician/provider feels a video visit is not appropriate, you will not be charged for this service.\"    Patient has given verbal consent for Video visit? Yes    How would you like to obtain your AVS? [facility]    Patient would like the video invitation sent by: Send to e-mail at: [email or web address]    Will anyone else be joining your video visit? No     Review Of Systems  Skin: NEGATIVE  Eyes:Ears/Nose/Throat: NEGATIVE  Respiratory: Feels breath is more shallow since angio, DALEY   Cardiovascular: tightness is there w/activity   Gastrointestinal: NEGATIVE  Genitourinary:NEGATIVE   Musculoskeletal: muscle Soreness  Neurologic: headaches  Psychiatric: NEGATIVE  Hematologic/Lymphatic/Immunologic: NEGATIVE  Endocrine:  NEGATIVE  Vitals: Pt can't take BP or Pulse. No weight today. Doesn't have a scale.  Bianca Akbar LPN    Video-Visit Details - switched to telephone visit due to technical difficulties    Phone visit time: 35 minutes  ___________  I have reviewed and updated the patient's Past Medical History, Social History, Family History and Medication List.    PROBLEM LIST  Patient Active Problem List   Diagnosis "     Esophageal reflux     Mild intermittent asthma     Hypothyroidism     Fibrocystic breast disease, unspecified laterality     Nonrheumatic aortic valve insufficiency     Thoracic aortic aneurysm without rupture (H)     Mixed hyperlipidemia     Shoulder arthritis     Vitamin D deficiency     Lung nodule     Dyspnea on exertion     Nutcracker esophagus     Schatzki's ring of distal esophagus     Aortic root dilatation (H)     Chronic hoarseness     Dysphagia     Coronary artery disease involving native heart without angina pectoris, unspecified vessel or lesion type     Elevated coronary artery calcium score     ACS (acute coronary syndrome) (H)     Chest pain     Status post coronary angiogram       ALLERGIES  Dust mites; Mold; No known drug allergies; Seasonal allergies; and Adhesive tape    MEDICATIONS  Current Outpatient Medications   Medication Sig Dispense Refill     albuterol (PROAIR HFA/PROVENTIL HFA/VENTOLIN HFA) 108 (90 Base) MCG/ACT inhaler Inhale 2 puffs into the lungs every 6 hours as needed for shortness of breath / dyspnea or wheezing 1 Inhaler 1     aspirin (ASA) 81 MG EC tablet Take 1 tablet (81 mg) by mouth daily       cholecalciferol (VITAMIN D3) 5000 units (125 mcg) capsule Take 5,000 Units by mouth once a week       estradiol (VIVELLE-DOT) 0.0375 MG/24HR BIW patch Place 1 patch onto the skin twice a week (Patient taking differently: Place 1 patch onto the skin once a week ) 24 patch 3     fluticasone (FLONASE) 50 MCG/ACT spray Spray 1 spray into both nostrils daily as needed        levothyroxine (SYNTHROID/LEVOTHROID) 75 MCG tablet Take 1 tablet (75 mcg) by mouth daily 90 tablet 3     metoprolol tartrate (LOPRESSOR) 25 MG tablet Take 0.5 tablets (12.5 mg) by mouth 2 times daily 30 tablet 0     multivitamin, therapeutic with minerals (MULTI-VITAMIN) TABS tablet Take 1 tablet by mouth daily 30 each 11     rosuvastatin (CRESTOR) 20 MG tablet Take 20 mg by mouth daily         Renea Lerma  presents for a post-angiogram follow up visit    HPI: (Please see Dr. Casey's note from 4/9/2020 for the patient's detailed history)  The patient has a pertinent cardiac history of the following -   # Mild CAD  # Dilated ascending thoracic aorta  # Hyperlipidemia  # Paroxysmal SVT    In brief, Renea presents today for follow up of coronary angiogram and zio monitor, performed for episodes of exertional chest discomfort and palpitations.     Renea underwent coronary angiogram on 4/27/20.   1. 30 to 40% stenosis of the proximal left anterior descending artery involving the takeoff of the first diagonal. First diagonal appears to have an ostial 50% narrowing.  2. 30% ostial narrowing in the first obtuse marginal.  3. Left ventricular end-diastolic pressure of 19 mmHg. Normal left ventricular function estimated ejection fraction of 55%. No mitral regurgitation or aortic stenosis.  4. Short bursts of PAT were noted on monitor.    Zio monitor (x7d) showed short runs of SVT (up to 11 beats), otherwise SR with an average HR of 68 bpm.    Since started on statin therapy, her LDL has come down from 140 --> 51, which is fantastic.  Component      Latest Ref Rng & Units 2/20/2020 4/27/2020   Cholesterol      <200 mg/dL 242 (H) 146   Triglycerides      <150 mg/dL 102 73   HDL Cholesterol      >49 mg/dL 82 80   LDL Cholesterol Calculated      <100 mg/dL 140 (H) 51   Non HDL Cholesterol      <130 mg/dL 160 (H) 66       She feels well today.  She states that her symptoms have essentially resolved following her hospitalization. The chest tightness has markedly improved. She has occasional palpitations that are more pronounced at night.  She has been walking without any limitations but notices that her stamina while biking might not be what it used to be. Denies CHF sxs. She has been compliant with her rosuvastatin and metoprolol therapy. However she tells me she has had no change in her symptoms with metoprolol and developed headaches  with initiation of the med. Overall her palpitations are rare and intermittent.     ROS:  12-pt ROS is negative except for as noted above.    Assessment/Plan:  1. Mild CAD - on asa, statin. Denies angina. Routine exercise encouraged.  2. Hyperlipidemia - pristine on Crestor.   3. Short runs of SVT - TFT's in 11/2019 WNL. No symptomatic benefit on metoprolol 12.5. We will stop it. Reassurance offered.   4. DALEY - suspect deconditioning, but if this persists we could trial low-dose hydrochlorothiazide (although LVEDP was only 19 on angio). She will alert me in that case.    Follow-up: October with Dr. Casey + lipid panel and ALT (she requests to reassess to ensure lipid panel numbers have not fallen significantly further).     Charisse Bustos PA-C  [facility]  Pager: [phone]  Text Page  (7:30am - 4pm M-F)

## 2020-05-20 NOTE — PATIENT INSTRUCTIONS
Today's Plan:   - Stop the metoprolol, as it isn't making you feel any better.   - Try to continue exercising routinely and see if your stamina improves. If it doesn't in another month, we could try a water pill to see if that helps.  - If everything goes well, please return to see Dr. Casey in 6 months with a repeat lipid panel.     If you have questions or concerns please call my nurse team at [phone].  Scheduling phone number: [phone]  For after hours urgent concerns call [phone] option 2.   Reminder: Please bring in all current medications, over the counter supplements and vitamin bottles to your next appointment.    It was a pleasure seeing you today!     Charisse Bustos PA-C

## 2020-05-20 NOTE — LETTER
5/20/2020    Caprice Lares MD  [address]  [place] [address]    RE: Renea Lerma       Dear Colleague,    I had the pleasure of seeing Renea Lerma in the [facility].    Renea Lerma is a 70 year old female who is being evaluated via a billable video visit.        Renea Lerma presents for a post-angiogram follow up visit    HPI: (Please see Dr. Casey's note from 4/9/2020 for the patient's detailed history)  The patient has a pertinent cardiac history of the following -   # Mild CAD  # Dilated ascending thoracic aorta  # Hyperlipidemia  # Paroxysmal SVT    In brief, Renea presents today for follow up of coronary angiogram and zio monitor, performed for episodes of exertional chest discomfort and palpitations.     Renea underwent coronary angiogram on 4/27/20.   1. 30 to 40% stenosis of the proximal left anterior descending artery involving the takeoff of the first diagonal. First diagonal appears to have an ostial 50% narrowing.  2. 30% ostial narrowing in the first obtuse marginal.  3. Left ventricular end-diastolic pressure of 19 mmHg. Normal left ventricular function estimated ejection fraction of 55%. No mitral regurgitation or aortic stenosis.  4. Short bursts of PAT were noted on monitor.    Zio monitor (x7d) showed short runs of SVT (up to 11 beats), otherwise SR with an average HR of 68 bpm.    Since started on statin therapy, her LDL has come down from 140 --> 51, which is fantastic.  Component      Latest Ref Rng & Units 2/20/2020 4/27/2020   Cholesterol      <200 mg/dL 242 (H) 146   Triglycerides      <150 mg/dL 102 73   HDL Cholesterol      >49 mg/dL 82 80   LDL Cholesterol Calculated      <100 mg/dL 140 (H) 51   Non HDL Cholesterol      <130 mg/dL 160 (H) 66       She feels well today.  She states that her symptoms have essentially resolved following her hospitalization. The chest tightness has markedly improved. She has occasional palpitations that  are more pronounced at night.  She has been walking without any limitations but notices that her stamina while biking might not be what it used to be. Denies CHF sxs. She has been compliant with her rosuvastatin and metoprolol therapy. However she tells me she has had no change in her symptoms with metoprolol and developed headaches with initiation of the med. Overall her palpitations are rare and intermittent.     ROS:  12-pt ROS is negative except for as noted above.    Assessment/Plan:  1. Mild CAD - on asa, statin. Denies angina. Routine exercise encouraged.  2. Hyperlipidemia - pristine on Crestor.   3. Short runs of SVT - TFT's in 11/2019 WNL. No symptomatic benefit on metoprolol 12.5. We will stop it. Reassurance offered.   4. DALEY - suspect deconditioning, but if this persists we could trial low-dose hydrochlorothiazide (although LVEDP was only 19 on angio). She will alert me in that case.    Follow-up: October with Dr. Casey + lipid panel and ALT (she requests to reassess to ensure lipid panel numbers have not fallen significantly further).     Charisse Bustos PA-C  [facility]  Pager: [phone]  Text Page  (7:30am - 4pm M-F)         Thank you for allowing me to participate in the care of your patient.    Sincerely,     Charisse Bustos PA-C     [facility]

## 2020-06-04 DIAGNOSIS — E78.2 MIXED HYPERLIPIDEMIA: Primary | ICD-10-CM

## 2020-06-04 RX ORDER — ROSUVASTATIN CALCIUM 20 MG/1
20 TABLET, COATED ORAL DAILY
Qty: 90 TABLET | Refills: 1 | Status: SHIPPED | OUTPATIENT
Start: 2020-06-04 | End: 2020-08-20

## 2020-07-30 ENCOUNTER — OFFICE VISIT (OUTPATIENT)
Dept: DERMATOLOGY | Facility: CLINIC | Age: 70
End: 2020-07-30
Payer: COMMERCIAL

## 2020-07-30 DIAGNOSIS — Z85.828 HISTORY OF NONMELANOMA SKIN CANCER: ICD-10-CM

## 2020-07-30 DIAGNOSIS — D48.5 NEOPLASM OF UNCERTAIN BEHAVIOR OF SKIN: Primary | ICD-10-CM

## 2020-07-30 DIAGNOSIS — L82.1 SEBORRHEIC KERATOSIS: ICD-10-CM

## 2020-07-30 NOTE — PATIENT INSTRUCTIONS

## 2020-07-30 NOTE — PROGRESS NOTES
I talked with and examined Renea Lerma and I agree with the assessment and the plan. I was present for the biopsy procedure. VALDEMAR Seals MD.

## 2020-07-30 NOTE — LETTER
"7/30/2020       RE: Renea Lerma  [address] [address]     Dear Colleague,    Thank you for referring your patient, Renea Lerma, to the [facility] DERMATOLOGY at [facility]. Please see a copy of my visit note below.    [facility] Dermatology Note      Dermatology Problem List:    # NUB, Nape of neck, Roughly 9 mm rough pink papule with some crust and tenderness to palpation, dDx:  Inflamed benign keratosis versus NMSC, s/p shave biopsy on 7/30/2020    1. Hx of basal cell carcinoma  - hx of several BCC removals in [id number]  - several biopsies of various lesions since, all benign per pt    Encounter Date: Jul 30, 2020    CC:   Chief Complaint   Patient presents with     Derm Problem     Renea is here today for concern of spot on the back of her neck, pt states \"has been about 6 weeks since its starting to be really itching\"   Painful spot of neck    History of Present Illness:  Ms. Renea Lerma is a 70 year old female who presents for evaluation of a painful spot of the back of the neck.  She has a past history of basal cell carcinoma about 20 years ago.  She was last seen on December 31, 2018, when she was noted to have possible lymphangioma circumscriptum of the right upper arm.  The biopsies of this rash occurred outside of her system about 40 to 50 years ago and she is not sure of the diagnosis but this diagnosis seems familiar to her.  She was also noted at that time to have small, smooth, tan, subcentimeter papules, including of the neck, felt consistent with seborrheic keratoses or acrochordons.  Today, she notes a painful spot of the back of the neck.  Onset was about 6 weeks ago.  The associated symptom is pruritus.  She denies any constitutional symptoms or any other skin problems today.  No other modifying factors or associated symptoms.    Past Medical History:   Patient Active Problem List   Diagnosis     " Esophageal reflux     Mild intermittent asthma     Hypothyroidism     Fibrocystic breast disease, unspecified laterality     Nonrheumatic aortic valve insufficiency     Thoracic aortic aneurysm without rupture (H)     Mixed hyperlipidemia     Shoulder arthritis     Vitamin D deficiency     Lung nodule     Dyspnea on exertion     Nutcracker esophagus     Schatzki's ring of distal esophagus     Aortic root dilatation (H)     Chronic hoarseness     Dysphagia     Coronary artery disease involving native heart without angina pectoris, unspecified vessel or lesion type     Elevated coronary artery calcium score     ACS (acute coronary syndrome) (H)     Chest pain     Status post coronary angiogram     Past Medical History:   Diagnosis Date     Aortic root dilatation (H)      Asthma      BCC (basal cell carcinoma of skin) 2010    rt shoulder     Chronic hoarseness     congential vocal chord closure issue     Dysphagia     esophageal dilatation per [facility] 2/2019     Elevated coronary artery calcium score     CTa calcium score total 93.5 / 73rd percentile     Endometrial ca (H)     stage 1     Family hx of colon cancer      GERD (gastroesophageal reflux disease)      Hormone replacement therapy      Hyperlipidemia LDL goal <130 11/4/2016     Hypothyroidism      Nonrheumatic aortic valve insufficiency 11/4/2016     Nutcracker esophagus      Schatzki's ring of distal esophagus      Shoulder arthritis     rt     Thoracic aortic aneurysm without rupture (H)     4.3 cm     Vitamin D deficiency 11/4/2016     Past Surgical History:   Procedure Laterality Date     BIOPSY       C VAG HYST,UTERUS >250 GMS,REM TUBE/OVARY  2000     COLONOSCOPY       COLONOSCOPY N/A 12/12/2016    Procedure: COLONOSCOPY;  Surgeon: Manjinder Vera MD;  Location:  GI     CV CORONARY ANGIOGRAM N/A 4/27/2020    Procedure: Coronary Angiogram;  Surgeon: Donaldo Rosen MD;  Location:  HEART CARDIAC CATH LAB     CV LEFT HEART CATH N/A 4/27/2020     Procedure: Left Heart Cath;  Surgeon: Donaldo Rosen MD;  Location:  HEART CARDIAC CATH LAB     CV LEFT VENTRICULOGRAM N/A 4/27/2020    Procedure: Left Ventriculogram;  Surgeon: Donaldo Rosen MD;  Location:  HEART CARDIAC CATH LAB     TONSILLECTOMY         Social History:  Patient reports that she has never smoked. She has never used smokeless tobacco. She reports current alcohol use. She reports that she does not use drugs.    Family History:  Family History   Problem Relation Age of Onset     Diabetes Father        Medications:  Current Outpatient Medications   Medication Sig Dispense Refill     albuterol (PROAIR HFA/PROVENTIL HFA/VENTOLIN HFA) 108 (90 Base) MCG/ACT inhaler Inhale 2 puffs into the lungs every 6 hours as needed for shortness of breath / dyspnea or wheezing 1 Inhaler 1     aspirin (ASA) 81 MG EC tablet Take 1 tablet (81 mg) by mouth daily       cholecalciferol (VITAMIN D3) 5000 units (125 mcg) capsule Take 5,000 Units by mouth once a week       estradiol (VIVELLE-DOT) 0.0375 MG/24HR BIW patch Place 1 patch onto the skin twice a week (Patient taking differently: Place 1 patch onto the skin once a week ) 24 patch 3     fluticasone (FLONASE) 50 MCG/ACT spray Spray 1 spray into both nostrils daily as needed        levothyroxine (SYNTHROID/LEVOTHROID) 75 MCG tablet Take 1 tablet (75 mcg) by mouth daily 90 tablet 3     multivitamin, therapeutic with minerals (MULTI-VITAMIN) TABS tablet Take 1 tablet by mouth daily 30 each 11     rosuvastatin (CRESTOR) 20 MG tablet Take 1 tablet (20 mg) by mouth daily 90 tablet 1        Allergies   Allergen Reactions     Dust Mites      Mold      No Known Drug Allergies      Seasonal Allergies      Adhesive Tape Rash         Review of Systems:  -Skin Establ Pt: The patient denies any new rash, pruritus, or lesions that are symptomatic, changing or bleeding, except as per HPI.  -Constitutional: Otherwise feeling well today, in usual state of  health.  -HEENT: Patient denies nonhealing oral sores.  -Skin: As above in HPI. No additional skin concerns.    Physical exam:  Vitals: There were no vitals taken for this visit.  GEN: This is a well developed, well-nourished female in no acute distress, in a pleasant mood.    SKIN: Sun-exposed skin, which includes the head/face, neck, both arms, digits, and/or nails was examined.   -Pinzon skin type: II  - NUB, Nape of neck, Roughly 9 mm rough pink papule with some crust and tenderness to palpation  -Scattered brown macules on sun exposed areas.  -There are waxy stuck on tan to brown papules on the trunk and extremities.  -No other lesions of concern on areas examined.        Impression/Plan:  1. NUB, Nape of neck, Roughly 9 mm rough pink papule with some crust and tenderness to palpation, dDx:  Inflamed benign keratosis versus NMSC, s/p shave biopsy on 7/30/2020  - Shave biopsy:  After discussion of benefits and risks including but not limited to bleeding/bruising, pain/swelling, infection, scar, incomplete removal, nerve damage/numbness, recurrence, and non-diagnostic biopsy, written consent, verbal consent and photographs were obtained. Time-out was performed. The area was cleaned with isopropyl alcohol. 0.5ml of 1% lidocaine with 1:100,000 epinephrine was injected to obtain adequate anesthesia. A shave biopsy was performed. Hemostasis was achieved with aluminium chloride. Vaseline and a sterile dressing were applied. The patient tolerated the procedure and no complications were noted. The patient was provided with verbal and written post care instructions.    2. Seborrheic keratosis, non irritated and Solar lentigines, and history of NMSC:  - ABCDs of melanoma were discussed and self skin checks were advised. Sun precaution was advised including the use of sun screens of SPF 30 or higher, sun protective clothing, and avoidance of tanning beds.    CC Referred Self, MD  No address on file on close of this  encounter.  Follow-up in 1 year, earlier for new or changing lesions.     Dr. Seals staffed the patient.    Staff Involved:  Resident(Dr. Vega Garcia)/Staff      I talked with and examined Renea Lerma and I agree with the assessment and the plan. I was present for the biopsy procedure. VALDEMAR Seals MD.      Again, thank you for allowing me to participate in the care of your patient.      Sincerely,    CEDRIC Seals MD

## 2020-07-30 NOTE — NURSING NOTE
"Chief Complaint   Patient presents with     Derm Problem     Renea is here today for concern of spot on the back of her neck, pt states \"has been about 6 weeks since its starting to be really itching\"     Estela Rich LPN    "

## 2020-07-30 NOTE — PROGRESS NOTES
"[facility] Dermatology Note      Dermatology Problem List:    # NUB, Nape of neck, Roughly 9 mm rough pink papule with some crust and tenderness to palpation, dDx:  Inflamed benign keratosis versus NMSC, s/p shave biopsy on 7/30/2020    1. Hx of basal cell carcinoma  - hx of several BCC removals in [id number]  - several biopsies of various lesions since, all benign per pt    Encounter Date: Jul 30, 2020    CC:   Chief Complaint   Patient presents with     Derm Problem     Renea is here today for concern of spot on the back of her neck, pt states \"has been about 6 weeks since its starting to be really itching\"   Painful spot of neck    History of Present Illness:  Ms. Renea Lerma is a 70 year old female who presents for evaluation of a painful spot of the back of the neck.  She has a past history of basal cell carcinoma about 20 years ago.  She was last seen on December 31, 2018, when she was noted to have possible lymphangioma circumscriptum of the right upper arm.  The biopsies of this rash occurred outside of her system about 40 to 50 years ago and she is not sure of the diagnosis but this diagnosis seems familiar to her.  She was also noted at that time to have small, smooth, tan, subcentimeter papules, including of the neck, felt consistent with seborrheic keratoses or acrochordons.  Today, she notes a painful spot of the back of the neck.  Onset was about 6 weeks ago.  The associated symptom is pruritus.  She denies any constitutional symptoms or any other skin problems today.  No other modifying factors or associated symptoms.    Past Medical History:   Patient Active Problem List   Diagnosis     Esophageal reflux     Mild intermittent asthma     Hypothyroidism     Fibrocystic breast disease, unspecified laterality     Nonrheumatic aortic valve insufficiency     Thoracic aortic aneurysm without rupture (H)     Mixed hyperlipidemia     Shoulder arthritis     Vitamin D deficiency     Lung " nodule     Dyspnea on exertion     Nutcracker esophagus     Schatzki's ring of distal esophagus     Aortic root dilatation (H)     Chronic hoarseness     Dysphagia     Coronary artery disease involving native heart without angina pectoris, unspecified vessel or lesion type     Elevated coronary artery calcium score     ACS (acute coronary syndrome) (H)     Chest pain     Status post coronary angiogram     Past Medical History:   Diagnosis Date     Aortic root dilatation (H)      Asthma      BCC (basal cell carcinoma of skin) 2010    rt shoulder     Chronic hoarseness     congential vocal chord closure issue     Dysphagia     esophageal dilatation per [facility] 2/2019     Elevated coronary artery calcium score     CTa calcium score total 93.5 / 73rd percentile     Endometrial ca (H)     stage 1     Family hx of colon cancer      GERD (gastroesophageal reflux disease)      Hormone replacement therapy      Hyperlipidemia LDL goal <130 11/4/2016     Hypothyroidism      Nonrheumatic aortic valve insufficiency 11/4/2016     Nutcracker esophagus      Schatzki's ring of distal esophagus      Shoulder arthritis     rt     Thoracic aortic aneurysm without rupture (H)     4.3 cm     Vitamin D deficiency 11/4/2016     Past Surgical History:   Procedure Laterality Date     BIOPSY       C VAG HYST,UTERUS >250 GMS,REM TUBE/OVARY  2000     COLONOSCOPY       COLONOSCOPY N/A 12/12/2016    Procedure: COLONOSCOPY;  Surgeon: Manjinder Vera MD;  Location:  GI     CV CORONARY ANGIOGRAM N/A 4/27/2020    Procedure: Coronary Angiogram;  Surgeon: Donaldo Rosen MD;  Location:  HEART CARDIAC CATH LAB     CV LEFT HEART CATH N/A 4/27/2020    Procedure: Left Heart Cath;  Surgeon: Donaldo Rosen MD;  Location: [facility] CARDIAC CATH LAB     CV LEFT VENTRICULOGRAM N/A 4/27/2020    Procedure: Left Ventriculogram;  Surgeon: Donaldo Rosen MD;  Location:  HEART CARDIAC CATH LAB     TONSILLECTOMY         Social History:  Patient  reports that she has never smoked. She has never used smokeless tobacco. She reports current alcohol use. She reports that she does not use drugs.    Family History:  Family History   Problem Relation Age of Onset     Diabetes Father        Medications:  Current Outpatient Medications   Medication Sig Dispense Refill     albuterol (PROAIR HFA/PROVENTIL HFA/VENTOLIN HFA) 108 (90 Base) MCG/ACT inhaler Inhale 2 puffs into the lungs every 6 hours as needed for shortness of breath / dyspnea or wheezing 1 Inhaler 1     aspirin (ASA) 81 MG EC tablet Take 1 tablet (81 mg) by mouth daily       cholecalciferol (VITAMIN D3) 5000 units (125 mcg) capsule Take 5,000 Units by mouth once a week       estradiol (VIVELLE-DOT) 0.0375 MG/24HR BIW patch Place 1 patch onto the skin twice a week (Patient taking differently: Place 1 patch onto the skin once a week ) 24 patch 3     fluticasone (FLONASE) 50 MCG/ACT spray Spray 1 spray into both nostrils daily as needed        levothyroxine (SYNTHROID/LEVOTHROID) 75 MCG tablet Take 1 tablet (75 mcg) by mouth daily 90 tablet 3     multivitamin, therapeutic with minerals (MULTI-VITAMIN) TABS tablet Take 1 tablet by mouth daily 30 each 11     rosuvastatin (CRESTOR) 20 MG tablet Take 1 tablet (20 mg) by mouth daily 90 tablet 1        Allergies   Allergen Reactions     Dust Mites      Mold      No Known Drug Allergies      Seasonal Allergies      Adhesive Tape Rash         Review of Systems:  -Skin Establ Pt: The patient denies any new rash, pruritus, or lesions that are symptomatic, changing or bleeding, except as per HPI.  -Constitutional: Otherwise feeling well today, in usual state of health.  -HEENT: Patient denies nonhealing oral sores.  -Skin: As above in HPI. No additional skin concerns.    Physical exam:  Vitals: There were no vitals taken for this visit.  GEN: This is a well developed, well-nourished female in no acute distress, in a pleasant mood.    SKIN: Sun-exposed skin, which  includes the head/face, neck, both arms, digits, and/or nails was examined.   -Pinzon skin type: II  - NUB, Nape of neck, Roughly 9 mm rough pink papule with some crust and tenderness to palpation  -Scattered brown macules on sun exposed areas.  -There are waxy stuck on tan to brown papules on the trunk and extremities.  -No other lesions of concern on areas examined.        Impression/Plan:  1. NUB, Nape of neck, Roughly 9 mm rough pink papule with some crust and tenderness to palpation, dDx:  Inflamed benign keratosis versus NMSC, s/p shave biopsy on 7/30/2020  - Shave biopsy:  After discussion of benefits and risks including but not limited to bleeding/bruising, pain/swelling, infection, scar, incomplete removal, nerve damage/numbness, recurrence, and non-diagnostic biopsy, written consent, verbal consent and photographs were obtained. Time-out was performed. The area was cleaned with isopropyl alcohol. 0.5ml of 1% lidocaine with 1:100,000 epinephrine was injected to obtain adequate anesthesia. A shave biopsy was performed. Hemostasis was achieved with aluminium chloride. Vaseline and a sterile dressing were applied. The patient tolerated the procedure and no complications were noted. The patient was provided with verbal and written post care instructions.    2. Seborrheic keratosis, non irritated and Solar lentigines, and history of NMSC:  - ABCDs of melanoma were discussed and self skin checks were advised. Sun precaution was advised including the use of sun screens of SPF 30 or higher, sun protective clothing, and avoidance of tanning beds.    CC Referred Self, MD  No address on file on close of this encounter.  Follow-up in 1 year, earlier for new or changing lesions.     Dr. Seals staffed the patient.    Staff Involved:  Resident(Dr. Vega Garcia)/Staff

## 2020-08-01 LAB — COPATH REPORT: NORMAL

## 2020-08-06 ENCOUNTER — VIRTUAL VISIT (OUTPATIENT)
Dept: FAMILY MEDICINE | Facility: CLINIC | Age: 70
End: 2020-08-06
Payer: COMMERCIAL

## 2020-08-06 DIAGNOSIS — M54.12 CERVICAL RADICULOPATHY: Primary | ICD-10-CM

## 2020-08-06 DIAGNOSIS — R06.00 DYSPNEA, UNSPECIFIED TYPE: ICD-10-CM

## 2020-08-06 DIAGNOSIS — I25.10 MILD CORONARY ARTERY DISEASE: ICD-10-CM

## 2020-08-06 DIAGNOSIS — I47.10 PAROXYSMAL SUPRAVENTRICULAR TACHYCARDIA (H): ICD-10-CM

## 2020-08-06 DIAGNOSIS — J45.20 MILD INTERMITTENT ASTHMA WITHOUT COMPLICATION: ICD-10-CM

## 2020-08-06 PROCEDURE — 99214 OFFICE O/P EST MOD 30 MIN: CPT | Mod: GT | Performed by: INTERNAL MEDICINE

## 2020-08-06 RX ORDER — ALBUTEROL SULFATE 90 UG/1
2 AEROSOL, METERED RESPIRATORY (INHALATION) EVERY 6 HOURS PRN
Qty: 1 INHALER | Refills: 1 | Status: SHIPPED | OUTPATIENT
Start: 2020-08-06

## 2020-08-06 NOTE — PROGRESS NOTES
"Renea Lerma is a 70 year old female who is being evaluated via a billable video visit.      The patient has been notified of following:     \"This video visit will be conducted via a call between you and your physician/provider. We have found that certain health care needs can be provided without the need for an in-person physical exam.  This service lets us provide the care you need with a video conversation.  If a prescription is necessary we can send it directly to your pharmacy.  If lab work is needed we can place an order for that and you can then stop by our lab to have the test done at a later time.    Video visits are billed at different rates depending on your insurance coverage.  Please reach out to your insurance provider with any questions.    If during the course of the call the physician/provider feels a video visit is not appropriate, you will not be charged for this service.\"    Patient has given verbal consent for Video visit? Yes  How would you like to obtain your AVS? MyChart  If you are dropped from the video visit, the video invite should be resent to: Text to cell phone: [phone]  Will anyone else be joining your video visit? No    Subjective     Renea Lerma is a 70 year old female who presents today via video visit for the following health issues:    HPI patient is having left arm pain   For the last 3 to 4 weeks which goes up to the hand  She has been working from home at her desk  And admits to poor posture at times    There is no loss of movement  Patient was admitted to hospital in April and had an angiogram done  She was having chest pain and shortness of breath and other imagings were not helpful finally the angiogram showed only mild coronary artery disease    She is on statin and aspirin and beta-blocker has been stopped because that was not helping her palpitations  She states that her dyspnea is also  better and she feels better    Musculoskeletal problem      Duration: x " 3 week     Description  Location: Left arm     Intensity:  moderate    Accompanying signs and symptoms: numbness and tingling    History  Previous similar problem: no   Previous evaluation:  none    Precipitating or alleviating factors:  Trauma or overuse: no   Aggravating factors include: lifting and moving arm increase symptoms     Therapies tried and outcome: massage - symptoms are persistent          Video Start Time: 4 PM        Patient Active Problem List   Diagnosis     Esophageal reflux     Mild intermittent asthma     Hypothyroidism     Fibrocystic breast disease, unspecified laterality     Nonrheumatic aortic valve insufficiency     Thoracic aortic aneurysm without rupture (H)     Mixed hyperlipidemia     Shoulder arthritis     Vitamin D deficiency     Lung nodule     Dyspnea on exertion     Nutcracker esophagus     Schatzki's ring of distal esophagus     Aortic root dilatation (H)     Chronic hoarseness     Dysphagia     Coronary artery disease involving native heart without angina pectoris, unspecified vessel or lesion type     Elevated coronary artery calcium score     ACS (acute coronary syndrome) (H)     Chest pain     Status post coronary angiogram     Past Surgical History:   Procedure Laterality Date     BIOPSY       C VAG HYST,UTERUS >250 GMS,REM TUBE/OVARY  2000     COLONOSCOPY       COLONOSCOPY N/A 12/12/2016    Procedure: COLONOSCOPY;  Surgeon: Manjinder Vera MD;  Location:  GI     CV CORONARY ANGIOGRAM N/A 4/27/2020    Procedure: Coronary Angiogram;  Surgeon: Donaldo Rosen MD;  Location:  HEART CARDIAC CATH LAB     CV LEFT HEART CATH N/A 4/27/2020    Procedure: Left Heart Cath;  Surgeon: Donaldo Rosen MD;  Location:  HEART CARDIAC CATH LAB     CV LEFT VENTRICULOGRAM N/A 4/27/2020    Procedure: Left Ventriculogram;  Surgeon: Donaldo Rosen MD;  Location:  HEART CARDIAC CATH LAB     TONSILLECTOMY         Social History     Tobacco Use     Smoking status: Never Smoker      Smokeless tobacco: Never Used   Substance Use Topics     Alcohol use: Yes     Comment: 1 glass of wine per week     Family History   Problem Relation Age of Onset     Diabetes Father            Reviewed and updated as needed this visit by Provider  Tobacco  Allergies  Meds  Problems  Med Hx  Surg Hx  Fam Hx         Review of Systems   10 point ROS of systems including Constitutional, Eyes, Respiratory, Cardiovascular, Gastroenterology, Genitourinary, Integumentary, Muscularskeletal, Psychiatric were all negative except for pertinent positives noted in my HPI.    Current Outpatient Medications:      albuterol (PROAIR HFA/PROVENTIL HFA/VENTOLIN HFA) 108 (90 Base) MCG/ACT inhaler, Inhale 2 puffs into the lungs every 6 hours as needed for shortness of breath / dyspnea or wheezing, Disp: 1 Inhaler, Rfl: 1     aspirin (ASA) 81 MG EC tablet, Take 1 tablet (81 mg) by mouth daily, Disp: , Rfl:      cholecalciferol (VITAMIN D3) 5000 units (125 mcg) capsule, Take 5,000 Units by mouth once a week, Disp: , Rfl:      estradiol (VIVELLE-DOT) 0.0375 MG/24HR BIW patch, Place 1 patch onto the skin twice a week (Patient taking differently: Place 1 patch onto the skin once a week ), Disp: 24 patch, Rfl: 3     fluticasone (FLONASE) 50 MCG/ACT spray, Spray 1 spray into both nostrils daily as needed , Disp: , Rfl:      levothyroxine (SYNTHROID/LEVOTHROID) 75 MCG tablet, Take 1 tablet (75 mcg) by mouth daily, Disp: 90 tablet, Rfl: 3     multivitamin, therapeutic with minerals (MULTI-VITAMIN) TABS tablet, Take 1 tablet by mouth daily, Disp: 30 each, Rfl: 11     rosuvastatin (CRESTOR) 20 MG tablet, Take 1 tablet (20 mg) by mouth daily, Disp: 90 tablet, Rfl: 1        Objective             Physical Exam     GENERAL: Healthy, alert and no distress  EYES: Eyes grossly normal to inspection.  No discharge or erythema, or obvious scleral/conjunctival abnormalities.  RESP: No audible wheeze, cough, or visible cyanosis.  No visible  "retractions or increased work of breathing.    SKIN: Visible skin clear. No significant rash, abnormal pigmentation or lesions.  NEURO: Cranial nerves grossly intact.  Mentation and speech appropriate for age.  PSYCH: Mentation appears normal, affect normal/bright, judgement and insight intact, normal speech and appearance well-groomed.      Office Visit on 07/30/2020   Component Date Value Ref Range Status     Copath Report 07/30/2020    Final                    Value:Patient Name: DEX AGUILAR  MR#: [id number]  Specimen #: [id number]  Collected: 7/30/2020  Received: 7/30/2020  Reported: 8/1/2020 19:20  Ordering Phy(s): CEDRIC LEE    For improved result formatting, select 'View Enhanced Report Format' under   Linked Documents section.    SPECIMEN(S):  Skin, nape of neck, shave    FINAL DIAGNOSIS:  Skin, nape of neck, shave:  - Seborrheic keratosis, inflamed - (see description)    I have personally reviewed all specimens and/or slides, including the   listed special stains, and used them  with my medical judgement to determine or confirm the final diagnosis.    Electronically signed out by:    Jeremiah Fuller M.D., [facility]    CLINICAL HISTORY:  The patient is a 70 year old female.    GROSS:  The specimen is received in formalin with proper patient identification,   labeled \"nape of neck\" and the  specimen consists of a single, irregular skin shave measuring 1.0 x 0.8   cm.  The skin surface displays a 0.8 x  0.5 x 0.1 cm tan lesion.  T                          he resection margin is inked black.  It is   trisected and entirely submitted in  cassette A1. (Dictated by: Matilde KAPOOR [facility] 7/30/2020 03:49 PM)    MICROSCOPIC:  The specimen exhibits compact orthokeratosis, papillomatous epidermal   hyperplasia with horn cyst formation and  a patchy lichenoid lymphocytic infiltrate.    The technical component of this testing was completed at the [facility] " [facility], with the professional component performed   at the [facility], [address],   [place] [address] ([phone])    CPT Codes:  A: 44099-GT7.P, 26783-UB5.T    COLLECTION SITE:  Client: [facility]  Location: [facility] (B)         LDL Cholesterol Calculated   Date Value Ref Range Status   04/27/2020 51 <100 mg/dL Final     Comment:     Desirable:       <100 mg/dl     Glucose   Date Value Ref Range Status   04/27/2020 88 70 - 99 mg/dL Final   03/24/2020 93 70 - 99 mg/dL Final   11/04/2019 87 70 - 99 mg/dL Final     Comment:     Fasting specimen   11/08/2018 83 70 - 99 mg/dL Final   12/04/2017 81 70 - 99 mg/dL Final     Creatinine   Date Value Ref Range Status   04/27/2020 0.74 0.52 - 1.04 mg/dL Final             Assessment & Plan     Renea was seen today for musculoskeletal problem.    Diagnoses and all orders for this visit:    Cervical radiculopathy  -     [facility] PT, HAND, AND CHIROPRACTIC REFERRAL; Future  -     XR Cervical Spine 2/3 Views; Future  -     Vitamin B12; Future    Mild coronary artery disease    Dyspnea, unspecified type    Paroxysmal supraventricular tachycardia (H)    Mild intermittent asthma without complication  -     albuterol (PROAIR HFA/PROVENTIL HFA/VENTOLIN HFA) 108 (90 Base) MCG/ACT inhaler; Inhale 2 puffs into the lungs every 6 hours as needed for shortness of breath / dyspnea or wheezing         I discussed the angiogram findings with her and about her PSVT which is quite stable at this point  She will continue aspirin and Crestor  She will take albuterol as needed  Her arm symptoms seem to be cervical radiculopathy and I taught her Andrae exercises  She will also do physical therapy and check a B12 level      Return for Lab Work Xray .    Caprice Lares MD  [facility]      Video-Visit Details    Type of service:  Video Visit    Video  End Time:4.20    Originating Location (pt. Location): Home    Distant Location (provider location):  [facility]     Platform used for Video Visit: Nereyda    Return for Lab Work Xray .       Caprice Lares MD

## 2020-08-17 ENCOUNTER — ANCILLARY PROCEDURE (OUTPATIENT)
Dept: GENERAL RADIOLOGY | Facility: CLINIC | Age: 70
End: 2020-08-17
Attending: INTERNAL MEDICINE
Payer: COMMERCIAL

## 2020-08-17 ENCOUNTER — TELEPHONE (OUTPATIENT)
Dept: FAMILY MEDICINE | Facility: CLINIC | Age: 70
End: 2020-08-17

## 2020-08-17 DIAGNOSIS — M54.12 CERVICAL RADICULOPATHY: ICD-10-CM

## 2020-08-17 LAB — VIT B12 SERPL-MCNC: 533 PG/ML (ref 193–986)

## 2020-08-17 PROCEDURE — 36415 COLL VENOUS BLD VENIPUNCTURE: CPT | Performed by: INTERNAL MEDICINE

## 2020-08-17 PROCEDURE — 72040 X-RAY EXAM NECK SPINE 2-3 VW: CPT

## 2020-08-17 PROCEDURE — 82607 VITAMIN B-12: CPT | Performed by: INTERNAL MEDICINE

## 2020-08-17 NOTE — TELEPHONE ENCOUNTER
"Came in to     Arm has been numb - Dr Lares aware, tingling    Saturday - in [place]. 2 wine glasses/4 hours. Didn't drink water. Played cards 2.5 hours. Both lower legs and feet \"so swollen\" (skin was all grey) and was very tight and painful. Walked and laid down on sofa, pillows under feet. After an hour, increased circulation/more normal.     Was warm outside, had a fan/AC inside where playing cards. Sat outside for 45mins, got hot and went in.     Has never had that before - sits at desk 3-4 hours before getting up     Legs are much better today, still can see \"creases\" from area where they were swollen. Yesterday driving back - had to stop twice and walk around - due to tightness in calf.     Today is much better - no redness or pain     Sodium? All ate the same food. Salt and pepper - beef tenderloins. Potato dish - wasn't much added salt, green beans, perla food cake with strawberries and ice cream - salad with dressing     Ate out more /more wine - different diet  Also bicycled 10-20 miles daily     Discussed hydration, limiting sodium, propping up legs while not using, and follow up with PCP     States her insurance has not been covering video or phone visits. But she'd be willing to come in for OV if needed.     Please advise     Jessica JULIO RN    "

## 2020-08-17 NOTE — TELEPHONE ENCOUNTER
"Reason for Call:  Other call back    Detailed comments: Pt came in to clinic today to have labs drawn. Patient would like a nurse to call her. Patient has \"some things\" she would like to discuss with a nurse    Phone Number Patient can be reached at: Home number on file [phone] (home)    Best Time:     Can we leave a detailed message on this number? YES    Call taken on 8/17/2020 at 3:21 PM by Gloria Swain    "

## 2020-08-19 ENCOUNTER — OFFICE VISIT (OUTPATIENT)
Dept: FAMILY MEDICINE | Facility: CLINIC | Age: 70
End: 2020-08-19
Payer: COMMERCIAL

## 2020-08-19 VITALS
WEIGHT: 143.3 LBS | HEART RATE: 73 BPM | TEMPERATURE: 97.5 F | OXYGEN SATURATION: 100 % | SYSTOLIC BLOOD PRESSURE: 104 MMHG | DIASTOLIC BLOOD PRESSURE: 58 MMHG | HEIGHT: 63 IN | BODY MASS INDEX: 25.39 KG/M2

## 2020-08-19 DIAGNOSIS — M79.10 MYALGIA: ICD-10-CM

## 2020-08-19 DIAGNOSIS — E03.9 ACQUIRED HYPOTHYROIDISM: ICD-10-CM

## 2020-08-19 DIAGNOSIS — R60.0 BILATERAL LOWER EXTREMITY EDEMA: Primary | ICD-10-CM

## 2020-08-19 PROCEDURE — 80048 BASIC METABOLIC PNL TOTAL CA: CPT | Performed by: PHYSICIAN ASSISTANT

## 2020-08-19 PROCEDURE — 84443 ASSAY THYROID STIM HORMONE: CPT | Performed by: PHYSICIAN ASSISTANT

## 2020-08-19 PROCEDURE — 82550 ASSAY OF CK (CPK): CPT | Performed by: PHYSICIAN ASSISTANT

## 2020-08-19 PROCEDURE — 99214 OFFICE O/P EST MOD 30 MIN: CPT | Performed by: PHYSICIAN ASSISTANT

## 2020-08-19 PROCEDURE — 36415 COLL VENOUS BLD VENIPUNCTURE: CPT | Performed by: PHYSICIAN ASSISTANT

## 2020-08-19 ASSESSMENT — MIFFLIN-ST. JEOR: SCORE: 1139.13

## 2020-08-19 NOTE — PROGRESS NOTES
HPI: Renea is a pleasant 69 yo female here with episode of leg swelling while she was on vacation in [place] on 8/15/20  She did have a glass of wine each day while on vacation in [place]  On 8/15/20 when her legs were swollen she also had an old fashioned, wine and dinner (steak).  She was sitting for about 3.5 hours and felt fine but when she went to bed she noticed swelling in both ankles and calves when she stood up  The skin felt tight to the knees   She elevated her legs on the sofa for about one hour and then the swelling resolved  The following day her legs looked almost back to normal.  On her drive home from from [place]  She had chest tightness that is normal for her; had left heart cath for her breathing issues in April  Denies hx of CHF or DVT  Has mild CAD and short runs of SVT  Denies assoc palpit assoc with her sxs    Past Medical History:   Diagnosis Date     Aortic root dilatation (H)      Asthma      BCC (basal cell carcinoma of skin) 2010    rt shoulder     Chronic hoarseness     congential vocal chord closure issue     Dysphagia     esophageal dilatation per [facility] 2/2019     Elevated coronary artery calcium score     CTa calcium score total 93.5 / 73rd percentile     Endometrial ca (H)     stage 1     Family hx of colon cancer      GERD (gastroesophageal reflux disease)      Hormone replacement therapy      Hyperlipidemia LDL goal <130 11/4/2016     Hypothyroidism      Nonrheumatic aortic valve insufficiency 11/4/2016     Nutcracker esophagus      Schatzki's ring of distal esophagus      Shoulder arthritis     rt     Thoracic aortic aneurysm without rupture (H)     4.3 cm     Vitamin D deficiency 11/4/2016     Past Surgical History:   Procedure Laterality Date     BIOPSY       C VAG HYST,UTERUS >250 GMS,REM TUBE/OVARY  2000     COLONOSCOPY       COLONOSCOPY N/A 12/12/2016    Procedure: COLONOSCOPY;  Surgeon: Manjinder Vera MD;  Location:  GI     CV CORONARY ANGIOGRAM N/A 4/27/2020    Procedure:  Coronary Angiogram;  Surgeon: Donaldo Rosen MD;  Location:  HEART CARDIAC CATH LAB     CV LEFT HEART CATH N/A 4/27/2020    Procedure: Left Heart Cath;  Surgeon: Donaldo Rosen MD;  Location:  HEART CARDIAC CATH LAB     CV LEFT VENTRICULOGRAM N/A 4/27/2020    Procedure: Left Ventriculogram;  Surgeon: Donaldo Rosen MD;  Location:  HEART CARDIAC CATH LAB     TONSILLECTOMY       Social History     Tobacco Use     Smoking status: Never Smoker     Smokeless tobacco: Never Used   Substance Use Topics     Alcohol use: Yes     Comment: 1 glass of wine per week     Current Outpatient Medications   Medication Sig Dispense Refill     albuterol (PROAIR HFA/PROVENTIL HFA/VENTOLIN HFA) 108 (90 Base) MCG/ACT inhaler Inhale 2 puffs into the lungs every 6 hours as needed for shortness of breath / dyspnea or wheezing 1 Inhaler 1     aspirin (ASA) 81 MG EC tablet Take 1 tablet (81 mg) by mouth daily       cholecalciferol (VITAMIN D3) 5000 units (125 mcg) capsule Take 5,000 Units by mouth once a week       estradiol (VIVELLE-DOT) 0.0375 MG/24HR BIW patch Place 1 patch onto the skin twice a week (Patient taking differently: Place 1 patch onto the skin once a week ) 24 patch 3     fluticasone (FLONASE) 50 MCG/ACT spray Spray 1 spray into both nostrils daily as needed        levothyroxine (SYNTHROID/LEVOTHROID) 75 MCG tablet Take 1 tablet (75 mcg) by mouth daily 90 tablet 3     multivitamin, therapeutic with minerals (MULTI-VITAMIN) TABS tablet Take 1 tablet by mouth daily 30 each 11     rosuvastatin (CRESTOR) 20 MG tablet Take 1 tablet (20 mg) by mouth daily 90 tablet 1     Allergies   Allergen Reactions     Dust Mites      Mold      No Known Drug Allergies      Seasonal Allergies      Adhesive Tape Rash     FAMILY HISTORY NOTED AND REVIEWED    REVIEW OF SYSTEMS: denies chest, sob, orthopnea, fever or chest pain    PHYSICAL EXAM:    /58 (BP Location: Right arm, Patient Position: Sitting, Cuff Size: Adult  "Regular)   Pulse 73   Temp 97.5  F (36.4  C) (Temporal)   Ht 1.6 m (5' 3\")   Wt 65 kg (143 lb 4.8 oz)   SpO2 100%   Breastfeeding No   BMI 25.38 kg/m      Patient appears non toxic  Lungs: CTA bilat  Heart: RRR without m/r/g.  Extr: no edema, erythema or palp cords.  She has some tenderness in post knees but no mass or evidence for bakers cyst    Results for orders placed or performed in visit on 08/19/20   Basic metabolic panel     Status: None   Result Value Ref Range    Sodium 138 133 - 144 mmol/L    Potassium 5.0 3.4 - 5.3 mmol/L    Chloride 108 94 - 109 mmol/L    Carbon Dioxide 24 20 - 32 mmol/L    Anion Gap 6 3 - 14 mmol/L    Glucose 86 70 - 99 mg/dL    Urea Nitrogen 15 7 - 30 mg/dL    Creatinine 0.89 0.52 - 1.04 mg/dL    GFR Estimate 66 >60 mL/min/[1.73_m2]    GFR Estimate If Black 76 >60 mL/min/[1.73_m2]    Calcium 9.0 8.5 - 10.1 mg/dL   CK total     Status: None   Result Value Ref Range    CK Total 95 30 - 225 U/L   TSH with free T4 reflex     Status: None   Result Value Ref Range    TSH 0.59 0.40 - 4.00 mU/L     Assessment and Plan:     (R60.0) Bilateral lower extremity edema  (primary encounter diagnosis)  Comment: due to gravity, sodium and inactivity  Plan: Basic metabolic panel        Resolved spont. Was bilat making DVT very unlikely. Monitor since resolved.    (E03.9) Acquired hypothyroidism  Comment:   Plan: TSH with free T4 reflex            (M79.10) Myalgia  Comment:   Plan: CK total              Christiane Salazar PA-C            "

## 2020-08-20 ENCOUNTER — TELEPHONE (OUTPATIENT)
Dept: CARDIOLOGY | Facility: CLINIC | Age: 70
End: 2020-08-20

## 2020-08-20 DIAGNOSIS — E78.2 MIXED HYPERLIPIDEMIA: ICD-10-CM

## 2020-08-20 LAB
ANION GAP SERPL CALCULATED.3IONS-SCNC: 6 MMOL/L (ref 3–14)
BUN SERPL-MCNC: 15 MG/DL (ref 7–30)
CALCIUM SERPL-MCNC: 9 MG/DL (ref 8.5–10.1)
CHLORIDE SERPL-SCNC: 108 MMOL/L (ref 94–109)
CK SERPL-CCNC: 95 U/L (ref 30–225)
CO2 SERPL-SCNC: 24 MMOL/L (ref 20–32)
CREAT SERPL-MCNC: 0.89 MG/DL (ref 0.52–1.04)
GFR SERPL CREATININE-BSD FRML MDRD: 66 ML/MIN/{1.73_M2}
GLUCOSE SERPL-MCNC: 86 MG/DL (ref 70–99)
POTASSIUM SERPL-SCNC: 5 MMOL/L (ref 3.4–5.3)
SODIUM SERPL-SCNC: 138 MMOL/L (ref 133–144)
TSH SERPL DL<=0.005 MIU/L-ACNC: 0.59 MU/L (ref 0.4–4)

## 2020-08-20 RX ORDER — ROSUVASTATIN CALCIUM 20 MG/1
10 TABLET, COATED ORAL DAILY
Qty: 90 TABLET | Refills: 1 | COMMUNITY
Start: 2020-08-20 | End: 2020-09-09

## 2020-08-20 ASSESSMENT — ASTHMA QUESTIONNAIRES: ACT_TOTALSCORE: 23

## 2020-08-20 NOTE — TELEPHONE ENCOUNTER
Call from patient, she is dealing with extremity pain as well as numbness and tingling in her arms. Her PCP is trying to sort out what is possible nerve pain from her neck and what might be statin intolerance.  Patient has had a CT of her neck and thinks she will also need an MRI to be sure if she has a pinched nerve.  Her left arm has been numb for a month with a tingling sensation down to her hand.    Underlying that, she has had soreness in her upper extremities from shoulder to elbows. Both of her leg are sore through the back of the leg down to the calf.  She had a recent episode of leg swelling where the skin was extremely tight and her leg color seemed grey. She used elevation to reduce this and just has a little swelling in the left leg. She did discuss this with her PCP and due to the bilateral symptoms a DVT check was not done.    Patient states her PCP suggested she contact Dr. Casey to see if she can reduce her crestor dose from 20mg to 10mg daily. She did not take her dose last night as she was in too much discomfort.    After starting crestor 20mg daily, patient's LDL decreased from 140 to 51    Will message Dr. Casey to review

## 2020-08-20 NOTE — TELEPHONE ENCOUNTER
Spoke with patient, she will hold her crestor for 2 weeks and call back with an update on her symptoms.

## 2020-08-20 NOTE — RESULT ENCOUNTER NOTE
Renea,    I am happy to report your thyroid test (TSH), muscle enzyme test (CK), electrolytes and kidney function tests were all normal.    Christiane Salazar PA-C

## 2020-09-04 ENCOUNTER — TELEPHONE (OUTPATIENT)
Dept: CARDIOLOGY | Facility: CLINIC | Age: 70
End: 2020-09-04

## 2020-09-04 DIAGNOSIS — E78.2 MIXED HYPERLIPIDEMIA: ICD-10-CM

## 2020-09-04 NOTE — TELEPHONE ENCOUNTER
"Attempted to contact patient to discuss results of 2 week statin hold. Patient c/o arm pain and was to hold crestor 20mg for 2 weeks and call back with update.    Per PCP notes on My Chart, patient was to seek physical therapy for her neck and arm pain.    Left message for patient to call back    9/8/2020 spoke with patient, she held crestor 20mg for 2 weeks and thinks the overall soreness in her arms is better. She would be willing to try a small dose of crestor to see if that would work. Her last LDL was 51.    Patient has also been doing to PT exercises for her neck and the tinging, numbness and \"tazer-feeling\" are much better.    Will message Dr. Casey to review  "

## 2020-09-09 RX ORDER — ROSUVASTATIN CALCIUM 10 MG/1
10 TABLET, COATED ORAL DAILY
Qty: 1 TABLET | Refills: 0 | COMMUNITY
Start: 2020-09-09 | End: 2020-10-26

## 2020-09-09 NOTE — TELEPHONE ENCOUNTER
Per Dr. Brambila's reply, Patient called.  Patient agrees to try Rosuvastatin 10 mg daily.  Patient will call with any symptoms or questions.

## 2020-10-26 DIAGNOSIS — E78.2 MIXED HYPERLIPIDEMIA: ICD-10-CM

## 2020-10-26 RX ORDER — ROSUVASTATIN CALCIUM 10 MG/1
10 TABLET, COATED ORAL DAILY
Qty: 90 TABLET | Refills: 0 | Status: SHIPPED | OUTPATIENT
Start: 2020-10-26 | End: 2020-11-16

## 2020-11-01 ENCOUNTER — TRANSFERRED RECORDS (OUTPATIENT)
Dept: HEALTH INFORMATION MANAGEMENT | Facility: CLINIC | Age: 70
End: 2020-11-01

## 2020-11-09 DIAGNOSIS — E03.9 ACQUIRED HYPOTHYROIDISM: ICD-10-CM

## 2020-11-10 DIAGNOSIS — I25.10 CORONARY ARTERY DISEASE INVOLVING NATIVE HEART WITHOUT ANGINA PECTORIS, UNSPECIFIED VESSEL OR LESION TYPE: ICD-10-CM

## 2020-11-10 DIAGNOSIS — E78.5 HYPERLIPIDEMIA LDL GOAL <100: ICD-10-CM

## 2020-11-10 DIAGNOSIS — R06.09 DYSPNEA ON EXERTION: ICD-10-CM

## 2020-11-10 LAB
ALT SERPL W P-5'-P-CCNC: 28 U/L (ref 0–50)
ANION GAP SERPL CALCULATED.3IONS-SCNC: 6 MMOL/L (ref 3–14)
BUN SERPL-MCNC: 17 MG/DL (ref 7–30)
CALCIUM SERPL-MCNC: 9.2 MG/DL (ref 8.5–10.1)
CHLORIDE SERPL-SCNC: 109 MMOL/L (ref 94–109)
CHOLEST SERPL-MCNC: 152 MG/DL
CO2 SERPL-SCNC: 24 MMOL/L (ref 20–32)
CREAT SERPL-MCNC: 0.74 MG/DL (ref 0.52–1.04)
GFR SERPL CREATININE-BSD FRML MDRD: 82 ML/MIN/{1.73_M2}
GLUCOSE SERPL-MCNC: 83 MG/DL (ref 70–99)
HDLC SERPL-MCNC: 77 MG/DL
LDLC SERPL CALC-MCNC: 60 MG/DL
NONHDLC SERPL-MCNC: 75 MG/DL
POTASSIUM SERPL-SCNC: 4.3 MMOL/L (ref 3.4–5.3)
SODIUM SERPL-SCNC: 139 MMOL/L (ref 133–144)
TRIGL SERPL-MCNC: 74 MG/DL

## 2020-11-10 PROCEDURE — 36415 COLL VENOUS BLD VENIPUNCTURE: CPT | Performed by: INTERNAL MEDICINE

## 2020-11-10 PROCEDURE — 80048 BASIC METABOLIC PNL TOTAL CA: CPT | Performed by: PHYSICIAN ASSISTANT

## 2020-11-10 PROCEDURE — 84460 ALANINE AMINO (ALT) (SGPT): CPT | Performed by: PHYSICIAN ASSISTANT

## 2020-11-10 PROCEDURE — 80061 LIPID PANEL: CPT | Performed by: PHYSICIAN ASSISTANT

## 2020-11-10 RX ORDER — LEVOTHYROXINE SODIUM 75 UG/1
75 TABLET ORAL DAILY
Qty: 90 TABLET | Refills: 2 | Status: SHIPPED | OUTPATIENT
Start: 2020-11-10 | End: 2020-11-16

## 2020-11-10 NOTE — TELEPHONE ENCOUNTER
Prescription approved per [facility] Refill Protocol.  Aleena ALVARADO RN

## 2020-11-16 ENCOUNTER — OFFICE VISIT (OUTPATIENT)
Dept: FAMILY MEDICINE | Facility: CLINIC | Age: 70
End: 2020-11-16
Payer: COMMERCIAL

## 2020-11-16 ENCOUNTER — OFFICE VISIT (OUTPATIENT)
Dept: CARDIOLOGY | Facility: CLINIC | Age: 70
End: 2020-11-16
Attending: PHYSICIAN ASSISTANT
Payer: COMMERCIAL

## 2020-11-16 ENCOUNTER — HEALTH MAINTENANCE LETTER (OUTPATIENT)
Age: 70
End: 2020-11-16

## 2020-11-16 VITALS
HEIGHT: 63 IN | WEIGHT: 141.8 LBS | DIASTOLIC BLOOD PRESSURE: 60 MMHG | OXYGEN SATURATION: 100 % | BODY MASS INDEX: 25.12 KG/M2 | HEART RATE: 72 BPM | TEMPERATURE: 96.4 F | SYSTOLIC BLOOD PRESSURE: 112 MMHG

## 2020-11-16 VITALS
WEIGHT: 141 LBS | HEIGHT: 63 IN | DIASTOLIC BLOOD PRESSURE: 71 MMHG | SYSTOLIC BLOOD PRESSURE: 138 MMHG | BODY MASS INDEX: 24.98 KG/M2 | HEART RATE: 73 BPM

## 2020-11-16 DIAGNOSIS — N60.19 FIBROCYSTIC BREAST DISEASE, UNSPECIFIED LATERALITY: ICD-10-CM

## 2020-11-16 DIAGNOSIS — R49.0 CHRONIC HOARSENESS: ICD-10-CM

## 2020-11-16 DIAGNOSIS — I77.810 AORTIC ROOT DILATATION (H): ICD-10-CM

## 2020-11-16 DIAGNOSIS — Z85.42 HISTORY OF ENDOMETRIAL CANCER: ICD-10-CM

## 2020-11-16 DIAGNOSIS — Z00.00 ENCOUNTER FOR ANNUAL WELLNESS VISIT (AWV) IN MEDICARE PATIENT: Primary | ICD-10-CM

## 2020-11-16 DIAGNOSIS — K22.4 NUTCRACKER ESOPHAGUS: ICD-10-CM

## 2020-11-16 DIAGNOSIS — R60.0 BILATERAL LOWER EXTREMITY EDEMA: ICD-10-CM

## 2020-11-16 DIAGNOSIS — K22.2 SCHATZKI'S RING OF DISTAL ESOPHAGUS: ICD-10-CM

## 2020-11-16 DIAGNOSIS — E78.2 MIXED HYPERLIPIDEMIA: ICD-10-CM

## 2020-11-16 DIAGNOSIS — E78.5 HYPERLIPIDEMIA LDL GOAL <100: ICD-10-CM

## 2020-11-16 DIAGNOSIS — E03.9 ACQUIRED HYPOTHYROIDISM: ICD-10-CM

## 2020-11-16 DIAGNOSIS — Z23 NEED FOR PROPHYLACTIC VACCINATION AND INOCULATION AGAINST INFLUENZA: ICD-10-CM

## 2020-11-16 DIAGNOSIS — I25.10 CORONARY ARTERY DISEASE INVOLVING NATIVE HEART WITHOUT ANGINA PECTORIS, UNSPECIFIED VESSEL OR LESION TYPE: ICD-10-CM

## 2020-11-16 DIAGNOSIS — J45.20 MILD INTERMITTENT ASTHMA WITHOUT COMPLICATION: ICD-10-CM

## 2020-11-16 PROBLEM — I24.9 ACS (ACUTE CORONARY SYNDROME) (H): Status: RESOLVED | Noted: 2020-03-24 | Resolved: 2020-11-16

## 2020-11-16 PROBLEM — R06.09 DYSPNEA ON EXERTION: Status: RESOLVED | Noted: 2018-01-17 | Resolved: 2020-11-16

## 2020-11-16 PROBLEM — Z98.890 STATUS POST CORONARY ANGIOGRAM: Status: RESOLVED | Noted: 2020-04-27 | Resolved: 2020-11-16

## 2020-11-16 PROCEDURE — 99397 PER PM REEVAL EST PAT 65+ YR: CPT | Mod: 25 | Performed by: INTERNAL MEDICINE

## 2020-11-16 PROCEDURE — 90662 IIV NO PRSV INCREASED AG IM: CPT | Performed by: INTERNAL MEDICINE

## 2020-11-16 PROCEDURE — 99214 OFFICE O/P EST MOD 30 MIN: CPT | Performed by: INTERNAL MEDICINE

## 2020-11-16 PROCEDURE — 99213 OFFICE O/P EST LOW 20 MIN: CPT | Mod: 25 | Performed by: INTERNAL MEDICINE

## 2020-11-16 PROCEDURE — 90471 IMMUNIZATION ADMIN: CPT | Performed by: INTERNAL MEDICINE

## 2020-11-16 RX ORDER — ROSUVASTATIN CALCIUM 10 MG/1
10 TABLET, COATED ORAL DAILY
Qty: 90 TABLET | Refills: 3 | Status: SHIPPED | OUTPATIENT
Start: 2020-11-16 | End: 2021-08-03

## 2020-11-16 RX ORDER — LEVOTHYROXINE SODIUM 75 UG/1
75 TABLET ORAL DAILY
Qty: 90 TABLET | Refills: 3 | Status: SHIPPED | OUTPATIENT
Start: 2020-11-16 | End: 2021-08-03

## 2020-11-16 ASSESSMENT — MIFFLIN-ST. JEOR
SCORE: 1128.7
SCORE: 1132.33

## 2020-11-16 ASSESSMENT — ACTIVITIES OF DAILY LIVING (ADL): CURRENT_FUNCTION: NO ASSISTANCE NEEDED

## 2020-11-16 NOTE — LETTER
[place] [address] [facility] [address]  Phone: [phone]    11/16/20    Renea Lerma  [address] [facility] [address]      To whom it may concern:     Patient is at higher risk of COVID 19 complications due to age and coronary artery disease.    Sincerely,      Caprice Lares MD

## 2020-11-16 NOTE — LETTER
11/16/2020    Caprice Lares MD  [address]  [facility] [address]    RE: Renea Lerma       Dear Colleague,    I had the pleasure of seeing Renea Lerma in the [facility].    HPI and Plan:     I had the pleasure of seeing Mr. Lerma in followup at the [facility] today.  She is a very pleasant 70-year-old female who I last saw in 04/2020. She was seeing Dr. Bustamante prior to that for followup of a dilated ascending thoracic aorta as well as occasional dyspnea and chest discomfort on exertion. From a risk factor standpoint, the patient does have a history of dyslipidemia but was reluctant to take statins at the time.     Due to persistent chest discomfort on exertion, I ordered a CT coronary angiogram after her office visit in February 2020. This demonstrated calcified plaque involving the mid left anterior descending artery with associated moderate stenosis.  The other coronary arteries appeared angiographically normal.  Given these findings, I recommended initiation of statin therapy and also recommended coronary angiography for persistent chest discomfort.    Coronary angiography was ultimately performed on 4/27/2020.  She was found to have mild to moderate nonobstructive coronary disease involving the proximal LAD and obtuse marginal.  No intervention was performed.  Short bursts of paroxysmal atrial tachycardia were noted during angiography, and a subsequent Zio patch monitor demonstrated short runs of supraventricular tachycardia but no evidence of atrial fibrillation.    She has done well since then, although we did need to reduce her rosuvastatin dose to 10 MG  Daily due to myalgias.  These have now resolved.  With the ongoing coronavirus pandemic she has not been exercising as much as she was last year, but she still remains quite active without any functional limitations.     She specifically denies any exertional chest  discomfort syncope or presyncope.  She has occasional palpitations at rest but these are brief and self-limiting.    Physical exam is dictated below.    Lipids on 11/10/2020 demonstrated total cholesterol 152, HDL 77, LDL of 60 and triglycerides of 74.    IMPRESSION:   1.  Mild dilatation of the ascending thoracic aorta which has been stable.  There is no family history of aortic dissection.   2.  Dyslipidemia, currently on rosuvastatin.  3.  Mild to moderate nonobstructive coronary artery disease as described above.    The patient is doing well overall from a cardiovascular standpoint.  I went over the angiographic findings with her in detail.  She is already on an excellent dietary regimen and her lipids are at goal on atorvastatin.  I would recommend follow-up in approximately 1 year at which point an exercise stress echocardiogram will be performed.  We can also consider a repeat MRA of the aorta in 1 to 2 years.    It was a pleasure seeing her in follow-up today.       CURRENT MEDICATIONS:  Current Outpatient Medications   Medication Sig Dispense Refill     albuterol (PROAIR HFA/PROVENTIL HFA/VENTOLIN HFA) 108 (90 Base) MCG/ACT inhaler Inhale 2 puffs into the lungs every 6 hours as needed for shortness of breath / dyspnea or wheezing 1 Inhaler 1     aspirin (ASA) 81 MG EC tablet Take 1 tablet (81 mg) by mouth daily       cholecalciferol (VITAMIN D3) 5000 units (125 mcg) capsule Take 5,000 Units by mouth once a week       estradiol (VIVELLE-DOT) 0.0375 MG/24HR BIW patch Place 1 patch onto the skin twice a week (Patient taking differently: Place 1 patch onto the skin once a week ) 24 patch 3     fluticasone (FLONASE) 50 MCG/ACT spray Spray 1 spray into both nostrils daily as needed        levothyroxine (SYNTHROID/LEVOTHROID) 75 MCG tablet Take 1 tablet (75 mcg) by mouth daily 90 tablet 2     multivitamin, therapeutic with minerals (MULTI-VITAMIN) TABS tablet Take 1 tablet by mouth daily 30 each 11      rosuvastatin (CRESTOR) 10 MG tablet Take 1 tablet (10 mg) by mouth daily 90 tablet 0       ALLERGIES     Allergies   Allergen Reactions     Dust Mites      Mold      No Known Drug Allergies      Seasonal Allergies      Adhesive Tape Rash       PAST MEDICAL HISTORY:  Past Medical History:   Diagnosis Date     Aortic root dilatation (H)      Asthma      BCC (basal cell carcinoma of skin) 2010    rt shoulder     Chronic hoarseness     congential vocal chord closure issue     Dysphagia     esophageal dilatation per [facility] 2/2019     Elevated coronary artery calcium score     CTa calcium score total 93.5 / 73rd percentile     Endometrial ca (H)     stage 1     Family hx of colon cancer      GERD (gastroesophageal reflux disease)      Hormone replacement therapy      Hyperlipidemia LDL goal <130 11/4/2016     Hypothyroidism      Nonrheumatic aortic valve insufficiency 11/4/2016     Nutcracker esophagus      Schatzki's ring of distal esophagus      Shoulder arthritis     rt     Thoracic aortic aneurysm without rupture (H)     4.3 cm     Vitamin D deficiency 11/4/2016       PAST SURGICAL HISTORY:  Past Surgical History:   Procedure Laterality Date     BIOPSY       C VAG HYST,UTERUS >250 GMS,REM TUBE/OVARY  2000     COLONOSCOPY       COLONOSCOPY N/A 12/12/2016    Procedure: COLONOSCOPY;  Surgeon: Manjinder Vera MD;  Location:  GI     CV CORONARY ANGIOGRAM N/A 4/27/2020    Procedure: Coronary Angiogram;  Surgeon: Donaldo Rosen MD;  Location:  HEART CARDIAC CATH LAB     CV LEFT HEART CATH N/A 4/27/2020    Procedure: Left Heart Cath;  Surgeon: Donaldo Roesn MD;  Location:  HEART CARDIAC CATH LAB     CV LEFT VENTRICULOGRAM N/A 4/27/2020    Procedure: Left Ventriculogram;  Surgeon: Donaldo Rosen MD;  Location:  HEART CARDIAC CATH LAB     TONSILLECTOMY         FAMILY HISTORY:  Family History   Problem Relation Age of Onset     Diabetes Father        SOCIAL HISTORY:  Social History     Socioeconomic  History     Marital status: Single     Spouse name: Not on file     Number of children: Not on file     Years of education: Not on file     Highest education level: Not on file   Occupational History     Not on file   Social Needs     Financial resource strain: Not on file     Food insecurity     Worry: Not on file     Inability: Not on file     Transportation needs     Medical: Not on file     Non-medical: Not on file   Tobacco Use     Smoking status: Never Smoker     Smokeless tobacco: Never Used   Substance and Sexual Activity     Alcohol use: Yes     Comment: 1 glass of wine per week     Drug use: No     Sexual activity: Not Currently     Partners: Male     Birth control/protection: Post-menopausal   Lifestyle     Physical activity     Days per week: Not on file     Minutes per session: Not on file     Stress: Not on file   Relationships     Social connections     Talks on phone: Not on file     Gets together: Not on file     Attends [employer] service: Not on file     Active member of club or organization: Not on file     Attends meetings of clubs or organizations: Not on file     Relationship status: Not on file     Intimate partner violence     Fear of current or ex partner: Not on file     Emotionally abused: Not on file     Physically abused: Not on file     Forced sexual activity: Not on file   Other Topics Concern     Parent/sibling w/ CABG, MI or angioplasty before 65F 55M? No   Social History Narrative    Single, 2 children       Review of Systems:  Skin:          Eyes:         ENT:         Respiratory:          Cardiovascular:         Gastroenterology:        Genitourinary:         Musculoskeletal:         Neurologic:         Psychiatric:         Heme/Lymph/Imm:         Endocrine:           Physical Exam:  Vitals: There were no vitals taken for this visit.    Constitutional:  cooperative, alert and oriented, well developed, well nourished, in no acute distress        Skin:  warm and dry to the touch, no  apparent skin lesions or masses noted          Head:  normocephalic, no masses or lesions        Eyes:  pupils equal and round, conjunctivae and lids unremarkable, sclera white, no xanthalasma, EOMS intact, no nystagmus        Lymph:      ENT:  no pallor or cyanosis, dentition good        Neck:  carotid pulses are full and equal bilaterally        Respiratory:  clear to auscultation         Cardiac: regular rhythm                pulses full and equal, no bruits auscultated                                        GI:  abdomen soft, non-tender, BS normoactive, no mass, no HSM, no bruits        Extremities and Muscular Skeletal:  no deformities, clubbing, cyanosis, erythema observed              Neurological:  no gross motor deficits        Psych:  Alert and Oriented x 3          Thank you for allowing me to participate in the care of your patient.    Sincerely,     Kathia Casey MD     [facility]

## 2020-11-16 NOTE — PATIENT INSTRUCTIONS
The shingrix is our newer vaccine and is recommended to healthy adults over 55. It's administered in 2 separate doses. You can just walk into the pharmacy and get it when convenient for you. However, it's on a national shortage currently so we recommend calling the pharmacy a head of time to verify they have it in stock ([facility] and many other pharmacies carry this). Generally speaking the pharmacy is covered better by insurance than the clinic.  Please check with your insurance to make sure what is covered/what your copay would be.     Here is a link from the CDC on Shingles Vaccines, where they provide information on this new Shingrix vaccine.   https://www.cdc.gov/shingles/vaccination.html

## 2020-11-16 NOTE — PROGRESS NOTES
HPI and Plan:     I had the pleasure of seeing Mr. Lerma in followup at the [facility] today.  She is a very pleasant 70-year-old female who I last saw in 04/2020. She was seeing Dr. Bustamante prior to that for followup of a dilated ascending thoracic aorta as well as occasional dyspnea and chest discomfort on exertion. From a risk factor standpoint, the patient does have a history of dyslipidemia but was reluctant to take statins at the time.     Due to persistent chest discomfort on exertion, I ordered a CT coronary angiogram after her office visit in February 2020. This demonstrated calcified plaque involving the mid left anterior descending artery with associated moderate stenosis.  The other coronary arteries appeared angiographically normal.  Given these findings, I recommended initiation of statin therapy and also recommended coronary angiography for persistent chest discomfort.    Coronary angiography was ultimately performed on 4/27/2020.  She was found to have mild to moderate nonobstructive coronary disease involving the proximal LAD and obtuse marginal.  No intervention was performed.  Short bursts of paroxysmal atrial tachycardia were noted during angiography, and a subsequent Zio patch monitor demonstrated short runs of supraventricular tachycardia but no evidence of atrial fibrillation.    She has done well since then, although we did need to reduce her rosuvastatin dose to 10 MG  Daily due to myalgias.  These have now resolved.  With the ongoing coronavirus pandemic she has not been exercising as much as she was last year, but she still remains quite active without any functional limitations.     She specifically denies any exertional chest discomfort syncope or presyncope.  She has occasional palpitations at rest but these are brief and self-limiting.    Physical exam is dictated below.    Lipids on 11/10/2020 demonstrated total cholesterol 152, HDL 77, LDL of  60 and triglycerides of 74.    IMPRESSION:   1.  Mild dilatation of the ascending thoracic aorta which has been stable.  There is no family history of aortic dissection.   2.  Dyslipidemia, currently on rosuvastatin.  3.  Mild to moderate nonobstructive coronary artery disease as described above.    The patient is doing well overall from a cardiovascular standpoint.  I went over the angiographic findings with her in detail.  She is already on an excellent dietary regimen and her lipids are at goal on atorvastatin.  I would recommend follow-up in approximately 1 year at which point an exercise stress echocardiogram will be performed.  We can also consider a repeat MRA of the aorta in 1 to 2 years.    It was a pleasure seeing her in follow-up today.       CURRENT MEDICATIONS:  Current Outpatient Medications   Medication Sig Dispense Refill     albuterol (PROAIR HFA/PROVENTIL HFA/VENTOLIN HFA) 108 (90 Base) MCG/ACT inhaler Inhale 2 puffs into the lungs every 6 hours as needed for shortness of breath / dyspnea or wheezing 1 Inhaler 1     aspirin (ASA) 81 MG EC tablet Take 1 tablet (81 mg) by mouth daily       cholecalciferol (VITAMIN D3) 5000 units (125 mcg) capsule Take 5,000 Units by mouth once a week       estradiol (VIVELLE-DOT) 0.0375 MG/24HR BIW patch Place 1 patch onto the skin twice a week (Patient taking differently: Place 1 patch onto the skin once a week ) 24 patch 3     fluticasone (FLONASE) 50 MCG/ACT spray Spray 1 spray into both nostrils daily as needed        levothyroxine (SYNTHROID/LEVOTHROID) 75 MCG tablet Take 1 tablet (75 mcg) by mouth daily 90 tablet 2     multivitamin, therapeutic with minerals (MULTI-VITAMIN) TABS tablet Take 1 tablet by mouth daily 30 each 11     rosuvastatin (CRESTOR) 10 MG tablet Take 1 tablet (10 mg) by mouth daily 90 tablet 0       ALLERGIES     Allergies   Allergen Reactions     Dust Mites      Mold      No Known Drug Allergies      Seasonal Allergies      Adhesive Tape  Rash       PAST MEDICAL HISTORY:  Past Medical History:   Diagnosis Date     Aortic root dilatation (H)      Asthma      BCC (basal cell carcinoma of skin) 2010    rt shoulder     Chronic hoarseness     congential vocal chord closure issue     Dysphagia     esophageal dilatation per [facility] 2/2019     Elevated coronary artery calcium score     CTa calcium score total 93.5 / 73rd percentile     Endometrial ca (H)     stage 1     Family hx of colon cancer      GERD (gastroesophageal reflux disease)      Hormone replacement therapy      Hyperlipidemia LDL goal <130 11/4/2016     Hypothyroidism      Nonrheumatic aortic valve insufficiency 11/4/2016     Nutcracker esophagus      Schatzki's ring of distal esophagus      Shoulder arthritis     rt     Thoracic aortic aneurysm without rupture (H)     4.3 cm     Vitamin D deficiency 11/4/2016       PAST SURGICAL HISTORY:  Past Surgical History:   Procedure Laterality Date     BIOPSY       C VAG HYST,UTERUS >250 GMS,REM TUBE/OVARY  2000     COLONOSCOPY       COLONOSCOPY N/A 12/12/2016    Procedure: COLONOSCOPY;  Surgeon: Manjinder Vera MD;  Location:  GI     CV CORONARY ANGIOGRAM N/A 4/27/2020    Procedure: Coronary Angiogram;  Surgeon: Donaldo Rosen MD;  Location:  HEART CARDIAC CATH LAB     CV LEFT HEART CATH N/A 4/27/2020    Procedure: Left Heart Cath;  Surgeon: Donaldo Rosen MD;  Location:  HEART CARDIAC CATH LAB     CV LEFT VENTRICULOGRAM N/A 4/27/2020    Procedure: Left Ventriculogram;  Surgeon: Donaldo Rosen MD;  Location:  HEART CARDIAC CATH LAB     TONSILLECTOMY         FAMILY HISTORY:  Family History   Problem Relation Age of Onset     Diabetes Father        SOCIAL HISTORY:  Social History     Socioeconomic History     Marital status: Single     Spouse name: Not on file     Number of children: Not on file     Years of education: Not on file     Highest education level: Not on file   Occupational History     Not on file   Social Needs      Financial resource strain: Not on file     Food insecurity     Worry: Not on file     Inability: Not on file     Transportation needs     Medical: Not on file     Non-medical: Not on file   Tobacco Use     Smoking status: Never Smoker     Smokeless tobacco: Never Used   Substance and Sexual Activity     Alcohol use: Yes     Comment: 1 glass of wine per week     Drug use: No     Sexual activity: Not Currently     Partners: Male     Birth control/protection: Post-menopausal   Lifestyle     Physical activity     Days per week: Not on file     Minutes per session: Not on file     Stress: Not on file   Relationships     Social connections     Talks on phone: Not on file     Gets together: Not on file     Attends [employer] service: Not on file     Active member of club or organization: Not on file     Attends meetings of clubs or organizations: Not on file     Relationship status: Not on file     Intimate partner violence     Fear of current or ex partner: Not on file     Emotionally abused: Not on file     Physically abused: Not on file     Forced sexual activity: Not on file   Other Topics Concern     Parent/sibling w/ CABG, MI or angioplasty before 65F 55M? No   Social History Narrative    Single, 2 children       Review of Systems:  Skin:          Eyes:         ENT:         Respiratory:          Cardiovascular:         Gastroenterology:        Genitourinary:         Musculoskeletal:         Neurologic:         Psychiatric:         Heme/Lymph/Imm:         Endocrine:           Physical Exam:  Vitals: There were no vitals taken for this visit.    Constitutional:  cooperative, alert and oriented, well developed, well nourished, in no acute distress        Skin:  warm and dry to the touch, no apparent skin lesions or masses noted          Head:  normocephalic, no masses or lesions        Eyes:  pupils equal and round, conjunctivae and lids unremarkable, sclera white, no xanthalasma, EOMS intact, no nystagmus        Lymph:       ENT:  no pallor or cyanosis, dentition good        Neck:  carotid pulses are full and equal bilaterally        Respiratory:  clear to auscultation         Cardiac: regular rhythm                pulses full and equal, no bruits auscultated                                        GI:  abdomen soft, non-tender, BS normoactive, no mass, no HSM, no bruits        Extremities and Muscular Skeletal:  no deformities, clubbing, cyanosis, erythema observed              Neurological:  no gross motor deficits        Psych:  Alert and Oriented x 3        CC  Charisse Bustos, PA-C  [id number]  [place] [address]

## 2020-11-16 NOTE — PROGRESS NOTES
"Coronary angiogram SUBJECTIVE:   Renea Lerma is a 70 year old female who presents for Preventive Visit.  This is a very pleasant 70 years old woman who is going to [place] for a week next month  She is otherwise taking all the precautions to prevent COVID-19  18 years old granddaughter did acquire  , but patient was not exposed    Renea is stable in regards to chest pain or shortness of breathe it But during uphill walk she does notice symptoms of dyspnea  She is seeing cardiology also today and had labs taken in February she was admitted and  Did have an angiography which showed a left-sided LAD plaque but otherwise coronaries were quite clean  Her esophageal symptoms have remained stable as well  This is a very pleasant 70 years old woman who is current on healthcare screening aspects    Patient has been advised of split billing requirements and indicates understanding: Yes   Are you in the first 12 months of your Medicare coverage?  No    Healthy Habits:    In general, how would you rate your overall health?  Good    Frequency of exercise:  2-3 days/week    Duration of exercise:  30-45 minutes    Do you usually eat at least 4 servings of fruit and vegetables a day, include whole grains    & fiber and avoid regularly eating high fat or \"junk\" foods?  Yes    Taking medications regularly:  Yes    Barriers to taking medications:  None    Medication side effects:  None    Ability to successfully perform activities of daily living:  No assistance needed    Home Safety:  No safety concerns identified    Hearing Impairment:  No hearing concerns    In the past 6 months, have you been bothered by leaking of urine?  No    In general, how would you rate your overall mental or emotional health?  Good      PHQ-2 Total Score:    Additional concerns today:  No    Do you feel safe in your environment? Yes    Have you ever done Advance Care Planning? (For example, a Health Directive, POLST, or a discussion with a medical " provider or your loved ones about your wishes): Yes, patient states has an Advance Care Planning document and will bring a copy to the clinic.      Fall risk  Fallen 2 or more times in the past year?: No  Any fall with injury in the past year?: No    Cognitive Screening   1) Repeat 3 items (Leader, Season, Table)    2) Clock draw: NORMAL  3) 3 item recall: Recalls 3 objects  Results: 3 items recalled: COGNITIVE IMPAIRMENT LESS LIKELY    Mini-CogTM Copyright STEPHANIE Hamlin. Licensed by the author for use in [facility]; reprinted with permission (malik@[facility]). All rights reserved.      Do you have sleep apnea, excessive snoring or daytime drowsiness?: no    Reviewed and updated as needed this visit by clinical staff  Tobacco  Allergies  Meds  Problems  Med Hx  Surg Hx  Fam Hx          Reviewed and updated as needed this visit by Provider  Tobacco  Allergies  Meds  Problems  Med Hx  Surg Hx  Fam Hx         Social History     Tobacco Use     Smoking status: Never Smoker     Smokeless tobacco: Never Used   Substance Use Topics     Alcohol use: Yes     Comment: 1 glass of wine per week     If you drink alcohol do you typically have >3 drinks per day or >7 drinks per week? No    Alcohol Use 11/16/2020   Prescreen: >3 drinks/day or >7 drinks/week? -   Prescreen: >3 drinks/day or >7 drinks/week? No               Current providers sharing in care for this patient include:   Patient Care Team:  Caprice Lares MD as PCP - General (Internal Medicine)  Caprice Lares MD as Assigned PCP  Chiquis Panda MD as MD (Dermatology)  Zayda Hussein MD as MD (Dermatology)  Raj Shirley MD as MD (Gastroenterology)  CEDRIC Seals MD as MD (Dermatology)  CEDRIC Seals MD as Assigned Pediatric Specialist Provider  Kathia Casey MD as Assigned Heart and Vascular Provider    The following health maintenance items are reviewed in Epic and correct as of today:  Health Maintenance    Topic Date Due     ASTHMA ACTION PLAN  1950     Pneumococcal Vaccine: 65+ Years (2 of 2 - PPSV23) 10/01/2016     ZOSTER IMMUNIZATION (2 of 3) 12/30/2016     DTAP/TDAP/TD IMMUNIZATION (2 - Td) 03/22/2020     FALL RISK ASSESSMENT  11/11/2020     ASTHMA CONTROL TEST  11/19/2020     TSH W/FREE T4 REFLEX  08/19/2021     MAMMO SCREENING  11/04/2021     BMP  11/10/2021     LIPID  11/10/2021     MEDICARE ANNUAL WELLNESS VISIT  11/16/2021     ADVANCE CARE PLANNING  11/16/2025     COLORECTAL CANCER SCREENING  12/12/2026     DEXA  Completed     HEPATITIS C SCREENING  Completed     PHQ-2  Completed     INFLUENZA VACCINE  Completed     Pneumococcal Vaccine: Pediatrics (0 to 5 Years) and At-Risk Patients (6 to 64 Years)  Aged Out     IPV IMMUNIZATION  Aged Out     MENINGITIS IMMUNIZATION  Aged Out     HEPATITIS B IMMUNIZATION  Aged Out     Patient Active Problem List   Diagnosis     Esophageal reflux     Mild intermittent asthma     Hypothyroidism     Fibrocystic breast disease, unspecified laterality     Nonrheumatic aortic valve insufficiency     Thoracic aortic aneurysm without rupture (H)     Mixed hyperlipidemia     Shoulder arthritis     Vitamin D deficiency     Lung nodule     Nutcracker esophagus     Schatzki's ring of distal esophagus     Aortic root dilatation (H)     Chronic hoarseness     Dysphagia     Coronary artery disease involving native heart without angina pectoris, unspecified vessel or lesion type     Elevated coronary artery calcium score     Chest pain     History of endometrial cancer     Past Surgical History:   Procedure Laterality Date     BIOPSY       C VAG HYST,UTERUS >250 GMS,REM TUBE/OVARY  2000     COLONOSCOPY       COLONOSCOPY N/A 12/12/2016    Procedure: COLONOSCOPY;  Surgeon: Manjinder Vera MD;  Location:  GI     CV CORONARY ANGIOGRAM N/A 4/27/2020    Procedure: Coronary Angiogram;  Surgeon: Donaldo Rosen MD;  Location:  HEART CARDIAC CATH LAB     CV LEFT HEART CATH N/A  "4/27/2020    Procedure: Left Heart Cath;  Surgeon: Donaldo Rosen MD;  Location:  HEART CARDIAC CATH LAB     CV LEFT VENTRICULOGRAM N/A 4/27/2020    Procedure: Left Ventriculogram;  Surgeon: Donaldo Rosen MD;  Location:  HEART CARDIAC CATH LAB     TONSILLECTOMY         Social History     Tobacco Use     Smoking status: Never Smoker     Smokeless tobacco: Never Used   Substance Use Topics     Alcohol use: Yes     Comment: 1 glass of wine per week     Family History   Problem Relation Age of Onset     Diabetes Father          Current Outpatient Medications   Medication Sig Dispense Refill     levothyroxine (SYNTHROID/LEVOTHROID) 75 MCG tablet Take 1 tablet (75 mcg) by mouth daily 90 tablet 3     rosuvastatin (CRESTOR) 10 MG tablet Take 1 tablet (10 mg) by mouth daily 90 tablet 3     albuterol (PROAIR HFA/PROVENTIL HFA/VENTOLIN HFA) 108 (90 Base) MCG/ACT inhaler Inhale 2 puffs into the lungs every 6 hours as needed for shortness of breath / dyspnea or wheezing 1 Inhaler 1     aspirin (ASA) 81 MG EC tablet Take 1 tablet (81 mg) by mouth daily       cholecalciferol (VITAMIN D3) 5000 units (125 mcg) capsule Take 5,000 Units by mouth once a week       fluticasone (FLONASE) 50 MCG/ACT spray Spray 1 spray into both nostrils daily as needed        multivitamin, therapeutic with minerals (MULTI-VITAMIN) TABS tablet Take 1 tablet by mouth daily 30 each 11         Review of Systems  10 point ROS of systems including Constitutional, Eyes, Respiratory, Cardiovascular, Gastroenterology, Genitourinary, Integumentary, Muscularskeletal, Psychiatric were all negative except for pertinent positives noted in my HPI.      OBJECTIVE:   /60 (BP Location: Left arm, Patient Position: Sitting, Cuff Size: Adult Regular)   Pulse 72   Temp 96.4  F (35.8  C) (Tympanic)   Ht 1.6 m (5' 3\")   Wt 64.3 kg (141 lb 12.8 oz)   SpO2 100%   BMI 25.12 kg/m   Estimated body mass index is 25.12 kg/m  as calculated from the " "following:    Height as of this encounter: 1.6 m (5' 3\").    Weight as of this encounter: 64.3 kg (141 lb 12.8 oz).  Physical Exam  GENERAL APPEARANCE: healthy, alert and no distress  EYES: Eyes grossly normal to inspection, PERRL and conjunctivae and sclerae normal  HENT: ear canals and TM's normal, nose and mouth without ulcers or lesions, oropharynx clear and oral mucous membranes moist  NECK: no adenopathy, no asymmetry, masses, or scars and thyroid normal to palpation  RESP: lungs clear to auscultation - no rales, rhonchi or wheezes  BREAST: normal without masses, tenderness or nipple discharge and no palpable axillary masses or adenopathy  CV: regular rate and rhythm, normal S1 S2, no S3 or S4, no murmur, click or rub, no peripheral edema and peripheral pulses strong  ABDOMEN: soft, nontender, no hepatosplenomegaly, no masses and bowel sounds normal  MS: no musculoskeletal defects are noted and gait is age appropriate without ataxia  SKIN: no suspicious lesions or rashes  NEURO: Normal strength and tone, sensory exam grossly normal, mentation intact and speech normal  PSYCH: mentation appears normal and affect normal/bright    Orders Only on 11/10/2020   Component Date Value Ref Range Status     ALT 11/10/2020 28  0 - 50 U/L Final     Cholesterol 11/10/2020 152  <200 mg/dL Final     Triglycerides 11/10/2020 74  <150 mg/dL Final     HDL Cholesterol 11/10/2020 77  >49 mg/dL Final     LDL Cholesterol Calculated 11/10/2020 60  <100 mg/dL Final    Desirable:       <100 mg/dl     Non HDL Cholesterol 11/10/2020 75  <130 mg/dL Final     Sodium 11/10/2020 139  133 - 144 mmol/L Final     Potassium 11/10/2020 4.3  3.4 - 5.3 mmol/L Final     Chloride 11/10/2020 109  94 - 109 mmol/L Final     Carbon Dioxide 11/10/2020 24  20 - 32 mmol/L Final     Anion Gap 11/10/2020 6  3 - 14 mmol/L Final     Glucose 11/10/2020 83  70 - 99 mg/dL Final     Urea Nitrogen 11/10/2020 17  7 - 30 mg/dL Final     Creatinine 11/10/2020 0.74  0.52 " - 1.04 mg/dL Final     GFR Estimate 11/10/2020 82  >60 mL/min/[1.73_m2] Final    Comment: Non  GFR Calc  Starting 12/18/2018, serum creatinine based estimated GFR (eGFR) will be   calculated using the Chronic Kidney Disease Epidemiology Collaboration   (CKD-EPI) equation.       GFR Estimate If Black 11/10/2020 >90  >60 mL/min/[1.73_m2] Final    Comment:  GFR Calc  Starting 12/18/2018, serum creatinine based estimated GFR (eGFR) will be   calculated using the Chronic Kidney Disease Epidemiology Collaboration   (CKD-EPI) equation.       Calcium 11/10/2020 9.2  8.5 - 10.1 mg/dL Final     TSH   Date Value Ref Range Status   08/19/2020 0.59 0.40 - 4.00 mU/L Final         ASSESSMENT / PLAN:   Renea was seen today for physical and imm/inj.    Diagnoses and all orders for this visit:    Encounter for annual wellness visit (AWV) in Medicare patient    Coronary artery disease involving native heart without angina pectoris, unspecified vessel or lesion type    Aortic root dilatation (H)    Chronic hoarseness    Nutcracker esophagus    Schatzki's ring of distal esophagus    Bilateral lower extremity edema    Hyperlipidemia LDL goal <100    History of endometrial cancer    Acquired hypothyroidism  -     levothyroxine (SYNTHROID/LEVOTHROID) 75 MCG tablet; Take 1 tablet (75 mcg) by mouth daily    Need for prophylactic vaccination and inoculation against influenza  -     FLUZONE HIGH DOSE 65+  [24732]  -     ADMIN INFLUENZA (For MEDICARE Patients ONLY) []    Fibrocystic breast disease, unspecified laterality    Mixed hyperlipidemia  -     rosuvastatin (CRESTOR) 10 MG tablet; Take 1 tablet (10 mg) by mouth daily    Mild intermittent asthma without complication      This is a very pleasant 70 years old woman who is current on all her healthcare screening aspects  She has history of endometrial carcinoma and now has mild coronary disease and therefore I would recommend going off hormone replacement  "therapy.  She will taper off to weekly and then twice monthly and then stop  She has followed up with her gynecologist for her endometrial carcinoma and remains in remission.  About her travel, I did write a letter to airlines about her risk factors.  I discussed with her about COVID-19 travel restrictions which she is well aware of as well.    Her asthma is stable and her coronary artery disease is mild.  Her aortic root is slightly dilated at 4.3 cm this February on CT angiography and she will see cardiology for that she will continue Crestor and aspirin  Due to persistent chest discomfort on exertion,CT coronary angiogram was done February 2020. This demonstrated calcified plaque involving the mid left anterior descending artery with associated moderate stenosis.  On angiography, this was confirmed with obtuse marginal the other coronary arteries appeared angiographically normal.  A and angiography was done following that which confirmed the LAD lesion l coronary angiogram was donpril a coronary angiogram was done so confirmed this and obtuse marginal moderate disease.  Keron has no symptoms of palpitations anymore,  And she is current on mammography and colonoscopy she will take Shingrix in the pharmacy.  Her esophageal symptoms are quite stable without dysphagia and her voice is also stable in regards to dysphonia.  It is a delight to see her    Patient has been advised of split billing requirements and indicates understanding: Yes  COUNSELING:  Reviewed preventive health counseling, as reflected in patient instructions       Regular exercise       Healthy diet/nutrition       Vision screening    Estimated body mass index is 25.12 kg/m  as calculated from the following:    Height as of this encounter: 1.6 m (5' 3\").    Weight as of this encounter: 64.3 kg (141 lb 12.8 oz).        She reports that she has never smoked. She has never used smokeless tobacco.      Appropriate preventive services were discussed " with this patient, including applicable screening as appropriate for cardiovascular disease, diabetes, osteopenia/osteoporosis, and glaucoma.  As appropriate for age/gender, discussed screening for colorectal cancer, prostate cancer, breast cancer, and cervical cancer. Checklist reviewing preventive services available has been given to the patient.    Reviewed patients plan of care and provided an AVS. The Basic Care Plan (routine screening as documented in Health Maintenance) for Renea meets the Care Plan requirement. This Care Plan has been established and reviewed with the Patient.    Counseling Resources:  ATP IV Guidelines  Pooled Cohorts Equation Calculator  Breast Cancer Risk Calculator  Breast Cancer: Medication to Reduce Risk  FRAX Risk Assessment  ICSI Preventive Guidelines  Dietary Guidelines for Americans, 2010  USDA's MyPlate  ASA Prophylaxis  Lung CA Screening    Caprice Lares MD  [facility]    Identified Health Risks:

## 2020-11-25 ENCOUNTER — TELEPHONE (OUTPATIENT)
Dept: FAMILY MEDICINE | Facility: CLINIC | Age: 70
End: 2020-11-25

## 2020-11-25 NOTE — TELEPHONE ENCOUNTER
Completed form faxed to [employer].  Original placed at  for Pt to  on Friday.  Copy sent to  [facility] and placed in accordion file

## 2020-11-25 NOTE — TELEPHONE ENCOUNTER
Reason for Call:  Form, our goal is to have forms completed with 72 hours, however, some forms may require a visit or additional information.    Type of letter, form or note:  medical    Who is the form from?: [facility]ening Form (if other please explain)    Where did the form come from: Patient or family brought in       What clinic location was the form placed at?: [facility]    Where the form was placed: Given to MA/RN    What number is listed as a contact on the form?: [phone] (home)          Additional comments: Call patient to  original    Call taken on 11/25/2020 at 1:22 PM by Gloria Swain

## 2020-12-18 ENCOUNTER — TELEPHONE (OUTPATIENT)
Dept: FAMILY MEDICINE | Facility: CLINIC | Age: 70
End: 2020-12-18

## 2020-12-23 ENCOUNTER — OFFICE VISIT (OUTPATIENT)
Dept: DERMATOLOGY | Facility: CLINIC | Age: 70
End: 2020-12-23
Payer: COMMERCIAL

## 2020-12-23 DIAGNOSIS — Z85.828 HISTORY OF SKIN CANCER: ICD-10-CM

## 2020-12-23 DIAGNOSIS — D48.7: ICD-10-CM

## 2020-12-23 DIAGNOSIS — D48.5 NEOPLASM OF UNCERTAIN BEHAVIOR OF SKIN: Primary | ICD-10-CM

## 2020-12-23 PROCEDURE — 88305 TISSUE EXAM BY PATHOLOGIST: CPT | Performed by: DERMATOLOGY

## 2020-12-23 PROCEDURE — 11102 TANGNTL BX SKIN SINGLE LES: CPT | Mod: GC | Performed by: DERMATOLOGY

## 2020-12-23 PROCEDURE — 99212 OFFICE O/P EST SF 10 MIN: CPT | Mod: 25 | Performed by: DERMATOLOGY

## 2020-12-23 RX ORDER — LIDOCAINE HYDROCHLORIDE AND EPINEPHRINE 10; 10 MG/ML; UG/ML
3 INJECTION, SOLUTION INFILTRATION; PERINEURAL ONCE
Status: DISCONTINUED | OUTPATIENT
Start: 2020-12-23 | End: 2021-08-03

## 2020-12-23 ASSESSMENT — PAIN SCALES - GENERAL: PAINLEVEL: NO PAIN (0)

## 2020-12-23 NOTE — NURSING NOTE
Lidocaine-epinephrine 1-1:347898 % injection   1mL once for one use, starting 12/23/2020 ending 12/23/2020,  2mL disp, R-0, injection  Injected by Dr. Meléndez

## 2020-12-23 NOTE — PROGRESS NOTES
[facility] Dermatology Note      Dermatology Problem List:  1. Hx of basal cell carcinoma  - hx of several BCC removals in 4592-0358  - several biopsies of various lesions since, all benign per pt  2. Nevus lipomatosus superficialis   3. Androgenetic alopecia   - Current Rx: minoxidil 5% foam   # NUB, right upper back. S/p shave biopsy 12/23/20. DDx: ISK vs NMSC.    Encounter Date: Dec 23, 2020    CC:   Chief Complaint   Patient presents with     Skin Check     Renea, Is here for her yearly skin check.        HPI:  Ms. Renea Lerma is a 70 year old female who presents to clinic today as a return patient for FBSE. Last seen by Dr. Seals in July when she had a biopsy showing ISK, reports scar healed well. She has no significant concerns for her skin today except for a bump on her left thumb that feels bony but is tender, present for 6 months. Otherwise is in good general health without additional skin concerns.     Past Medical, Social, Family History:   Patient Active Problem List   Diagnosis     Esophageal reflux     Mild intermittent asthma     Hypothyroidism     Fibrocystic breast disease, unspecified laterality     Nonrheumatic aortic valve insufficiency     Thoracic aortic aneurysm without rupture (H)     Mixed hyperlipidemia     Shoulder arthritis     Vitamin D deficiency     Lung nodule     Nutcracker esophagus     Schatzki's ring of distal esophagus     Aortic root dilatation (H)     Chronic hoarseness     Dysphagia     Coronary artery disease involving native heart without angina pectoris, unspecified vessel or lesion type     Elevated coronary artery calcium score     Chest pain     History of endometrial cancer     Past Medical History:   Diagnosis Date     Aortic root dilatation (H)      Asthma      BCC (basal cell carcinoma of skin) 2010    rt shoulder     Chronic hoarseness     congential vocal chord closure issue     Dysphagia     esophageal dilatation per [facility] 2/2019      Elevated coronary artery calcium score     CTa calcium score total 93.5 / 73rd percentile     Endometrial ca (H)     stage 1     Family hx of colon cancer      GERD (gastroesophageal reflux disease)      Hormone replacement therapy      Hyperlipidemia LDL goal <130 11/4/2016     Hypothyroidism      Nonrheumatic aortic valve insufficiency 11/4/2016     Nutcracker esophagus      Schatzki's ring of distal esophagus      Shoulder arthritis     rt     Thoracic aortic aneurysm without rupture (H)     4.3 cm     Vitamin D deficiency 11/4/2016     Past Surgical History:   Procedure Laterality Date     BIOPSY       C VAG HYST,UTERUS >250 GMS,REM TUBE/OVARY  2000     COLONOSCOPY       COLONOSCOPY N/A 12/12/2016    Procedure: COLONOSCOPY;  Surgeon: Manjinder Vera MD;  Location:  GI     CV CORONARY ANGIOGRAM N/A 4/27/2020    Procedure: Coronary Angiogram;  Surgeon: Donaldo Rosen MD;  Location:  HEART CARDIAC CATH LAB     CV LEFT HEART CATH N/A 4/27/2020    Procedure: Left Heart Cath;  Surgeon: Donaldo Rosen MD;  Location:  HEART CARDIAC CATH LAB     CV LEFT VENTRICULOGRAM N/A 4/27/2020    Procedure: Left Ventriculogram;  Surgeon: Donaldo Rosen MD;  Location:  HEART CARDIAC CATH LAB     TONSILLECTOMY       Family History   Problem Relation Age of Onset     Diabetes Father      Social History:  Patient  reports that she has never smoked. She has never used smokeless tobacco. She reports current alcohol use. She reports that she does not use drugs.    Medications:  Current Outpatient Medications   Medication Sig Dispense Refill     albuterol (PROAIR HFA/PROVENTIL HFA/VENTOLIN HFA) 108 (90 Base) MCG/ACT inhaler Inhale 2 puffs into the lungs every 6 hours as needed for shortness of breath / dyspnea or wheezing 1 Inhaler 1     aspirin (ASA) 81 MG EC tablet Take 1 tablet (81 mg) by mouth daily       cholecalciferol (VITAMIN D3) 5000 units (125 mcg) capsule Take 5,000 Units by mouth once a week        fluticasone (FLONASE) 50 MCG/ACT spray Spray 1 spray into both nostrils daily as needed        levothyroxine (SYNTHROID/LEVOTHROID) 75 MCG tablet Take 1 tablet (75 mcg) by mouth daily 90 tablet 3     multivitamin, therapeutic with minerals (MULTI-VITAMIN) TABS tablet Take 1 tablet by mouth daily 30 each 11     rosuvastatin (CRESTOR) 10 MG tablet Take 1 tablet (10 mg) by mouth daily 90 tablet 3        Allergies:  Allergies   Allergen Reactions     Dust Mites      Mold      No Known Drug Allergies      Seasonal Allergies      Adhesive Tape Rash       ROS:  Constitutional: Otherwise feeling well today, in usual state of health.   Skin: As per HPI     Physical exam:  Vitals: There were no vitals taken for this visit.  GEN: This is a well developed, well-nourished female in no acute distress, in a pleasant mood.    PULM: Breathing comfortably in no distress  CV: Well-perfused, no cyanosis  EXTREMITIES: No deformity, no edema  SKIN:   Total skin excluding the undergarment areas was performed. The exam included the head/face, neck, both arms, chest, back, abdomen, both legs, digits and/or nails.   - Scattered brown macules on sun exposed areas.  There is a waxy stuck on tan to brown papule on the right frontal scalp within hairline.  There is a tan-pink papule with eroded center and surrounding erythema on the right upper back.   - Firm subcutaneous nodules on left first digit   - No other lesions of concern on areas examined.     ASSESSMENT/PLAN:    # NUB, right upper back. DDx: ISK vs NMSC.   - Shave biopsy today (see procedure note below)     # SK, right temple.   - Counseled on reassuring features and benign nature of diagnosis. No further management at this time.     # Firm subcutaneous nodule on left first digit. Differential includes gout tophus however will refer to orthopaedics for evaluation.   - Ortho referral     Shave biopsy procedure note: After discussion of benefits and risks including but not limited to  bleeding, infection, scar, incomplete removal, recurrence, and non-diagnostic biopsy, written consent and photographs were obtained. The area was cleaned with isopropyl alcohol. 1.5 mL of 1% lidocaine with epinephrine was injected to obtain adequate anesthesia of 1 lesion(s) on the right upper back. Shave biopsy at site(s) performed. Hemostasis was achieved with aluminium chloride. Petrolatum ointment and a sterile dressing were applied. The patient tolerated the procedure and no complications were noted. The patient was provided with verbal and written post care instructions. The patient opted for pathology results to automatically release and understands that the clinical staff will contact them as soon as possible to notify them of the results.      CC Caprice Lares MD  [address] [facility] [place] [address] [address] on close of this encounter.    Follow-up in 1 year, earlier for new or changing lesions.     Patient was staffed with Dr. Keenan Meléndez MD  Dermatology Resident  I was present for key portions of the procedure. Zayda Hussein MD  I, Zayda Hussein MD, saw this patient with the resident and agree with the resident s findings and plan of care as documented in the resident s note.

## 2020-12-23 NOTE — LETTER
12/23/2020       RE: Renea Lerma  [address] [facility] [address]     Dear Colleague,    Thank you for referring your patient, Renea Lerma, to the [facility] DERMATOLOGY [facility] [place] [facility]. Please see a copy of my visit note below.    [facility] Dermatology Note      Dermatology Problem List:  1. Hx of basal cell carcinoma  - hx of several BCC removals in 7541-8497  - several biopsies of various lesions since, all benign per pt  2. Nevus lipomatosus superficialis   3. Androgenetic alopecia   - Current Rx: minoxidil 5% foam   # NUB, right upper back. S/p shave biopsy 12/23/20. DDx: ISK vs NMSC.    Encounter Date: Dec 23, 2020    CC:   Chief Complaint   Patient presents with     Skin Check     Renea, Is here for her yearly skin check.        HPI:  Ms. Renea Lerma is a 70 year old female who presents to clinic today as a return patient for FBSE. Last seen by Dr. Seals in July when she had a biopsy showing ISK, reports scar healed well. She has no significant concerns for her skin today except for a bump on her left thumb that feels bony but is tender, present for 6 months. Otherwise is in good general health without additional skin concerns.     Past Medical, Social, Family History:   Patient Active Problem List   Diagnosis     Esophageal reflux     Mild intermittent asthma     Hypothyroidism     Fibrocystic breast disease, unspecified laterality     Nonrheumatic aortic valve insufficiency     Thoracic aortic aneurysm without rupture (H)     Mixed hyperlipidemia     Shoulder arthritis     Vitamin D deficiency     Lung nodule     Nutcracker esophagus     Schatzki's ring of distal esophagus     Aortic root dilatation (H)     Chronic hoarseness     Dysphagia     Coronary artery disease involving native heart without angina pectoris, unspecified vessel or lesion type     Elevated coronary artery calcium score     Chest  pain     History of endometrial cancer     Past Medical History:   Diagnosis Date     Aortic root dilatation (H)      Asthma      BCC (basal cell carcinoma of skin) 2010    rt shoulder     Chronic hoarseness     congential vocal chord closure issue     Dysphagia     esophageal dilatation per [facility] 2/2019     Elevated coronary artery calcium score     CTa calcium score total 93.5 / 73rd percentile     Endometrial ca (H)     stage 1     Family hx of colon cancer      GERD (gastroesophageal reflux disease)      Hormone replacement therapy      Hyperlipidemia LDL goal <130 11/4/2016     Hypothyroidism      Nonrheumatic aortic valve insufficiency 11/4/2016     Nutcracker esophagus      Schatzki's ring of distal esophagus      Shoulder arthritis     rt     Thoracic aortic aneurysm without rupture (H)     4.3 cm     Vitamin D deficiency 11/4/2016     Past Surgical History:   Procedure Laterality Date     BIOPSY       C VAG HYST,UTERUS >250 GMS,REM TUBE/OVARY  2000     COLONOSCOPY       COLONOSCOPY N/A 12/12/2016    Procedure: COLONOSCOPY;  Surgeon: Manjinder Vera MD;  Location:  GI     CV CORONARY ANGIOGRAM N/A 4/27/2020    Procedure: Coronary Angiogram;  Surgeon: Donaldo Rosen MD;  Location:  HEART CARDIAC CATH LAB     CV LEFT HEART CATH N/A 4/27/2020    Procedure: Left Heart Cath;  Surgeon: Donaldo Rosen MD;  Location:  HEART CARDIAC CATH LAB     CV LEFT VENTRICULOGRAM N/A 4/27/2020    Procedure: Left Ventriculogram;  Surgeon: Donaldo Rosen MD;  Location:  HEART CARDIAC CATH LAB     TONSILLECTOMY       Family History   Problem Relation Age of Onset     Diabetes Father      Social History:  Patient  reports that she has never smoked. She has never used smokeless tobacco. She reports current alcohol use. She reports that she does not use drugs.    Medications:  Current Outpatient Medications   Medication Sig Dispense Refill     albuterol (PROAIR HFA/PROVENTIL HFA/VENTOLIN HFA) 108 (90 Base)  MCG/ACT inhaler Inhale 2 puffs into the lungs every 6 hours as needed for shortness of breath / dyspnea or wheezing 1 Inhaler 1     aspirin (ASA) 81 MG EC tablet Take 1 tablet (81 mg) by mouth daily       cholecalciferol (VITAMIN D3) 5000 units (125 mcg) capsule Take 5,000 Units by mouth once a week       fluticasone (FLONASE) 50 MCG/ACT spray Spray 1 spray into both nostrils daily as needed        levothyroxine (SYNTHROID/LEVOTHROID) 75 MCG tablet Take 1 tablet (75 mcg) by mouth daily 90 tablet 3     multivitamin, therapeutic with minerals (MULTI-VITAMIN) TABS tablet Take 1 tablet by mouth daily 30 each 11     rosuvastatin (CRESTOR) 10 MG tablet Take 1 tablet (10 mg) by mouth daily 90 tablet 3        Allergies:  Allergies   Allergen Reactions     Dust Mites      Mold      No Known Drug Allergies      Seasonal Allergies      Adhesive Tape Rash       ROS:  Constitutional: Otherwise feeling well today, in usual state of health.   Skin: As per HPI     Physical exam:  Vitals: There were no vitals taken for this visit.  GEN: This is a well developed, well-nourished female in no acute distress, in a pleasant mood.    PULM: Breathing comfortably in no distress  CV: Well-perfused, no cyanosis  EXTREMITIES: No deformity, no edema  SKIN:   Total skin excluding the undergarment areas was performed. The exam included the head/face, neck, both arms, chest, back, abdomen, both legs, digits and/or nails.   - Scattered brown macules on sun exposed areas.  There is a waxy stuck on tan to brown papule on the right frontal scalp within hairline.  There is a tan-pink papule with eroded center and surrounding erythema on the right upper back.   - Firm subcutaneous nodules on left first digit   - No other lesions of concern on areas examined.     ASSESSMENT/PLAN:    # NUB, right upper back. DDx: ISK vs NMSC.   - Shave biopsy today (see procedure note below)     # SK, right temple.   - Counseled on reassuring features and benign nature of  diagnosis. No further management at this time.     # Firm subcutaneous nodule on left first digit. Differential includes gout tophus however will refer to orthopaedics for evaluation.   - Ortho referral     Shave biopsy procedure note: After discussion of benefits and risks including but not limited to bleeding, infection, scar, incomplete removal, recurrence, and non-diagnostic biopsy, written consent and photographs were obtained. The area was cleaned with isopropyl alcohol. 1.5 mL of 1% lidocaine with epinephrine was injected to obtain adequate anesthesia of 1 lesion(s) on the right upper back. Shave biopsy at site(s) performed. Hemostasis was achieved with aluminium chloride. Petrolatum ointment and a sterile dressing were applied. The patient tolerated the procedure and no complications were noted. The patient was provided with verbal and written post care instructions. The patient opted for pathology results to automatically release and understands that the clinical staff will contact them as soon as possible to notify them of the results.      CC Caprice Lares MD  [address] [address] on close of this encounter.    Follow-up in 1 year, earlier for new or changing lesions.     Patient was staffed with Dr. Keenan Meléndez MD  Dermatology Resident  I was present for key portions of the procedure. Zayda Hussein MD  I, Zayda Hussein MD, saw this patient with the resident and agree with the resident s findings and plan of care as documented in the resident s note.

## 2020-12-23 NOTE — NURSING NOTE
Chief Complaint   Patient presents with     Skin Check     Renea, Is here for her yearly skin check.      Kranthi Mosher EMT

## 2020-12-23 NOTE — PATIENT INSTRUCTIONS
Please apply a good moisturizer to all skin like Cera-Ve, Kendra-cream, or Cetaphil. It is best to apply this right after bathing to lock in moisture.       Wound Care After a Biopsy    What is a skin biopsy?  A skin biopsy allows the doctor to examine a very small piece of tissue under the microscope to determine the diagnosis and the best treatment for the skin condition. A local anesthetic (numbing medicine)  is injected with a very small needle into the skin area to be tested. A small piece of skin is taken from the area. Sometimes a suture (stitch) is used.     What are the risks of a skin biopsy?  I will experience scar, bleeding, swelling, pain, crusting and redness. I may experience incomplete removal or recurrence. Risks of this procedure are excessive bleeding, bruising, infection, nerve damage, numbness, thick (hypertrophic or keloidal) scar and non-diagnostic biopsy.    How should I care for my wound for the first 24 hours?    Keep the wound dry and covered for 24 hours    If it bleeds, hold direct pressure on the area for 15 minutes. If bleeding does not stop then go to the emergency room    Avoid strenuous exercise the first 1-2 days or as your doctor instructs you    How should I care for the wound after 24 hours?    After 24 hours, remove the bandage    You may bathe or shower as normal    If you had a scalp biopsy, you can shampoo as usual and can use shower water to clean the biopsy site daily    Clean the wound twice a day with gentle soap and water    Do not scrub, be gentle    Apply white petroleum/Vaseline after cleaning the wound with a cotton swab or a clean finger, and keep the site covered with a Bandaid /bandage. Bandages are not necessary with a scalp biopsy    If you are unable to cover the site with a Bandaid /bandage, re-apply ointment 2-3 times a day to keep the site moist. Moisture will help with healing    Avoid strenuous activity for first 1-2 days    Avoid lakes, rivers, pools, and  oceans until the stitches are removed or the site is healed    How do I clean my wound?    Wash hands thoroughly with soap or use hand  before all wound care    Clean the wound with gentle soap and water    Apply white petroleum/Vaseline  to wound after it is clean    Replace the Bandaid /bandage to keep the wound covered for the first few days or as instructed by your doctor    If you had a scalp biopsy, warm shower water to the area on a daily basis should suffice    What should I use to clean my wound?     Cotton-tipped applicators (Qtips )    White petroleum jelly (Vaseline ). Use a clean new container and use Q-tips to apply.    Bandaids   as needed    Gentle soap     How should I care for my wound long term?    Do not get your wound dirty    Keep up with wound care for one week or until the area is healed.    A small scab will form and fall off by itself when the area is completely healed. The area will be red and will become pink in color as it heals. Sun protection is very important for how your scar will turn out. Sunscreen with an SPF 30 or greater is recommended once the area is healed.    If you have stitches, stitches need to be removed in 10-14 days. You may return to our clinic for this or you may have it done locally at your doctor s office.    You should have some soreness but it should be mild and slowly go away over several days. Talk to your doctor about using tylenol for pain,    When should I call my doctor?  If you have increased:     Pain or swelling    Pus or drainage (clear or slightly yellow drainage is ok)    Temperature over 100F    Spreading redness or warmth around wound    When will I hear about my results?  The biopsy results can take 2-3 weeks to come back. The clinic will call you with the results, send you a Wero[employer] message, or have you schedule a follow-up clinic or phone time to discuss the results. Contact our clinics if you do not hear from us in 3 weeks.     Who  should I call with questions?    [facility]: [phone]     [facility]: [phone]    For urgent needs outside of business hours call the [facility] at [phone] and ask for the dermatology resident on call

## 2020-12-27 LAB — COPATH REPORT: NORMAL

## 2021-01-11 ENCOUNTER — TELEPHONE (OUTPATIENT)
Dept: CARDIOLOGY | Facility: CLINIC | Age: 71
End: 2021-01-11

## 2021-01-11 ENCOUNTER — TELEPHONE (OUTPATIENT)
Dept: FAMILY MEDICINE | Facility: CLINIC | Age: 71
End: 2021-01-11

## 2021-01-11 NOTE — TELEPHONE ENCOUNTER
+ Covid result 1-9-2020.  Patient wondering what to do.    Patient will contact PCP for recommendations.

## 2021-01-11 NOTE — TELEPHONE ENCOUNTER
Incoming call from patient    Called Dr Casey -had some heart issues    Nurse said she should call back and let us know she tested positive for COVID19     Taking mucinex, not feeling great, not feeling worse but unsure if anything further to be doing. 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    Getting extra rest.    Drink extra fluids 6 to 8 glasses of liquids each day), unless a doctor has told you not to. Ask your care team which fluids are best for you. Avoid fluids that contain caffeine or alcohol.    Taking over-the-counter (OTC) medicine to reduce pain and fever. Your care team will tell you which medicine to use.    Discussed HC measures and when to call back     If no improvement or worsening sx agrees to call back     Jessica JULIO RN

## 2021-01-18 ENCOUNTER — TELEPHONE (OUTPATIENT)
Dept: FAMILY MEDICINE | Facility: CLINIC | Age: 71
End: 2021-01-18

## 2021-01-18 ENCOUNTER — VIRTUAL VISIT (OUTPATIENT)
Dept: FAMILY MEDICINE | Facility: CLINIC | Age: 71
End: 2021-01-18
Payer: COMMERCIAL

## 2021-01-18 DIAGNOSIS — U07.1 CLINICAL DIAGNOSIS OF COVID-19: Primary | ICD-10-CM

## 2021-01-18 PROCEDURE — 99213 OFFICE O/P EST LOW 20 MIN: CPT | Mod: GT | Performed by: NURSE PRACTITIONER

## 2021-01-18 NOTE — TELEPHONE ENCOUNTER
"Incoming call from patient     Called Monday     Dx with COVID19     Some \"health issues\" - definitely better, was very sick     Last Thursday, then better     Can't get rid of congestion/pain tightness     Wondering if she should be taking something else? Is worried about developing pneumonia and wanting something stronger than mucinex for her congestion   Tried Mucinex DM but can't swallow those - doing liquid instead, not helping    Hx of asthma and heart concerns  And would like to do a video visit with a provider, today if possible. If not then tomorrow     Added to team schedule    "

## 2021-01-18 NOTE — PROGRESS NOTES
Renea is a 70 year old who is being evaluated via a billable video visit.      How would you like to obtain your AVS? MyChart  If the video visit is dropped, the invitation should be resent by: Text to cell phone: [phone]  Will anyone else be joining your video visit? No    Video Start Time: 3:40 PM  Assessment & Plan   Problem List Items Addressed This Visit     None      Visit Diagnoses     Clinical diagnosis of COVID-19    -  Primary           Wondering about tx needed for positive covid. She is still having a lot of chest tightness and sinus congestion. Her friends weren't sick as long as she has been. She has not yet used her inhaler so recommended she us that if needed. Her symptoms are common for what I have been seeing with covid. She will monitor O2 and symptoms and present to ED with O2 <90%.      DANIEL Pavon CNP  [facility]    Subjective     Renea is a 70 year old who presents to clinic today for the following health issues     HPI     Recent diagnosis of COVID and can't get rid of congestion and chest tightness and some discomfort, headache is present as well as some fatigue. Hx of asthma and heart concerns, has been taking Mucinex DM in liquid form as she has been having difficulty with taking the tablets    Got sick about 10 days ago   Last Thursday was feeling a little better  Now has chest congestion and in head as well   Has been taking Mucinex DM   Has asthma and heart issues   O2 has been %   Also developed a rash-little red dots all over   Tmax 100    Past Medical History:   Diagnosis Date     Aortic root dilatation (H)      Asthma      BCC (basal cell carcinoma of skin) 2010    rt shoulder     Chronic hoarseness     congential vocal chord closure issue     Dysphagia     esophageal dilatation per [facility] 2/2019     Elevated coronary artery calcium score     CTa calcium score total 93.5 / 73rd percentile     Endometrial ca (H)     stage 1     Family hx of  colon cancer      GERD (gastroesophageal reflux disease)      Hormone replacement therapy      Hyperlipidemia LDL goal <130 11/4/2016     Hypothyroidism      Nonrheumatic aortic valve insufficiency 11/4/2016     Nutcracker esophagus      Schatzki's ring of distal esophagus      Shoulder arthritis     rt     Thoracic aortic aneurysm without rupture (H)     4.3 cm     Vitamin D deficiency 11/4/2016     Family History   Problem Relation Age of Onset     Diabetes Father      Past Surgical History:   Procedure Laterality Date     BIOPSY       C VAG HYST,UTERUS >250 GMS,REM TUBE/OVARY  2000     COLONOSCOPY       COLONOSCOPY N/A 12/12/2016    Procedure: COLONOSCOPY;  Surgeon: Manjinder Vera MD;  Location:  GI     CV CORONARY ANGIOGRAM N/A 4/27/2020    Procedure: Coronary Angiogram;  Surgeon: Donaldo Rosen MD;  Location:  HEART CARDIAC CATH LAB     CV LEFT HEART CATH N/A 4/27/2020    Procedure: Left Heart Cath;  Surgeon: Donaldo Rosen MD;  Location:  HEART CARDIAC CATH LAB     CV LEFT VENTRICULOGRAM N/A 4/27/2020    Procedure: Left Ventriculogram;  Surgeon: Donaldo Rosen MD;  Location:  HEART CARDIAC CATH LAB     TONSILLECTOMY       Social History     Tobacco Use     Smoking status: Never Smoker     Smokeless tobacco: Never Used   Substance Use Topics     Alcohol use: Yes     Comment: 1 glass of wine per week     Current Outpatient Medications   Medication Sig Dispense Refill     albuterol (PROAIR HFA/PROVENTIL HFA/VENTOLIN HFA) 108 (90 Base) MCG/ACT inhaler Inhale 2 puffs into the lungs every 6 hours as needed for shortness of breath / dyspnea or wheezing 1 Inhaler 1     aspirin (ASA) 81 MG EC tablet Take 1 tablet (81 mg) by mouth daily       cholecalciferol (VITAMIN D3) 5000 units (125 mcg) capsule Take 5,000 Units by mouth once a week       fluticasone (FLONASE) 50 MCG/ACT spray Spray 1 spray into both nostrils daily as needed        levothyroxine (SYNTHROID/LEVOTHROID) 75 MCG tablet Take 1  tablet (75 mcg) by mouth daily 90 tablet 3     multivitamin, therapeutic with minerals (MULTI-VITAMIN) TABS tablet Take 1 tablet by mouth daily 30 each 11     rosuvastatin (CRESTOR) 10 MG tablet Take 1 tablet (10 mg) by mouth daily 90 tablet 3     Allergies   Allergen Reactions     Dust Mites      Mold      No Known Drug Allergies      Seasonal Allergies      Adhesive Tape Rash       Reviewed and updated as needed this visit by clinical staff and provider     Review of Systems   Detailed as above       Objective           Vitals:  No vitals were obtained today due to virtual visit.    Physical Exam   GENERAL: Healthy, alert and no distress  EYES: Eyes grossly normal to inspection.  No discharge or erythema, or obvious scleral/conjunctival abnormalities.  RESP: No audible wheeze, or visible cyanosis.  No visible retractions or increased work of breathing.  Rare cough   SKIN: Visible skin clear. No significant rash, abnormal pigmentation or lesions.  NEURO: Cranial nerves grossly intact.  Mentation and speech appropriate for age.  PSYCH: Mentation appears normal, affect normal/bright, judgement and insight intact, normal speech and appearance well-groomed.        Video-Visit Details    Type of service:  Video Visit    Video End Time:3:58 PM    Originating Location (pt. Location): Home    Distant Location (provider location):  [facility]     Platform used for Video Visit: Yandy

## 2021-03-10 ENCOUNTER — NURSE TRIAGE (OUTPATIENT)
Dept: FAMILY MEDICINE | Facility: CLINIC | Age: 71
End: 2021-03-10

## 2021-03-10 DIAGNOSIS — M62.830 BACK MUSCLE SPASM: Primary | ICD-10-CM

## 2021-03-10 RX ORDER — CYCLOBENZAPRINE HCL 5 MG
5 TABLET ORAL
Qty: 10 TABLET | Refills: 0 | Status: SHIPPED | OUTPATIENT
Start: 2021-03-10 | End: 2021-03-10

## 2021-03-10 RX ORDER — CYCLOBENZAPRINE HCL 5 MG
5 TABLET ORAL
Qty: 10 TABLET | Refills: 0 | Status: SHIPPED | OUTPATIENT
Start: 2021-03-10 | End: 2021-05-19

## 2021-03-10 NOTE — TELEPHONE ENCOUNTER
[phone] call pt .Aleyda Abbott RN      Additional Information    Negative: Passed out (i.e., fainted, collapsed and was not responding)    Negative: Shock suspected (e.g., cold/pale/clammy skin, too weak to stand, low BP, rapid pulse)    Negative: Sounds like a life-threatening emergency to the triager    Negative: Major injury to the back (e.g., MVA, fall > 10 feet or 3 meters, penetrating injury, etc.)    Negative: Pain in the upper back over the ribs (rib cage) that radiates (travels) into the chest    Negative: Pain in the upper back over the ribs (rib cage) and worsened by coughing (or clearly increases with breathing)    Negative: Pain radiates into groin, scrotum    Negative: Blood in urine (red, pink, or tea-colored)    Negative: Vomiting and pain over lower ribs of back (i.e., flank - kidney area)    Negative: Weakness of a leg or foot (e.g., unable to bear weight, dragging foot)    Negative: Patient sounds very sick or weak to the triager    Negative: SEVERE back pain of sudden onset and age > 60    Negative: SEVERE abdominal pain (e.g., excruciating)    Negative: Abdominal pain and age > 60    Negative: Unable to urinate (or only a few drops) and bladder feels very full    Negative: Loss of bladder or bowel control (urine or bowel incontinence; wetting self, leaking stool) of new onset    Negative: Numbness (loss of sensation) in groin or rectal area    Negative: Fever > 100.5 F (38.1 C) and flank pain    Negative: Pain or burning with passing urine (urination)    Negative: SEVERE back pain (e.g., excruciating, unable to do any normal activities) and not improved after pain medicine and CARE ADVICE    Negative: Numbness in an arm or hand (i.e., loss of sensation) and upper back pain    Negative: Numbness in a leg or foot (i.e., loss of sensation)    Negative: High-risk adult (e.g., history of cancer, history of HIV, or history of IV drug abuse)    Negative: Painful rash with multiple small  "blisters grouped together (i.e., dermatomal distribution or 'band' or 'stripe')    Negative: Pain radiates into the thigh or further down the leg, and in both legs    Negative: Age > 50 and no history of prior similar back pain    MODERATE back pain (e.g., interferes with normal activities) and present > 3 days    Answer Assessment - Initial Assessment Questions  1. ONSET: \"When did the pain begin?\"       yesterday  2. LOCATION: \"Where does it hurt?\" (upper, mid or lower back)      lower  3. SEVERITY: \"How bad is the pain?\"  (e.g., Scale 1-10; mild, moderate, or severe)    - MILD (1-3): doesn't interfere with normal activities     - MODERATE (4-7): interferes with normal activities or awakens from sleep     - SEVERE (8-10): excruciating pain, unable to do any normal activities       Moderate to severe  4. PATTERN: \"Is the pain constant?\" (e.g., yes, no; constant, intermittent)       Yes constant  5. RADIATION: \"Does the pain shoot into your legs or elsewhere?\"      no  6. CAUSE:  \"What do you think is causing the back pain?\"       Moving into home in [place]  7. BACK OVERUSE:  \"Any recent lifting of heavy objects, strenuous work or exercise?\"      yes  8. MEDICATIONS: \"What have you taken so far for the pain?\" (e.g., nothing, acetaminophen, NSAIDS)      Will try iburpofen but has nothing currently at home in [place]   9. NEUROLOGIC SYMPTOMS: \"Do you have any weakness, numbness, or problems with bowel/bladder control?\"      no  10. OTHER SYMPTOMS: \"Do you have any other symptoms?\" (e.g., fever, abdominal pain, burning with urination, blood in urine)        no  11. PREGNANCY: \"Is there any chance you are pregnant?\" (e.g., yes, no; LMP)        no    Protocols used: BACK PAIN-A-OH      "

## 2021-05-07 ENCOUNTER — TELEPHONE (OUTPATIENT)
Dept: CARDIOLOGY | Facility: CLINIC | Age: 71
End: 2021-05-07

## 2021-05-07 DIAGNOSIS — I35.1 NONRHEUMATIC AORTIC VALVE INSUFFICIENCY: Primary | ICD-10-CM

## 2021-05-07 DIAGNOSIS — I25.10 CORONARY ARTERY DISEASE INVOLVING NATIVE HEART WITHOUT ANGINA PECTORIS, UNSPECIFIED VESSEL OR LESION TYPE: ICD-10-CM

## 2021-05-07 DIAGNOSIS — I77.810 AORTIC ROOT DILATATION (H): ICD-10-CM

## 2021-05-07 DIAGNOSIS — I71.20 THORACIC AORTIC ANEURYSM WITHOUT RUPTURE (H): ICD-10-CM

## 2021-05-07 NOTE — TELEPHONE ENCOUNTER
Spoke with Patient who agrees with having a stress echo and then f/up with Charisse.  Orders placed.

## 2021-05-07 NOTE — TELEPHONE ENCOUNTER
----- Message from Maryellen Chang RN sent at 5/6/2021  2:43 PM CDT -----  See reply from Dr Casey- get stress echo (ordered for Nov) and have her see Charisse. Thanks!   Maryellen   ----- Message -----  From: Kathia Casey MD  Sent: 5/6/2021   2:35 PM CDT  To: Maryellen Chang RN    Sure let's get the stress echo and have her see Charisse. Thanks.     ----- Message -----  From: Maryellen Chang RN  Sent: 5/6/2021   2:14 PM CDT  To: Kathia Casey MD, [facility] Heart Team 2    See message below from scheduling. Pt had COVID in January per notes. She has orders for a stress echo and MD follow up for November. Do you want to try and get these while she is here? Thoughts?     Maryellen   ----- Message -----  From: Isela Pritchard  Sent: 5/6/2021   1:37 PM CDT  To: [facility] Heart Team 2    Hello,       This patient called and she has moved out of town but will be in town from 5/12/to 5/19 and she had covid Jan 9th and then had Covid pneumonia but said she was told to come and get checked to see that she did not have any damage to her heart in 3 months. She wants to know if she needs any test to check for heart damage. There are no availability with Dr Casey but Charisse Akash has an 8 am on 5/19 and she did see her in 2020. Should I schedule her?    Thank you      Isela

## 2021-05-12 ENCOUNTER — TELEPHONE (OUTPATIENT)
Dept: CARDIOLOGY | Facility: CLINIC | Age: 71
End: 2021-05-12

## 2021-05-12 DIAGNOSIS — I25.10 CORONARY ARTERY DISEASE INVOLVING NATIVE HEART WITHOUT ANGINA PECTORIS, UNSPECIFIED VESSEL OR LESION TYPE: Primary | ICD-10-CM

## 2021-05-12 NOTE — TELEPHONE ENCOUNTER
Call from patient, she would also like to have a CXR ordered. She is very worried that she has some lung damage post-COVID and pneumonia.    Patient has moved to [place] but will be back for a week and is scheduled for a stress echo 5/13/2021 and to see LOLA Charisse Bustos on 5/19/2021. She notes that she plans to be back in [place] every summer and wants to keep Dr. Casey as one of her doctors.    Will message Dr. Casey to review

## 2021-05-13 ENCOUNTER — HOSPITAL ENCOUNTER (OUTPATIENT)
Dept: CARDIOLOGY | Facility: CLINIC | Age: 71
Discharge: HOME | End: 2021-05-13
Attending: INTERNAL MEDICINE | Admitting: INTERNAL MEDICINE
Payer: COMMERCIAL

## 2021-05-13 DIAGNOSIS — I35.1 NONRHEUMATIC AORTIC VALVE INSUFFICIENCY: ICD-10-CM

## 2021-05-13 DIAGNOSIS — I25.10 CORONARY ARTERY DISEASE INVOLVING NATIVE HEART WITHOUT ANGINA PECTORIS, UNSPECIFIED VESSEL OR LESION TYPE: ICD-10-CM

## 2021-05-13 DIAGNOSIS — I77.810 AORTIC ROOT DILATATION (H): ICD-10-CM

## 2021-05-13 DIAGNOSIS — I71.20 THORACIC AORTIC ANEURYSM WITHOUT RUPTURE (H): ICD-10-CM

## 2021-05-13 DIAGNOSIS — R07.9 CHEST PAIN: Primary | ICD-10-CM

## 2021-05-13 PROCEDURE — 93325 DOPPLER ECHO COLOR FLOW MAPG: CPT | Mod: 26 | Performed by: INTERNAL MEDICINE

## 2021-05-13 PROCEDURE — 93321 DOPPLER ECHO F-UP/LMTD STD: CPT | Mod: TC

## 2021-05-13 PROCEDURE — 93018 CV STRESS TEST I&R ONLY: CPT | Performed by: INTERNAL MEDICINE

## 2021-05-13 PROCEDURE — 93350 STRESS TTE ONLY: CPT | Mod: 26 | Performed by: INTERNAL MEDICINE

## 2021-05-13 PROCEDURE — 93321 DOPPLER ECHO F-UP/LMTD STD: CPT | Mod: 26 | Performed by: INTERNAL MEDICINE

## 2021-05-13 PROCEDURE — 255N000002 HC RX 255 OP 636: Performed by: INTERNAL MEDICINE

## 2021-05-13 PROCEDURE — 93016 CV STRESS TEST SUPVJ ONLY: CPT | Performed by: INTERNAL MEDICINE

## 2021-05-13 RX ADMIN — HUMAN ALBUMIN MICROSPHERES AND PERFLUTREN 9 ML: 10; .22 INJECTION, SOLUTION INTRAVENOUS at 10:27

## 2021-05-17 ENCOUNTER — RECORDS - HEALTHEAST (OUTPATIENT)
Dept: ADMINISTRATIVE | Facility: OTHER | Age: 71
End: 2021-05-17

## 2021-05-17 ENCOUNTER — RECORDS - HEALTHEAST (OUTPATIENT)
Dept: GENERAL RADIOLOGY | Facility: CLINIC | Age: 71
End: 2021-05-17

## 2021-05-17 ENCOUNTER — TRANSFERRED RECORDS (OUTPATIENT)
Dept: HEALTH INFORMATION MANAGEMENT | Facility: CLINIC | Age: 71
End: 2021-05-17

## 2021-05-17 DIAGNOSIS — I25.10 ATHEROSCLEROTIC HEART DISEASE OF NATIVE CORONARY ARTERY WITHOUT ANGINA PECTORIS: ICD-10-CM

## 2021-05-19 ENCOUNTER — OFFICE VISIT (OUTPATIENT)
Dept: CARDIOLOGY | Facility: CLINIC | Age: 71
End: 2021-05-19
Attending: INTERNAL MEDICINE
Payer: COMMERCIAL

## 2021-05-19 VITALS
WEIGHT: 132.3 LBS | HEIGHT: 63 IN | HEART RATE: 72 BPM | BODY MASS INDEX: 23.44 KG/M2 | OXYGEN SATURATION: 99 % | SYSTOLIC BLOOD PRESSURE: 118 MMHG | DIASTOLIC BLOOD PRESSURE: 70 MMHG

## 2021-05-19 DIAGNOSIS — R05.9 COUGH: Primary | ICD-10-CM

## 2021-05-19 DIAGNOSIS — I71.20 THORACIC AORTIC ANEURYSM WITHOUT RUPTURE (H): ICD-10-CM

## 2021-05-19 DIAGNOSIS — I35.1 NONRHEUMATIC AORTIC VALVE INSUFFICIENCY: ICD-10-CM

## 2021-05-19 DIAGNOSIS — I25.10 CORONARY ARTERY DISEASE INVOLVING NATIVE HEART WITHOUT ANGINA PECTORIS, UNSPECIFIED VESSEL OR LESION TYPE: ICD-10-CM

## 2021-05-19 DIAGNOSIS — I77.810 AORTIC ROOT DILATATION (H): ICD-10-CM

## 2021-05-19 PROCEDURE — 99215 OFFICE O/P EST HI 40 MIN: CPT | Performed by: PHYSICIAN ASSISTANT

## 2021-05-19 ASSESSMENT — MIFFLIN-ST. JEOR: SCORE: 1084.24

## 2021-05-19 NOTE — PROGRESS NOTES
Cardiology Clinic Progress Note    Renea Lerma MRN# [id number]   YOB: 1950 Age: 71 year old   Primary cardiologist: Dr. Casey         Assessment and Plan:     In summary, Renea Lerma presents today for follow up on mild, non-obstructive CAD. Stress echocardiogram last week showed excellent exercise capacity and no evidence of ischemia. She is on adequate medical therapy including aspirin and statin, with an LDL of 60 as of November. No changes are recommended from a cardiac standpoint.   From a pulmonary standpoint, she has concerns over a lingering cough and dyspnea since her clinical diagnosis of COVID-19 in January. CXR is benign. I have offered a CT scan however she is hesitant due to radiation exposure. She'll contact her PCP after our visit today for recommendations.     Follow-up:  - with Dr. Casey in 1 year.         History of Presenting Illness:      Renea Lerma is a pleasant 71 year old patient who presents today for follow up on recent testing.    The patient has a history of the following -   # Mild CAD per angiogram 4/2020  # Dilated ascending thoracic aorta  # Hyperlipidemia  # Paroxysmal SVT, asymptomatic  # Chronic chest pain    Renea presents today for an annual follow up visit. She's actually early for this, about six months out, because she's had some concerns after being clinically diagnosed with COVID-19 in January. Since that time, she's felt a little restricted in her breathing, with a nagging cough and just hasn't felt she can get as deep a breath as she'd like. Her chest has felt tight as well. Mentions that she's really disappointed in the care she received during COVID - states she expected to have some testing or be treated with some form of medication but was not.   She was supposed to have a stress echo later this year to follow up on her CAD, so we decided to perform that now. This was performed on 5/13, and results were excellent. Her exercise capacity was  "above average. She had a normal HR/BP response to exercise. No ischemic changes were seen with exercise, on EKG or echo. There was mild AI, MR and TR, stable.   Of note, her aorta was measured at 43.2 mm on CTA from Feb 2020.  She also had a CXR performed which showed \"possible slight hyperinflation\" otherwise unremarkable.  LDL in November was 60.     We reviewed her testing today, which she's glad to hear. However she still voices concerns about her lungs and wants to know where she should go from here. She returns to [place] next week for a few months. She denies orthopnea, edema, claudication, palpitations, near syncope or syncope. Her voice sounds a little hoarse but her lungs are clear on exam. BP is well-controlled in clinic. O2 sat is 99%.         Review of Systems:     12-pt ROS is negative except for as noted in the HPI.          Physical Exam:     Vitals: /70   Pulse 72   Ht 1.6 m (5' 3\")   Wt 60 kg (132 lb 4.8 oz)   SpO2 99%   BMI 23.44 kg/m    Wt Readings from Last 10 Encounters:   05/19/21 60 kg (132 lb 4.8 oz)   11/16/20 64 kg (141 lb)   11/16/20 64.3 kg (141 lb 12.8 oz)   08/19/20 65 kg (143 lb 4.8 oz)   04/27/20 64 kg (141 lb)   03/27/20 62.4 kg (137 lb 9.6 oz)   03/05/20 64.5 kg (142 lb 4.8 oz)   02/20/20 62.6 kg (138 lb)   11/11/19 63 kg (139 lb)   11/08/18 60.8 kg (134 lb)       Constitutional:  Patient is pleasant, alert, cooperative, and in NAD.  HEENT:  NCAT. PERRLA. EOM's intact.   Neck:  CVP appears normal. No carotid bruits.   Pulmonary: Normal respiratory effort. CTAB.   Cardiac: RRR, normal S1/S2, no S3/S4, no murmur or rub.   Abdomen:  Non-tender abdomen, no hepatosplenomegaly appreciated.   Vascular: Pulses in the upper and lower extremities are 2+ and equal bilaterally.  Extremities: No edema, erythema, cyanosis or tenderness appreciated.  Skin:  No rashes or lesions appreciated.   Neurological:  No gross motor or sensory deficits.   Psych: Appropriate affect.        Data: "     Labs reviewed:  Recent Labs   Lab Test 11/10/20  0904 08/19/20  1314 04/27/20  0705 02/20/20  0952 11/14/19  0854 11/08/18  0957 11/08/18  0957   LDL 60  --  51 140*  --    < > 121*   HDL 77  --  80 82  --    < > 77   NHDL 75  --  66 160*  --    < > 140*   CHOL 152  --  146 242*  --    < > 217*   TRIG 74  --  73 102  --    < > 93   TSH  --  0.59  --   --  1.25  --  1.13    < > = values in this interval not displayed.       Lab Results   Component Value Date    WBC 5.5 04/27/2020    RBC 3.79 (L) 04/27/2020    HGB 11.6 (L) 04/27/2020    HCT 33.8 (L) 04/27/2020    MCV 89 04/27/2020    MCH 30.6 04/27/2020    MCHC 34.3 04/27/2020    RDW 12.9 04/27/2020     04/27/2020       Lab Results   Component Value Date     11/10/2020    POTASSIUM 4.3 11/10/2020    CHLORIDE 109 11/10/2020    CO2 24 11/10/2020    ANIONGAP 6 11/10/2020    GLC 83 11/10/2020    BUN 17 11/10/2020    CR 0.74 11/10/2020    GFRESTIMATED 82 11/10/2020    GFRESTBLACK >90 11/10/2020    HAWK 9.2 11/10/2020      Lab Results   Component Value Date    AST 21 11/04/2019    ALT 28 11/10/2020       No results found for: A1C    Lab Results   Component Value Date    INR 1.05 04/27/2020           Problem List:     Patient Active Problem List   Diagnosis     Esophageal reflux     Mild intermittent asthma     Hypothyroidism     Fibrocystic breast disease, unspecified laterality     Nonrheumatic aortic valve insufficiency     Thoracic aortic aneurysm without rupture (H)     Mixed hyperlipidemia     Shoulder arthritis     Vitamin D deficiency     Lung nodule     Nutcracker esophagus     Schatzki's ring of distal esophagus     Aortic root dilatation (H)     Chronic hoarseness     Dysphagia     Coronary artery disease involving native heart without angina pectoris, unspecified vessel or lesion type     Elevated coronary artery calcium score     Chest pain     History of endometrial cancer           Medications:     Current Outpatient Medications   Medication  Sig Dispense Refill     albuterol (PROAIR HFA/PROVENTIL HFA/VENTOLIN HFA) 108 (90 Base) MCG/ACT inhaler Inhale 2 puffs into the lungs every 6 hours as needed for shortness of breath / dyspnea or wheezing 1 Inhaler 1     aspirin (ASA) 81 MG EC tablet Take 1 tablet (81 mg) by mouth daily       cholecalciferol (VITAMIN D3) 5000 units (125 mcg) capsule Take 5,000 Units by mouth once a week       fluticasone (FLONASE) 50 MCG/ACT spray Spray 1 spray into both nostrils daily as needed        levothyroxine (SYNTHROID/LEVOTHROID) 75 MCG tablet Take 1 tablet (75 mcg) by mouth daily 90 tablet 3     multivitamin, therapeutic with minerals (MULTI-VITAMIN) TABS tablet Take 1 tablet by mouth daily 30 each 11     rosuvastatin (CRESTOR) 10 MG tablet Take 1 tablet (10 mg) by mouth daily 90 tablet 3           Past Medical History:     Past Medical History:   Diagnosis Date     Aortic root dilatation (H)      Asthma      BCC (basal cell carcinoma of skin) 2010    rt shoulder     Chronic hoarseness     congential vocal chord closure issue     Dysphagia     esophageal dilatation per [facility] 2/2019     Elevated coronary artery calcium score     CTa calcium score total 93.5 / 73rd percentile     Endometrial ca (H)     stage 1     Family hx of colon cancer      GERD (gastroesophageal reflux disease)      Hormone replacement therapy      Hyperlipidemia LDL goal <130 11/4/2016     Hypothyroidism      Nonrheumatic aortic valve insufficiency 11/4/2016     Nutcracker esophagus      Schatzki's ring of distal esophagus      Shoulder arthritis     rt     Thoracic aortic aneurysm without rupture (H)     4.3 cm     Vitamin D deficiency 11/4/2016     Past Surgical History:   Procedure Laterality Date     BIOPSY       C VAG HYST,UTERUS >250 GMS,REM TUBE/OVARY  2000     COLONOSCOPY       COLONOSCOPY N/A 12/12/2016    Procedure: COLONOSCOPY;  Surgeon: Manjinder Vera MD;  Location:  GI     CV CORONARY ANGIOGRAM N/A 4/27/2020    Procedure: Coronary  Angiogram;  Surgeon: Donaldo Rosen MD;  Location:  HEART CARDIAC CATH LAB     CV LEFT HEART CATH N/A 4/27/2020    Procedure: Left Heart Cath;  Surgeon: Donaldo Rosen MD;  Location:  HEART CARDIAC CATH LAB     CV LEFT VENTRICULOGRAM N/A 4/27/2020    Procedure: Left Ventriculogram;  Surgeon: Donaldo Rosen MD;  Location:  HEART CARDIAC CATH LAB     TONSILLECTOMY       Family History   Problem Relation Age of Onset     Diabetes Father      Social History     Socioeconomic History     Marital status: Single     Spouse name: Not on file     Number of children: Not on file     Years of education: Not on file     Highest education level: Not on file   Occupational History     Not on file   Social Needs     Financial resource strain: Not on file     Food insecurity     Worry: Not on file     Inability: Not on file     Transportation needs     Medical: Not on file     Non-medical: Not on file   Tobacco Use     Smoking status: Never Smoker     Smokeless tobacco: Never Used   Substance and Sexual Activity     Alcohol use: Yes     Comment: 1 glass of wine per week     Drug use: No     Sexual activity: Not Currently     Partners: Male     Birth control/protection: Post-menopausal   Lifestyle     Physical activity     Days per week: Not on file     Minutes per session: Not on file     Stress: Not on file   Relationships     Social connections     Talks on phone: Not on file     Gets together: Not on file     Attends Spiritism service: Not on file     Active member of club or organization: Not on file     Attends meetings of clubs or organizations: Not on file     Relationship status: Not on file     Intimate partner violence     Fear of current or ex partner: Not on file     Emotionally abused: Not on file     Physically abused: Not on file     Forced sexual activity: Not on file   Other Topics Concern     Parent/sibling w/ CABG, MI or angioplasty before 65F 55M? No   Social History Narrative    Single,  2 children           Allergies:   Dust mites, Mold, No known drug allergies, Seasonal allergies, and Adhesive tape      Charisse Bustos PA-C  [facility]  Pager: [phone]    A total of 45 minutes was spent with the patient, on chart review and documentation.

## 2021-05-19 NOTE — LETTER
5/19/2021    Caprice Lares MD  [address]  [facility] [address]    RE: Renea Lerma       Dear Colleague,    I had the pleasure of seeing Renea Lerma in the [facility].  Cardiology Clinic Progress Note    Renea Lerma MRN# [id number]   YOB: 1950 Age: 71 year old   Primary cardiologist: Dr. Casey         Assessment and Plan:     In summary, Renea Lerma presents today for follow up on mild, non-obstructive CAD. Stress echocardiogram last week showed excellent exercise capacity and no evidence of ischemia. She is on adequate medical therapy including aspirin and statin, with an LDL of 60 as of November. No changes are recommended from a cardiac standpoint.   From a pulmonary standpoint, she has concerns over a lingering cough and dyspnea since her clinical diagnosis of COVID-19 in January. CXR is benign. I have offered a CT scan however she is hesitant due to radiation exposure. She'll contact her PCP after our visit today for recommendations.     Follow-up:  - with Dr. Casey in 1 year.         History of Presenting Illness:      Renea Lerma is a pleasant 71 year old patient who presents today for follow up on recent testing.    The patient has a history of the following -   # Mild CAD per angiogram 4/2020  # Dilated ascending thoracic aorta  # Hyperlipidemia  # Paroxysmal SVT, asymptomatic  # Chronic chest pain    Renea presents today for an annual follow up visit. She's actually early for this, about six months out, because she's had some concerns after being clinically diagnosed with COVID-19 in January. Since that time, she's felt a little restricted in her breathing, with a nagging cough and just hasn't felt she can get as deep a breath as she'd like. Her chest has felt tight as well. Mentions that she's really disappointed in the care she received during COVID - states she expected to have some testing or be treated  "with some form of medication but was not.   She was supposed to have a stress echo later this year to follow up on her CAD, so we decided to perform that now. This was performed on 5/13, and results were excellent. Her exercise capacity was above average. She had a normal HR/BP response to exercise. No ischemic changes were seen with exercise, on EKG or echo. There was mild AI, MR and TR, stable.   Of note, her aorta was measured at 43.2 mm on CTA from Feb 2020.  She also had a CXR performed which showed \"possible slight hyperinflation\" otherwise unremarkable.  LDL in November was 60.     We reviewed her testing today, which she's glad to hear. However she still voices concerns about her lungs and wants to know where she should go from here. She returns to [place] next week for a few months. She denies orthopnea, edema, claudication, palpitations, near syncope or syncope. Her voice sounds a little hoarse but her lungs are clear on exam. BP is well-controlled in clinic. O2 sat is 99%.         Review of Systems:     12-pt ROS is negative except for as noted in the HPI.          Physical Exam:     Vitals: /70   Pulse 72   Ht 1.6 m (5' 3\")   Wt 60 kg (132 lb 4.8 oz)   SpO2 99%   BMI 23.44 kg/m    Wt Readings from Last 10 Encounters:   05/19/21 60 kg (132 lb 4.8 oz)   11/16/20 64 kg (141 lb)   11/16/20 64.3 kg (141 lb 12.8 oz)   08/19/20 65 kg (143 lb 4.8 oz)   04/27/20 64 kg (141 lb)   03/27/20 62.4 kg (137 lb 9.6 oz)   03/05/20 64.5 kg (142 lb 4.8 oz)   02/20/20 62.6 kg (138 lb)   11/11/19 63 kg (139 lb)   11/08/18 60.8 kg (134 lb)       Constitutional:  Patient is pleasant, alert, cooperative, and in NAD.  HEENT:  NCAT. PERRLA. EOM's intact.   Neck:  CVP appears normal. No carotid bruits.   Pulmonary: Normal respiratory effort. CTAB.   Cardiac: RRR, normal S1/S2, no S3/S4, no murmur or rub.   Abdomen:  Non-tender abdomen, no hepatosplenomegaly appreciated.   Vascular: Pulses in the upper and lower " extremities are 2+ and equal bilaterally.  Extremities: No edema, erythema, cyanosis or tenderness appreciated.  Skin:  No rashes or lesions appreciated.   Neurological:  No gross motor or sensory deficits.   Psych: Appropriate affect.        Data:     Labs reviewed:  Recent Labs   Lab Test 11/10/20  0904 08/19/20  1314 04/27/20  0705 02/20/20  0952 11/14/19  0854 11/08/18  0957 11/08/18  0957   LDL 60  --  51 140*  --    < > 121*   HDL 77  --  80 82  --    < > 77   NHDL 75  --  66 160*  --    < > 140*   CHOL 152  --  146 242*  --    < > 217*   TRIG 74  --  73 102  --    < > 93   TSH  --  0.59  --   --  1.25  --  1.13    < > = values in this interval not displayed.       Lab Results   Component Value Date    WBC 5.5 04/27/2020    RBC 3.79 (L) 04/27/2020    HGB 11.6 (L) 04/27/2020    HCT 33.8 (L) 04/27/2020    MCV 89 04/27/2020    MCH 30.6 04/27/2020    MCHC 34.3 04/27/2020    RDW 12.9 04/27/2020     04/27/2020       Lab Results   Component Value Date     11/10/2020    POTASSIUM 4.3 11/10/2020    CHLORIDE 109 11/10/2020    CO2 24 11/10/2020    ANIONGAP 6 11/10/2020    GLC 83 11/10/2020    BUN 17 11/10/2020    CR 0.74 11/10/2020    GFRESTIMATED 82 11/10/2020    GFRESTBLACK >90 11/10/2020    HAWK 9.2 11/10/2020      Lab Results   Component Value Date    AST 21 11/04/2019    ALT 28 11/10/2020       No results found for: A1C    Lab Results   Component Value Date    INR 1.05 04/27/2020           Problem List:     Patient Active Problem List   Diagnosis     Esophageal reflux     Mild intermittent asthma     Hypothyroidism     Fibrocystic breast disease, unspecified laterality     Nonrheumatic aortic valve insufficiency     Thoracic aortic aneurysm without rupture (H)     Mixed hyperlipidemia     Shoulder arthritis     Vitamin D deficiency     Lung nodule     Nutcracker esophagus     Schatzki's ring of distal esophagus     Aortic root dilatation (H)     Chronic hoarseness     Dysphagia     Coronary artery  disease involving native heart without angina pectoris, unspecified vessel or lesion type     Elevated coronary artery calcium score     Chest pain     History of endometrial cancer           Medications:     Current Outpatient Medications   Medication Sig Dispense Refill     albuterol (PROAIR HFA/PROVENTIL HFA/VENTOLIN HFA) 108 (90 Base) MCG/ACT inhaler Inhale 2 puffs into the lungs every 6 hours as needed for shortness of breath / dyspnea or wheezing 1 Inhaler 1     aspirin (ASA) 81 MG EC tablet Take 1 tablet (81 mg) by mouth daily       cholecalciferol (VITAMIN D3) 5000 units (125 mcg) capsule Take 5,000 Units by mouth once a week       fluticasone (FLONASE) 50 MCG/ACT spray Spray 1 spray into both nostrils daily as needed        levothyroxine (SYNTHROID/LEVOTHROID) 75 MCG tablet Take 1 tablet (75 mcg) by mouth daily 90 tablet 3     multivitamin, therapeutic with minerals (MULTI-VITAMIN) TABS tablet Take 1 tablet by mouth daily 30 each 11     rosuvastatin (CRESTOR) 10 MG tablet Take 1 tablet (10 mg) by mouth daily 90 tablet 3           Past Medical History:     Past Medical History:   Diagnosis Date     Aortic root dilatation (H)      Asthma      BCC (basal cell carcinoma of skin) 2010    rt shoulder     Chronic hoarseness     congential vocal chord closure issue     Dysphagia     esophageal dilatation per [facility] 2/2019     Elevated coronary artery calcium score     CTa calcium score total 93.5 / 73rd percentile     Endometrial ca (H)     stage 1     Family hx of colon cancer      GERD (gastroesophageal reflux disease)      Hormone replacement therapy      Hyperlipidemia LDL goal <130 11/4/2016     Hypothyroidism      Nonrheumatic aortic valve insufficiency 11/4/2016     Nutcracker esophagus      Schatzki's ring of distal esophagus      Shoulder arthritis     rt     Thoracic aortic aneurysm without rupture (H)     4.3 cm     Vitamin D deficiency 11/4/2016     Past Surgical History:   Procedure Laterality Date      BIOPSY       C VAG HYST,UTERUS >250 GMS,REM TUBE/OVARY  2000     COLONOSCOPY       COLONOSCOPY N/A 12/12/2016    Procedure: COLONOSCOPY;  Surgeon: Manjinder Vera MD;  Location:  GI     CV CORONARY ANGIOGRAM N/A 4/27/2020    Procedure: Coronary Angiogram;  Surgeon: Donaldo Rosen MD;  Location:  HEART CARDIAC CATH LAB     CV LEFT HEART CATH N/A 4/27/2020    Procedure: Left Heart Cath;  Surgeon: Donaldo Rosen MD;  Location:  HEART CARDIAC CATH LAB     CV LEFT VENTRICULOGRAM N/A 4/27/2020    Procedure: Left Ventriculogram;  Surgeon: Donaldo Rosen MD;  Location:  HEART CARDIAC CATH LAB     TONSILLECTOMY       Family History   Problem Relation Age of Onset     Diabetes Father      Social History     Socioeconomic History     Marital status: Single     Spouse name: Not on file     Number of children: Not on file     Years of education: Not on file     Highest education level: Not on file   Occupational History     Not on file   Social Needs     Financial resource strain: Not on file     Food insecurity     Worry: Not on file     Inability: Not on file     Transportation needs     Medical: Not on file     Non-medical: Not on file   Tobacco Use     Smoking status: Never Smoker     Smokeless tobacco: Never Used   Substance and Sexual Activity     Alcohol use: Yes     Comment: 1 glass of wine per week     Drug use: No     Sexual activity: Not Currently     Partners: Male     Birth control/protection: Post-menopausal   Lifestyle     Physical activity     Days per week: Not on file     Minutes per session: Not on file     Stress: Not on file   Relationships     Social connections     Talks on phone: Not on file     Gets together: Not on file     Attends Anabaptist service: Not on file     Active member of club or organization: Not on file     Attends meetings of clubs or organizations: Not on file     Relationship status: Not on file     Intimate partner violence     Fear of current or ex partner:  Not on file     Emotionally abused: Not on file     Physically abused: Not on file     Forced sexual activity: Not on file   Other Topics Concern     Parent/sibling w/ CABG, MI or angioplasty before 65F 55M? No   Social History Narrative    Single, 2 children           Allergies:   Dust mites, Mold, No known drug allergies, Seasonal allergies, and Adhesive tape      Chraisse Bustos PA-C  [facility]  Pager: [phone]    A total of 45 minutes was spent with the patient, on chart review and documentation.    cc:   Kathia Casey MD  [address]  [place] [address]

## 2021-05-19 NOTE — PATIENT INSTRUCTIONS
Today's Plan:   - Renea, your stress echo looked excellent. The chest x-ray was unrevealing. I have ordered a CT scan of the chest if you would like to have that before you return to [place]. Otherwise, establish with a PCP and /or a pulmonologist in [place] and see what they say.  - Continue your aspirin and statin therapy. Mediterranean-style diet and routine exercise.  - Return to see Dr. Casey in 1 year.     If you have questions or concerns please call my nurse team at [phone].  Scheduling phone number: [phone]  For after hours urgent concerns call [phone] option 2.   Reminder: Please bring in all current medications, over the counter supplements and vitamin bottles to your next appointment.    It was a pleasure seeing you today!     Charisse Bustos PA-C

## 2021-06-21 ENCOUNTER — TELEPHONE (OUTPATIENT)
Dept: CARDIOLOGY | Facility: CLINIC | Age: 71
End: 2021-06-21

## 2021-06-21 NOTE — TELEPHONE ENCOUNTER
----- Message from Isela Pritchard sent at 6/21/2021  3:07 PM CDT -----  Regarding: Appt question  Hello,    This patient moved to [place] and really wants to keep Dr Casey as her Cardiologist, she wants to know if there is anyway Dr Casey would see her in between 7/30-8/13. That is when she will be in town and right now his first available is 9/1.    Thank you      Isela

## 2021-06-21 NOTE — TELEPHONE ENCOUNTER
Spoke with patient regarding requested Dr. Casey visit.  Dr. Casey out of the office for 1 week and then not in clinic 2nd week.  Patient lives full time in [place] and will establish with Primary cardiologist  there.     Patient agrees with plan.

## 2021-06-29 ENCOUNTER — CARE COORDINATION (OUTPATIENT)
Dept: CARDIOLOGY | Facility: CLINIC | Age: 71
End: 2021-06-29

## 2021-06-29 ENCOUNTER — NURSE TRIAGE (OUTPATIENT)
Dept: FAMILY MEDICINE | Facility: CLINIC | Age: 71
End: 2021-06-29

## 2021-06-29 NOTE — TELEPHONE ENCOUNTER
"PCP: patient reporting chronic chest pain, bilateral leg swelling, more in left than right \"notibly worse\". Patient also states that she has bilateral leg pain (associates with Crestor) but left pain is worse than right. 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 Patient also states they are going to fly to [place] tomorrow. Writer advised that patient go to ED/UC to r/o blood clot. Patient states \"its been in my mind too\" that it could be a blood clot. Patient resistive to going to UC/ED because of cost of going to ER. Patient reports they do not take Asprin consistently. Please advise. Patient is currently residing in [place].        Patient called stating they have been on statin over a year  Started with new prescription (in may), states they have been experiencing horrible pain (excruitating, keeps me up at night) in legs (calves are worse, left leg worse) and arms (upper muscles).Patient states they decided not to take pill last night. Patient states pain has improved but still experiening pin in left leg and right arm.     Patient reports swelling in legs, trying to drink a lot of water. Bilateral leg swelling. Left leg (down by ankle/foot),       Patient takes Asprin 2-3 days a week  \"I have chest pain every day since covid\", got pneumonia on top of covid\". Sores, just hurts, chest pain (chronic), \"hurts all the time\"      Just retired 2 weeks ago but was sitting at desk all day.   \"swelling is better since Im not working\".     Patient states they \"Refuse to take that medication ever again\" Patient spoke with pharmacist, advised patient try taking  10mg or 5mg or switch to different brand.       Advised patient to go ER/UC. Patient states they are \"getting on plane to go to [place]\". Writer reiterated the importance to go to ER/UC. Patient will follow up with [facility], \"will start with that\" because it is the \"most financial\".       Patient has not gotten " vaccine, recommendation which one should get?     [phone]- okay to leave detailed vm.     Sandra Francisco RN  [facility]    Reason for Disposition    Chest pain    Additional Information    Negative: Followed a hip injury    Negative: Followed a knee injury    Negative: Followed an ankle or foot injury    Negative: Back pain radiating (shooting) into leg(s)    Negative: Foot pain is the main symptom    Negative: Ankle pain is the main symptom    Negative: Knee pain is the main symptom    Negative: Leg swelling is the main symptom    Negative: Looks like a broken bone or dislocated joint (e.g., crooked or deformed)    Negative: Sounds like a life-threatening emergency to the triager    Negative: Difficulty breathing    Negative: Entire foot is cool or blue in comparison to other side    Negative: Unable to walk    Protocols used: LEG PAIN-A-OH

## 2021-06-29 NOTE — TELEPHONE ENCOUNTER
"Discussed with patient     States she is having chest pain ongoing since she had pneumonia and COVID19, states she still has some pneumonia and states that is not anything new, is really muscular pain per patient and she plans to be seen on Thursday     States the \"real reason\" she called was that her pain started after she picked up the new Rx from Rosuvastatin. Pharmacist advised pt try holding off 1 night to see if pain improves then call PCP. Pt states she feels much better and \"I am not going to take this dose anymore\"     Asking if PCP would recommend cutting down to Rosuvastatin 5mg? If yes would like a new Rx to pended [facility] pharmacy     Jessica JULIO RN        "

## 2021-06-29 NOTE — PROGRESS NOTES
Received call from pt stating that she has been having muscle aches and decided to skip her dose of Crestor last night to see if any improvement and has noticed a big improvement with her mucscle aches so far today.    Reviewed with pt that her Crestor is prescribed by her PCP, Dr. Lares, and recommended she call Dr. Lares's office to get her opinion on cholesterol management going forward. Reviewed with pt that it is OK to continue holding Crestor until she hears back from Dr. Lares- she verbalized understanding and agrees with this plan.     Pt also inquired about what COVID-19 vaccine she should get. Reviewed with pt that the CDC website states all of the vaccines available are safe and effective and does not recommend one over another; however, in general we have been advising pt's to get either the Moderna or Pfizer vaccines because of the documented rare risk of developing a blood clot with the J&J vaccine. Advised pt to also aske for Dr. Lares's opinion on this subject- she verbalized understanding and agrees with this plan as well.    NATALIA Canicno 11:57 AM 6/29/2021

## 2021-06-30 NOTE — TELEPHONE ENCOUNTER
Caprice Lares MD  [facility] Triage 17 hours ago (9:22 PM)     She can actually hold crestor until she returns from [place] and then see me either in person or virtually    Message text      Detailed message left notifying pt of PCP's response and asking her to call back if any questions    Jessica JULIO RN     n/a

## 2021-07-09 ENCOUNTER — TELEPHONE (OUTPATIENT)
Dept: FAMILY MEDICINE | Facility: CLINIC | Age: 71
End: 2021-07-09

## 2021-07-09 DIAGNOSIS — E78.5 HYPERLIPIDEMIA LDL GOAL <100: Primary | ICD-10-CM

## 2021-07-09 DIAGNOSIS — K22.4 NUTCRACKER ESOPHAGUS: ICD-10-CM

## 2021-07-09 NOTE — TELEPHONE ENCOUNTER
"Patient called back patient currently living in [place], is it apporopaite to do virtual/phone visit? Patient will be in [place] July 22-August 16th, wants to schedule in-person physical if provider available.     Patient reports leg pain \"so much better\"  Would be fine with going to 5mg instead of 10 mg, can send script to pharmacy in [place].      [phone]- okay to leave detailed vm.     Sandra Francisco RN  [facility]    "

## 2021-07-14 NOTE — TELEPHONE ENCOUNTER
Spoke with pt and scheduled Px for 7/30. She stated there may be an issue with getting a car rental so 7/30 may not work but she will call back if unable to make appt.

## 2021-07-26 NOTE — TELEPHONE ENCOUNTER
"PCP please advise any other time for patient to be seen, canceled 7/30/21 appt, wanted to do labs before speaking with PCP. Patient in [place] 7/30-7/16. Patient requesting in-person visit, no same day appts available.     Patient in [place] on 7/30/21 , leaving August 16th at 10 am,  Had appt on 7/30/21 at 0700.but patient canceled appt.     \"if I cant see her physically, I will get my records and try to find a doctor down there\"    Declining virtual appt.     Sandra Francisco RN  [facility]    "

## 2021-07-26 NOTE — TELEPHONE ENCOUNTER
Pt called and cancelled physical for 7/30 at 7 am     does not come in to [place] until late Thursday night this week and wants labs done and back before she sees PCP     She wants to reschedule appointment with PCP so she has lab results available prior to appointment     Has been off rosuvastatin for about a month, pains have improved off this medication     Is only in [place] until August 16th - asking if PCP can do labs first then work her in sometime before 8/16?     Please advise     Jessica JULIO RN

## 2021-08-02 ENCOUNTER — LAB (OUTPATIENT)
Dept: LAB | Facility: CLINIC | Age: 71
End: 2021-08-02
Payer: MEDICARE

## 2021-08-02 DIAGNOSIS — K22.4 NUTCRACKER ESOPHAGUS: ICD-10-CM

## 2021-08-02 DIAGNOSIS — E78.5 HYPERLIPIDEMIA LDL GOAL <100: ICD-10-CM

## 2021-08-02 LAB
ANION GAP SERPL CALCULATED.3IONS-SCNC: 2 MMOL/L (ref 3–14)
BUN SERPL-MCNC: 20 MG/DL (ref 7–30)
CALCIUM SERPL-MCNC: 9.3 MG/DL (ref 8.5–10.1)
CHLORIDE BLD-SCNC: 108 MMOL/L (ref 94–109)
CHOLEST SERPL-MCNC: 278 MG/DL
CO2 SERPL-SCNC: 29 MMOL/L (ref 20–32)
CREAT SERPL-MCNC: 0.77 MG/DL (ref 0.52–1.04)
ERYTHROCYTE [DISTWIDTH] IN BLOOD BY AUTOMATED COUNT: 13.9 % (ref 10–15)
FASTING STATUS PATIENT QL REPORTED: YES
GFR SERPL CREATININE-BSD FRML MDRD: 78 ML/MIN/1.73M2
GLUCOSE BLD-MCNC: 86 MG/DL (ref 70–99)
HCT VFR BLD AUTO: 37 % (ref 35–47)
HDLC SERPL-MCNC: 101 MG/DL
HGB BLD-MCNC: 13 G/DL (ref 11.7–15.7)
LDLC SERPL CALC-MCNC: 158 MG/DL
MCH RBC QN AUTO: 31.7 PG (ref 26.5–33)
MCHC RBC AUTO-ENTMCNC: 35.1 G/DL (ref 31.5–36.5)
MCV RBC AUTO: 90 FL (ref 78–100)
NONHDLC SERPL-MCNC: 177 MG/DL
PLATELET # BLD AUTO: 243 10E3/UL (ref 150–450)
POTASSIUM BLD-SCNC: 4.7 MMOL/L (ref 3.4–5.3)
RBC # BLD AUTO: 4.1 10E6/UL (ref 3.8–5.2)
SODIUM SERPL-SCNC: 139 MMOL/L (ref 133–144)
TRIGL SERPL-MCNC: 94 MG/DL
TSH SERPL DL<=0.005 MIU/L-ACNC: 1.45 MU/L (ref 0.4–4)
WBC # BLD AUTO: 5.6 10E3/UL (ref 4–11)

## 2021-08-02 PROCEDURE — 80061 LIPID PANEL: CPT

## 2021-08-02 PROCEDURE — 36415 COLL VENOUS BLD VENIPUNCTURE: CPT

## 2021-08-02 PROCEDURE — 85027 COMPLETE CBC AUTOMATED: CPT

## 2021-08-02 PROCEDURE — 84443 ASSAY THYROID STIM HORMONE: CPT

## 2021-08-02 PROCEDURE — 80048 BASIC METABOLIC PNL TOTAL CA: CPT

## 2021-08-02 NOTE — PROGRESS NOTES
"SUBJECTIVE:   Renea Lerma is a 71 year old female who presents for Preventive Visit.    {Split Bill scripting  The purpose of this visit is to discuss your medical history and prevent health problems before you are sick. You may be responsible for a co-pay, coinsurance, or deductible if your visit today includes services such as checking on a sore throat, having an x-ray or lab test, or treating and evaluating a new or existing condition :375245}  Patient has been advised of split billing requirements and indicates understanding: {Yes and No:464085}   Are you in the first 12 months of your Medicare coverage?  { :290553::\"No\"}    HPI  Do you feel safe in your environment? { :730668}    Have you ever done Advance Care Planning? (For example, a Health Directive, POLST, or a discussion with a medical provider or your loved ones about your wishes): { :642315}    {Hearing Test Done (Optional):301040}   Fall risk  { :930590}  {If any of the above assessments are answered yes, consider ordering appropriate referrals (Optional):853419::\"click delete button to remove this line now\"}  Cognitive Screening { :657152}    {Do you have sleep apnea, excessive snoring or daytime drowsiness? (Optional):566384}    Reviewed and updated as needed this visit by clinical staff                 Reviewed and updated as needed this visit by Provider                Social History     Tobacco Use     Smoking status: Never Smoker     Smokeless tobacco: Never Used   Substance Use Topics     Alcohol use: Yes     Comment: 1 glass of wine per week     {Rooming Staff- Complete this question if Prescreen response is not shown below for today's visit. If you drink alcohol do you typically have >3 drinks per day or >7 drinks per week? (Optional):851069}    Alcohol Use 11/16/2020   Prescreen: >3 drinks/day or >7 drinks/week? -   Prescreen: >3 drinks/day or >7 drinks/week? No   {add AUDIT responses (Optional) (A score of 7 for adult men is an " "indication of hazardous drinking; a score of 8 or more is an indication of an alcohol use disorder.  A score of 7 or more for adult women is an indication of hazardous drinking or an alchohol use disorder):735514}    {Outside tests to abstract? :560917}    {additional problems to add (Optional):549951}    Current providers sharing in care for this patient include: {Rooming staff:  Please update Care Team in Rooming Activity, refresh this note and then delete this statement}  Patient Care Team:  Caprice Lares MD as PCP - General (Internal Medicine)  Caprice Lares MD as Assigned PCP  Chiquis Panda MD as MD (Dermatology)  Zayda Hussein MD as MD (Dermatology)  Raj Shirley MD as MD (Gastroenterology)  CEDRIC Seals MD as MD (Dermatology)  Kathia Casey MD as Assigned Heart and Vascular Provider  Zayda Hussein MD as Assigned Surgical Provider    The following health maintenance items are reviewed in Epic and correct as of today:  Health Maintenance Due   Topic Date Due     ANNUAL REVIEW OF HM ORDERS  Never done     ASTHMA ACTION PLAN  Never done     COVID-19 Vaccine (1) Never done     ZOSTER IMMUNIZATION (2 of 3) 12/30/2016     DTAP/TDAP/TD IMMUNIZATION (2 - Td or Tdap) 03/22/2020     Pneumococcal Vaccine: 65+ Years (2 of 2 - PPSV23) 10/01/2020     ASTHMA CONTROL TEST  11/19/2020     PHQ-2  01/01/2021     {Chronicprobdata (optional):284257}  {Decision Support (Optional):451328}        Review of Systems  {ROS COMP (Optional):686686}    OBJECTIVE:   There were no vitals taken for this visit. Estimated body mass index is 23.44 kg/m  as calculated from the following:    Height as of 5/19/21: 1.6 m (5' 3\").    Weight as of 5/19/21: 60 kg (132 lb 4.8 oz).  Physical Exam  {Exam (Optional) :006393}    {Diagnostic Test Results (Optional):217614::\"Diagnostic Test Results:\",\"Labs reviewed in Epic\"}    ASSESSMENT / PLAN:   {Diag Picklist:999938}    Patient has been advised of split " "billing requirements and indicates understanding: {YES / NO:267412::\"Yes\"}  COUNSELING:  {Medicare Counselin}    Estimated body mass index is 23.44 kg/m  as calculated from the following:    Height as of 21: 1.6 m (5' 3\").    Weight as of 21: 60 kg (132 lb 4.8 oz).    {Weight Management Plan (ACO) Complete if BMI is abnormal-  Ages 18-64  BMI >24.9.  Age 65+ with BMI <23 or >30 (Optional):708688}    She reports that she has never smoked. She has never used smokeless tobacco.      Appropriate preventive services were discussed with this patient, including applicable screening as appropriate for cardiovascular disease, diabetes, osteopenia/osteoporosis, and glaucoma.  As appropriate for age/gender, discussed screening for colorectal cancer, prostate cancer, breast cancer, and cervical cancer. Checklist reviewing preventive services available has been given to the patient.    Reviewed patients plan of care and provided an AVS. The {CarePlan:747418} for Renea meets the Care Plan requirement. This Care Plan has been established and reviewed with the {PATIENT, FAMILY MEMBER, CAREGIVER:300933}.    Counseling Resources:  ATP IV Guidelines  Pooled Cohorts Equation Calculator  Breast Cancer Risk Calculator  Breast Cancer: Medication to Reduce Risk  FRAX Risk Assessment  ICSI Preventive Guidelines  Dietary Guidelines for Americans,   USDA's MyPlate  ASA Prophylaxis  Lung CA Screening    Caprice Lares MD  [facility]    Identified Health Risks:  "

## 2021-08-03 ENCOUNTER — OFFICE VISIT (OUTPATIENT)
Dept: FAMILY MEDICINE | Facility: CLINIC | Age: 71
End: 2021-08-03
Payer: MEDICARE

## 2021-08-03 VITALS
SYSTOLIC BLOOD PRESSURE: 99 MMHG | BODY MASS INDEX: 22.88 KG/M2 | HEIGHT: 64 IN | TEMPERATURE: 96.9 F | DIASTOLIC BLOOD PRESSURE: 60 MMHG | WEIGHT: 134 LBS | HEART RATE: 69 BPM | OXYGEN SATURATION: 100 %

## 2021-08-03 DIAGNOSIS — J45.20 MILD INTERMITTENT ASTHMA WITHOUT COMPLICATION: ICD-10-CM

## 2021-08-03 DIAGNOSIS — I25.10 CORONARY ARTERY DISEASE INVOLVING NATIVE HEART WITHOUT ANGINA PECTORIS, UNSPECIFIED VESSEL OR LESION TYPE: ICD-10-CM

## 2021-08-03 DIAGNOSIS — E78.2 MIXED HYPERLIPIDEMIA: ICD-10-CM

## 2021-08-03 DIAGNOSIS — R06.00 DYSPNEA, UNSPECIFIED TYPE: ICD-10-CM

## 2021-08-03 DIAGNOSIS — U07.1 CLINICAL DIAGNOSIS OF COVID-19: ICD-10-CM

## 2021-08-03 DIAGNOSIS — I47.10 PAROXYSMAL SUPRAVENTRICULAR TACHYCARDIA (H): ICD-10-CM

## 2021-08-03 DIAGNOSIS — N60.19 FIBROCYSTIC BREAST DISEASE, UNSPECIFIED LATERALITY: ICD-10-CM

## 2021-08-03 DIAGNOSIS — Z23 NEED FOR VACCINATION: ICD-10-CM

## 2021-08-03 DIAGNOSIS — R49.0 CHRONIC HOARSENESS: ICD-10-CM

## 2021-08-03 DIAGNOSIS — K22.2 SCHATZKI'S RING OF DISTAL ESOPHAGUS: ICD-10-CM

## 2021-08-03 DIAGNOSIS — E03.9 ACQUIRED HYPOTHYROIDISM: ICD-10-CM

## 2021-08-03 DIAGNOSIS — Z00.00 ENCOUNTER FOR ANNUAL WELLNESS VISIT (AWV) IN MEDICARE PATIENT: Primary | ICD-10-CM

## 2021-08-03 PROCEDURE — 99214 OFFICE O/P EST MOD 30 MIN: CPT | Mod: 25 | Performed by: INTERNAL MEDICINE

## 2021-08-03 PROCEDURE — G0402 INITIAL PREVENTIVE EXAM: HCPCS | Performed by: INTERNAL MEDICINE

## 2021-08-03 PROCEDURE — 90471 IMMUNIZATION ADMIN: CPT | Performed by: INTERNAL MEDICINE

## 2021-08-03 PROCEDURE — 90715 TDAP VACCINE 7 YRS/> IM: CPT | Performed by: INTERNAL MEDICINE

## 2021-08-03 RX ORDER — LEVOTHYROXINE SODIUM 75 UG/1
75 TABLET ORAL DAILY
Qty: 90 TABLET | Refills: 3 | Status: SHIPPED | OUTPATIENT
Start: 2021-08-03

## 2021-08-03 RX ORDER — PRAVASTATIN SODIUM 10 MG
10 TABLET ORAL DAILY
Qty: 90 TABLET | Refills: 3 | Status: SHIPPED | OUTPATIENT
Start: 2021-08-03 | End: 2022-08-01

## 2021-08-03 ASSESSMENT — MIFFLIN-ST. JEOR: SCORE: 1103.06

## 2021-08-03 ASSESSMENT — ACTIVITIES OF DAILY LIVING (ADL): CURRENT_FUNCTION: NO ASSISTANCE NEEDED

## 2021-08-03 NOTE — NURSING NOTE
Prior to immunization administration, verified patients identity using patient s name and date of birth. Please see Immunization Activity for additional information.     Screening Questionnaire for Adult Immunization    Are you sick today?   No   Do you have allergies to medications, food, a vaccine component or latex?   Yes   Have you ever had a serious reaction after receiving a vaccination?   No   Do you have a long-term health problem with heart, lung, kidney, or metabolic disease (e.g., diabetes), asthma, a blood disorder, no spleen, complement component deficiency, a cochlear implant, or a spinal fluid leak?  Are you on long-term aspirin therapy?   Yes   Do you have cancer, leukemia, HIV/AIDS, or any other immune system problem?   No   Do you have a parent, brother, or sister with an immune system problem?   No   In the past 3 months, have you taken medications that affect  your immune system, such as prednisone, other steroids, or anticancer drugs; drugs for the treatment of rheumatoid arthritis, Crohn s disease, or psoriasis; or have you had radiation treatments?   No   Have you had a seizure, or a brain or other nervous system problem?   No   During the past year, have you received a transfusion of blood or blood    products, or been given immune (gamma) globulin or antiviral drug?   No   For women: Are you pregnant or is there a chance you could become       pregnant during the next month?   No   Have you received any vaccinations in the past 4 weeks?   No     Immunization questionnaire was positive for at least one answer.  Notified .        Per orders of Dr. Lares, injection of Tdap given by Susanna Montenegro CMA. Patient instructed to remain in clinic for 15 minutes afterwards, and to report any adverse reaction to me immediately.       Screening performed by Susanna Montenegro CMA on 8/3/2021 at 7:45 AM.

## 2021-08-03 NOTE — PROGRESS NOTES
SUBJECTIVE:   Renea Lerma is a 71 year old female who presents for Preventive Visit.   patient is moving to [place]  She recently retired and is doing ok  But since her covid infection in 1/2021, she remains shortness of breath and has cough  Non productive  No wheezing   She has no chest pain   But feels pressure in chest all day  Some dysphagia   But no cough with food  Exercises when in [place], but currently vacationing in [place] and not exercising or eating right  Her labs reflect very high lipid profile but she mentions that she has been eating out a lot because of vacation and went off statins because of her left leg pain which is now improved    She continues to have symptoms post Covid and is not comfortable at all    No fever or chills or weight loss    Lab on 08/02/2021   Component Date Value Ref Range Status     WBC Count 08/02/2021 5.6  4.0 - 11.0 10e3/uL Final     RBC Count 08/02/2021 4.10  3.80 - 5.20 10e6/uL Final     Hemoglobin 08/02/2021 13.0  11.7 - 15.7 g/dL Final     Hematocrit 08/02/2021 37.0  35.0 - 47.0 % Final     MCV 08/02/2021 90  78 - 100 fL Final     MCH 08/02/2021 31.7  26.5 - 33.0 pg Final     MCHC 08/02/2021 35.1  31.5 - 36.5 g/dL Final     RDW 08/02/2021 13.9  10.0 - 15.0 % Final     Platelet Count 08/02/2021 243  150 - 450 10e3/uL Final     Sodium 08/02/2021 139  133 - 144 mmol/L Final     Potassium 08/02/2021 4.7  3.4 - 5.3 mmol/L Final     Chloride 08/02/2021 108  94 - 109 mmol/L Final     Carbon Dioxide (CO2) 08/02/2021 29  20 - 32 mmol/L Final     Anion Gap 08/02/2021 2* 3 - 14 mmol/L Final     Urea Nitrogen 08/02/2021 20  7 - 30 mg/dL Final     Creatinine 08/02/2021 0.77  0.52 - 1.04 mg/dL Final     Calcium 08/02/2021 9.3  8.5 - 10.1 mg/dL Final     Glucose 08/02/2021 86  70 - 99 mg/dL Final     GFR Estimate 08/02/2021 78  >60 mL/min/1.73m2 Final    As of July 11, 2021, eGFR is calculated by the CKD-EPI creatinine equation, without race adjustment. eGFR can be influenced by muscle  mass, exercise, and diet. The reported eGFR is an estimation only and is only applicable if the renal function is stable.     Cholesterol 08/02/2021 278* <200 mg/dL Final    Age 0-19 years  Desirable: <170 mg/dL  Borderline high:  170-199 mg/dl  High:            >199 mg/dl    Age 20 years and older  Desirable: <200 mg/dL     Triglycerides 08/02/2021 94  <150 mg/dL Final    0-9 years:  Normal:    Less than 75 mg/dL  Borderline high:  75-99 mg/dL  High:             Greater than or equal to 100 mg/dL    0-19 years:  Normal:    Less than 90 mg/dL  Borderline high:   mg/dL  High:             Greater than or equal to 130 mg/dL    20 years and older:  Normal:    Less than 150 mg/dL  Borderline high:  150-199 mg/dL  High:             200-499 mg/dL  Very high:   Greater than or equal to 500 mg/dL     Direct Measure HDL 08/02/2021 101  >=50 mg/dL Final    0-19 years:       Greater than or equal to 45 mg/dL   Low: Less than 40 mg/dL   Borderline low: 40-44 mg/dL     20 years and older:   Female: Greater than or equal to 50 mg/dL   Male:   Greater than or equal to 40 mg/dL          LDL Cholesterol Calculated 08/02/2021 158* <=100 mg/dL Final    Age 0-19 years:  Desirable: 0-110 mg/dL   Borderline high: 110-129 mg/dL   High: >= 130 mg/dL    Age 20 years and older:  Desirable: <100mg/dL  Above desirable: 100-129 mg/dL   Borderline high: 130-159 mg/dL   High: 160-189 mg/dL   Very high: >= 190 mg/dL     Non HDL Cholesterol 08/02/2021 177* <130 mg/dL Final    0-19 years:  Desirable:          Less than 120 mg/dL  Borderline high:   120-144 mg/dL  High:                   Greater than or equal to 145 mg/dL    20 years and older:  Desirable:          130 mg/dL  Above Desirable: 130-159 mg/dL  Borderline high:   160-189 mg/dL  High:               190-219 mg/dL  Very high:     Greater than or equal to 220 mg/dL     Patient Fasting > 8hrs? 08/02/2021 Yes   Final     TSH 08/02/2021 1.45  0.40 - 4.00 mU/L Final       Are you in the  "first 12 months of your Medicare coverage?  Yes,  Visual Acuity:  Right Eye: 20/25   Left Eye: 20/25  Both Eyes: 20/20 with glasses    Healthy Habits:     In general, how would you rate your overall health?  Good    Frequency of exercise:  4-5 days/week    Duration of exercise:  45-60 minutes    Do you usually eat at least 4 servings of fruit and vegetables a day, include whole grains    & fiber and avoid regularly eating high fat or \"junk\" foods?  Yes    Taking medications regularly:  Yes    Barriers to taking medications:  None    Medication side effects:  None    Ability to successfully perform activities of daily living:  No assistance needed    Home Safety:  No safety concerns identified    Hearing Impairment:  No hearing concerns    In the past 6 months, have you been bothered by leaking of urine?  No    In general, how would you rate your overall mental or emotional health?  Very good      PHQ-2 Total Score: 0    Additional concerns today:  Yes    Do you feel safe in your environment? Yes    Have you ever done Advance Care Planning? (For example, a Health Directive, POLST, or a discussion with a medical provider or your loved ones about your wishes): Yes, patient states has an Advance Care Planning document and will bring a copy to the clinic.       Fall risk  Fallen 2 or more times in the past year?: No  Any fall with injury in the past year?: No    Cognitive Screening   1) Repeat 3 items (Leader, Season, Table)    2) Clock draw: NORMAL  3) 3 item recall: Recalls 3 objects  Results: 3 items recalled: COGNITIVE IMPAIRMENT LESS LIKELY    Mini-CogTM Copyright STEPHANIE Hamlin. Licensed by the author for use in [facility]; reprinted with permission (malik@.[email or web address]). All rights reserved.      Do you have sleep apnea, excessive snoring or daytime drowsiness?: no    Reviewed and updated as needed this visit by clinical staff  Tobacco  Allergies  Meds  Problems             Reviewed and updated as needed this " visit by Provider     Problems            Social History     Tobacco Use     Smoking status: Never Smoker     Smokeless tobacco: Never Used   Substance Use Topics     Alcohol use: Yes     Comment: 1 glass of wine per week     If you drink alcohol do you typically have >3 drinks per day or >7 drinks per week? No        Current providers sharing in care for this patient include:   Patient Care Team:  Caprice Larse MD as PCP - General (Internal Medicine)  Caprice Lares MD as Assigned PCP  Chiquis Panda MD as MD (Dermatology)  Zayda Hussein MD as MD (Dermatology)  Raj Shirley MD as MD (Gastroenterology)  CEDRIC Seals MD as MD (Dermatology)  Kathia Casey MD as Assigned Heart and Vascular Provider  Zayda Hussein MD as Assigned Surgical Provider    The following health maintenance items are reviewed in Epic and correct as of today:  Health Maintenance Due   Topic Date Due     ANNUAL REVIEW OF HM ORDERS  Never done     ASTHMA ACTION PLAN  Never done     COVID-19 Vaccine (1) Never done     ZOSTER IMMUNIZATION (2 of 3) 12/30/2016     DTAP/TDAP/TD IMMUNIZATION (2 - Td or Tdap) 03/22/2020     Pneumococcal Vaccine: 65+ Years (2 of 2 - PPSV23) 10/01/2020     ASTHMA CONTROL TEST  11/19/2020     BP Readings from Last 3 Encounters:   08/03/21 99/60   05/19/21 118/70   11/16/20 138/71    Wt Readings from Last 3 Encounters:   08/03/21 60.8 kg (134 lb)   05/19/21 60 kg (132 lb 4.8 oz)   11/16/20 64 kg (141 lb)                  Patient Active Problem List   Diagnosis     Esophageal reflux     Mild intermittent asthma     Hypothyroidism     Fibrocystic breast disease, unspecified laterality     Nonrheumatic aortic valve insufficiency     Thoracic aortic aneurysm without rupture (H)     Mixed hyperlipidemia     Shoulder arthritis     Vitamin D deficiency     Lung nodule     Nutcracker esophagus     Schatzki's ring of distal esophagus     Aortic root dilatation (H)     Chronic  hoarseness     Dysphagia     Coronary artery disease involving native heart without angina pectoris, unspecified vessel or lesion type     Elevated coronary artery calcium score     Chest pain     History of endometrial cancer     Past Surgical History:   Procedure Laterality Date     BIOPSY       C VAG HYST,UTERUS >250 GMS,REM TUBE/OVARY  2000     COLONOSCOPY       COLONOSCOPY N/A 12/12/2016    Procedure: COLONOSCOPY;  Surgeon: Manjinder Vera MD;  Location:  GI     CV CORONARY ANGIOGRAM N/A 4/27/2020    Procedure: Coronary Angiogram;  Surgeon: Donaldo Rosen MD;  Location:  HEART CARDIAC CATH LAB     CV LEFT HEART CATH N/A 4/27/2020    Procedure: Left Heart Cath;  Surgeon: Donaldo Rosen MD;  Location:  HEART CARDIAC CATH LAB     CV LEFT VENTRICULOGRAM N/A 4/27/2020    Procedure: Left Ventriculogram;  Surgeon: Donaldo Rosen MD;  Location:  HEART CARDIAC CATH LAB     TONSILLECTOMY         Social History     Tobacco Use     Smoking status: Never Smoker     Smokeless tobacco: Never Used   Substance Use Topics     Alcohol use: Yes     Comment: 1 glass of wine per week     Family History   Problem Relation Age of Onset     Diabetes Father          Current Outpatient Medications   Medication Sig Dispense Refill     albuterol (PROAIR HFA/PROVENTIL HFA/VENTOLIN HFA) 108 (90 Base) MCG/ACT inhaler Inhale 2 puffs into the lungs every 6 hours as needed for shortness of breath / dyspnea or wheezing 1 Inhaler 1     aspirin (ASA) 81 MG EC tablet Take 1 tablet (81 mg) by mouth daily       cholecalciferol (VITAMIN D3) 5000 units (125 mcg) capsule Take 5,000 Units by mouth once a week       fluticasone (FLONASE) 50 MCG/ACT spray Spray 1 spray into both nostrils daily as needed        fluticasone-vilanterol (BREO ELLIPTA) 200-25 MCG/INH inhaler Inhale 1 puff into the lungs daily 1 each 1     levothyroxine (SYNTHROID/LEVOTHROID) 75 MCG tablet Take 1 tablet (75 mcg) by mouth daily 90 tablet 3      "multivitamin, therapeutic with minerals (MULTI-VITAMIN) TABS tablet Take 1 tablet by mouth daily 30 each 11     pravastatin (PRAVACHOL) 10 MG tablet Take 1 tablet (10 mg) by mouth daily 90 tablet 3             Review of Systems  10 point ROS of systems including Constitutional, Eyes, Respiratory, Cardiovascular, Gastroenterology, Genitourinary, Integumentary, Muscularskeletal, Psychiatric were all negative except for pertinent positives noted in my HPI.      OBJECTIVE:   BP 99/60   Pulse 69   Temp 96.9  F (36.1  C) (Temporal)   Ht 1.618 m (5' 3.7\")   Wt 60.8 kg (134 lb)   SpO2 100%   BMI 23.22 kg/m   Estimated body mass index is 23.22 kg/m  as calculated from the following:    Height as of this encounter: 1.618 m (5' 3.7\").    Weight as of this encounter: 60.8 kg (134 lb).  Physical Exam  GENERAL APPEARANCE: healthy, alert and no distress  EYES: Eyes grossly normal to inspection, PERRL and conjunctivae and sclerae normal  HENT: ear canals and TM's normal, nose and mouth without ulcers or lesions, oropharynx clear and oral mucous membranes moist  NECK: no adenopathy, no asymmetry, masses, or scars and thyroid normal to palpation  RESP: lungs clear to auscultation - no rales, rhonchi or wheezes  BREAST: Nipples inverted otherwise normal without masses, tenderness or nipple discharge and no palpable axillary masses or adenopathy  CV: regular rate and rhythm, normal S1 S2, no S3 or S4, no murmur, click or rub, no peripheral edema and peripheral pulses strong  ABDOMEN: soft, nontender, no hepatosplenomegaly, no masses and bowel sounds normal  MS: no musculoskeletal defects are noted and gait is age appropriate without ataxia  SKIN: no suspicious lesions or rashes  NEURO: Normal strength and tone, sensory exam grossly normal, mentation intact and speech normal  PSYCH: mentation appears normal and affect normal/bright        ASSESSMENT / PLAN:   Renea was seen today for physical.    Diagnoses and all orders for this " visit:    Encounter for annual wellness visit (AWV) in Medicare patient  Very pleasant 71 years old lady who will be now living in [place]  And doing well in regards to preventive screening up-to-date on colonoscopy and needs mammogram in October  Will go off hormone replacement therapy and will start to eat right  Does not need a Pap smear  She will take a Tdap today but declines Covid vaccination due to travel  Will take mrna when in [place]  Coronary artery disease involving native heart without angina pectoris, unspecified vessel or lesion type  Mild, no angina  Acquired hypothyroidism  -     levothyroxine (SYNTHROID/LEVOTHROID) 75 MCG tablet; Take 1 tablet (75 mcg) by mouth daily  TSH   Date Value Ref Range Status   08/02/2021 1.45 0.40 - 4.00 mU/L Final   08/19/2020 0.59 0.40 - 4.00 mU/L Final       Dyspnea, unspecified type  Needs workup and spirometry  Will try Breo  -     Adult Pulmonary Medicine Referral; Future  -     fluticasone-vilanterol (BREO ELLIPTA) 200-25 MCG/INH inhaler; Inhale 1 puff into the lungs daily  -     CT Chest w/o Contrast; Future    Mild intermittent asthma without complication  -     CT Chest w/o Contrast; Future  As above    Paroxysmal supraventricular tachycardia (H)  No new symptoms     Schatzki's ring of distal esophagus    May need dilation , but for now mechanical changes to food intake, slow swallowing etc.  Fibrocystic breast disease, unspecified laterality    Mammogram each year  Will do off HRT  Mixed hyperlipidemia  Will try pravastatin    -     pravastatin (PRAVACHOL) 10 MG tablet; Take 1 tablet (10 mg) by mouth daily  -     Lipid panel reflex to direct LDL Fasting; Future    Chronic hoarseness  stable  Clinical diagnosis of COVID-19  In January and has persistent cough and shortness of breath  We discussed various vaccines at length  -     CT Chest w/o Contrast; Future    Need for vaccination  -     TDAP VACCINE (Adacel, Boostrix)  [[id number]]          COUNSELING:  Reviewed  "preventive health counseling, as reflected in patient instructions       Immunizations    Vaccinated for: TDAP and Declined: Covid vaccination because of travel plans but will consider        Estimated body mass index is 23.22 kg/m  as calculated from the following:    Height as of this encounter: 1.618 m (5' 3.7\").    Weight as of this encounter: 60.8 kg (134 lb).        She reports that she has never smoked. She has never used smokeless tobacco.      Appropriate preventive services were discussed with this patient, including applicable screening as appropriate for cardiovascular disease, diabetes, osteopenia/osteoporosis, and glaucoma.  As appropriate for age/gender, discussed screening for colorectal cancer, , breast cancer, and cervical cancer. Checklist reviewing preventive services available has been given to the patient.    Reviewed patients plan of care and provided an AVS. The Complex Care Plan (for patients with higher acuity and needing more deliberate coordination of services) for Renea meets the Care Plan requirement. This Care Plan has been established and reviewed with the Patient.    Counseling Resources:  ATP IV Guidelines  Pooled Cohorts Equation Calculator  Breast Cancer Risk Calculator  Breast Cancer: Medication to Reduce Risk  FRAX Risk Assessment  ICSI Preventive Guidelines  Dietary Guidelines for Americans, 2010  [employer]'s MyPlate  ASA Prophylaxis  Lung CA Screening    Caprice Lares MD  [facility]    Identified Health Risks:  "

## 2021-08-04 ENCOUNTER — HOSPITAL ENCOUNTER (OUTPATIENT)
Dept: CT IMAGING | Facility: CLINIC | Age: 71
Discharge: HOME | End: 2021-08-04
Attending: INTERNAL MEDICINE | Admitting: INTERNAL MEDICINE
Payer: MEDICARE

## 2021-08-04 DIAGNOSIS — U07.1 CLINICAL DIAGNOSIS OF COVID-19: ICD-10-CM

## 2021-08-04 DIAGNOSIS — J45.20 MILD INTERMITTENT ASTHMA WITHOUT COMPLICATION: ICD-10-CM

## 2021-08-04 DIAGNOSIS — R06.00 DYSPNEA, UNSPECIFIED TYPE: ICD-10-CM

## 2021-08-04 PROCEDURE — 71250 CT THORAX DX C-: CPT | Mod: MG

## 2021-08-04 ASSESSMENT — ASTHMA QUESTIONNAIRES: ACT_TOTALSCORE: 23

## 2021-09-18 ENCOUNTER — HEALTH MAINTENANCE LETTER (OUTPATIENT)
Age: 71
End: 2021-09-18

## 2022-07-31 DIAGNOSIS — E78.2 MIXED HYPERLIPIDEMIA: ICD-10-CM

## 2022-08-01 RX ORDER — PRAVASTATIN SODIUM 10 MG
TABLET ORAL
Qty: 90 TABLET | Refills: 2 | Status: SHIPPED | OUTPATIENT
Start: 2022-08-01

## 2022-08-04 NOTE — PLAN OF CARE
Discharge Planner PT   Patient plan for discharge: Home  Current status: CR orders received, chart reviewed. Per chart plan in place for pt to return home to await results of COVID testing, plan for cardiology follow-up for possible angio in the future. Pt currently mobilizing IND. Provided CR education handouts (RN brought into room at disch) including cardiac risk factors, aerobic activity, symptoms of activity intolerance, OMNI scale, and energy conservation.  Barriers to return to prior living situation: None anticipated  Recommendations for discharge: Home  Rationale for recommendations: Pt mobilizing IND, plan to return home today to await results of COVID testing. IP CR eval not indicated at this time. Orders completed.       Entered by: Charo Saenz 03/27/2020 1:58 PM        Hydroquinone Counseling:  Patient advised that medication may result in skin irritation, lightening (hypopigmentation), dryness, and burning.  In the event of skin irritation, the patient was advised to reduce the amount of the drug applied or use it less frequently.  Rarely, spots that are treated with hydroquinone can become darker (pseudoochronosis).  Should this occur, patient instructed to stop medication and call the office. The patient verbalized understanding of the proper use and possible adverse effects of hydroquinone.  All of the patient's questions and concerns were addressed.

## 2022-10-07 ENCOUNTER — NEW PATIENT (OUTPATIENT)
Dept: URBAN - METROPOLITAN AREA CLINIC 53 | Facility: CLINIC | Age: 72
End: 2022-10-07

## 2022-10-07 DIAGNOSIS — H16.223: ICD-10-CM

## 2022-10-07 DIAGNOSIS — H25.813: ICD-10-CM

## 2022-10-07 DIAGNOSIS — H52.4: ICD-10-CM

## 2022-10-07 DIAGNOSIS — H40.013: ICD-10-CM

## 2022-10-07 PROCEDURE — 92015 DETERMINE REFRACTIVE STATE: CPT

## 2022-10-07 PROCEDURE — 92004 COMPRE OPH EXAM NEW PT 1/>: CPT

## 2022-10-07 ASSESSMENT — VISUAL ACUITY
OD_GLARE: 20/50
OU_CC: 20/30-1
OS_CC: 20/40
OS_GLARE: 20/40
OS_SC: 20/50
OS_GLARE: 20/70
OD_CC: 20/30+1
OU_SC: 20/40-1
OD_GLARE: 20/30
OS_PH: 20/30-2
OD_SC: 20/50
OU_CC: J1 @ 14"

## 2022-10-07 ASSESSMENT — KERATOMETRY
OD_AXISANGLE2_DEGREES: 45
OS_AXISANGLE_DEGREES: 178
OD_K1POWER_DIOPTERS: 41.75
OS_K2POWER_DIOPTERS: 43.00
OD_AXISANGLE_DEGREES: 135
OS_AXISANGLE2_DEGREES: 88
OD_K2POWER_DIOPTERS: 42.50
OS_K1POWER_DIOPTERS: 42.50

## 2022-10-07 ASSESSMENT — TONOMETRY
OD_IOP_MMHG: 17
OS_IOP_MMHG: 17

## 2022-11-19 ENCOUNTER — HEALTH MAINTENANCE LETTER (OUTPATIENT)
Age: 72
End: 2022-11-19

## 2023-04-09 ENCOUNTER — HEALTH MAINTENANCE LETTER (OUTPATIENT)
Age: 73
End: 2023-04-09

## 2024-01-28 ENCOUNTER — HEALTH MAINTENANCE LETTER (OUTPATIENT)
Age: 74
End: 2024-01-28

## (undated) DEVICE — KIT HAND CONTROL ANGIOTOUCH ACIST 65CM AT-P65

## (undated) DEVICE — CATH ANGIO INFINITI PIGTAIL 145 6 SH 6FRX110CM  534-652S

## (undated) DEVICE — INTRO GLIDESHEATH SLENDER 6FR 10X45CM 60-1060

## (undated) DEVICE — CATH ANGIO JUDKINS JL4 6FRX100CM INFINITI 534620T

## (undated) DEVICE — TOTE ANGIO CORP PC15AT SAN32CC83O

## (undated) DEVICE — CATH ANGIO JUDKINS R4 6FRX100CM INFINITI 534621T

## (undated) DEVICE — SYR ANGIOGRAPHY MULTIUSE KIT ACIST 014612

## (undated) DEVICE — SLEEVE TR BAND RADIAL COMPRESSION DEVICE 24CM TRB24-REG

## (undated) DEVICE — DEFIB PRO-PADZ LVP LQD GEL ADULT 8900-2105-01

## (undated) DEVICE — MANIFOLD KIT ANGIO AUTOMATED 014613

## (undated) RX ORDER — METOPROLOL TARTRATE 50 MG
TABLET ORAL
Status: DISPENSED
Start: 2020-02-27

## (undated) RX ORDER — POTASSIUM CHLORIDE 1500 MG/1
TABLET, EXTENDED RELEASE ORAL
Status: DISPENSED
Start: 2020-04-27

## (undated) RX ORDER — FENTANYL CITRATE 50 UG/ML
INJECTION, SOLUTION INTRAMUSCULAR; INTRAVENOUS
Status: DISPENSED
Start: 2020-04-27

## (undated) RX ORDER — LIDOCAINE HYDROCHLORIDE 10 MG/ML
INJECTION, SOLUTION EPIDURAL; INFILTRATION; INTRACAUDAL; PERINEURAL
Status: DISPENSED
Start: 2020-04-27

## (undated) RX ORDER — NITROGLYCERIN 5 MG/ML
VIAL (ML) INTRAVENOUS
Status: DISPENSED
Start: 2020-04-27

## (undated) RX ORDER — VERAPAMIL HYDROCHLORIDE 2.5 MG/ML
INJECTION, SOLUTION INTRAVENOUS
Status: DISPENSED
Start: 2020-04-27

## (undated) RX ORDER — NITROGLYCERIN 0.4 MG/1
TABLET SUBLINGUAL
Status: DISPENSED
Start: 2020-02-27

## (undated) RX ORDER — HEPARIN SODIUM 1000 [USP'U]/ML
INJECTION, SOLUTION INTRAVENOUS; SUBCUTANEOUS
Status: DISPENSED
Start: 2020-04-27